# Patient Record
Sex: FEMALE | Race: WHITE | NOT HISPANIC OR LATINO | Employment: OTHER | URBAN - METROPOLITAN AREA
[De-identification: names, ages, dates, MRNs, and addresses within clinical notes are randomized per-mention and may not be internally consistent; named-entity substitution may affect disease eponyms.]

---

## 2017-02-16 ENCOUNTER — HISTORICAL (OUTPATIENT)
Dept: INTERNAL MEDICINE | Facility: CLINIC | Age: 62
End: 2017-02-16

## 2017-03-20 ENCOUNTER — HISTORICAL (OUTPATIENT)
Dept: ORTHOPEDICS | Facility: CLINIC | Age: 62
End: 2017-03-20

## 2017-03-28 ENCOUNTER — HISTORICAL (OUTPATIENT)
Dept: INTERNAL MEDICINE | Facility: CLINIC | Age: 62
End: 2017-03-28

## 2017-04-25 ENCOUNTER — HISTORICAL (OUTPATIENT)
Dept: INTERNAL MEDICINE | Facility: CLINIC | Age: 62
End: 2017-04-25

## 2017-05-04 ENCOUNTER — HISTORICAL (OUTPATIENT)
Dept: ENDOSCOPY | Facility: HOSPITAL | Age: 62
End: 2017-05-04

## 2017-05-04 LAB — CRC RECOMMENDATION EXT: NORMAL

## 2017-08-08 ENCOUNTER — HISTORICAL (OUTPATIENT)
Dept: INTERNAL MEDICINE | Facility: CLINIC | Age: 62
End: 2017-08-08

## 2017-08-08 LAB
T4 FREE SERPL-MCNC: 1.45 NG/DL (ref 0.76–1.46)
TSH SERPL-ACNC: 0.21 MIU/L (ref 0.36–3.74)

## 2017-09-05 ENCOUNTER — HISTORICAL (OUTPATIENT)
Dept: INTERNAL MEDICINE | Facility: CLINIC | Age: 62
End: 2017-09-05

## 2017-09-05 LAB
EST. AVERAGE GLUCOSE BLD GHB EST-MCNC: 148 MG/DL
HBA1C MFR BLD: 6.8 % (ref 4.2–6.3)

## 2017-10-19 ENCOUNTER — HISTORICAL (OUTPATIENT)
Dept: RADIOLOGY | Facility: HOSPITAL | Age: 62
End: 2017-10-19

## 2018-03-02 ENCOUNTER — HISTORICAL (OUTPATIENT)
Dept: INTERNAL MEDICINE | Facility: CLINIC | Age: 63
End: 2018-03-02

## 2018-03-02 LAB
EST. AVERAGE GLUCOSE BLD GHB EST-MCNC: 146 MG/DL
HBA1C MFR BLD: 6.7 % (ref 4.2–6.3)

## 2018-04-03 ENCOUNTER — HISTORICAL (OUTPATIENT)
Dept: INTERNAL MEDICINE | Facility: CLINIC | Age: 63
End: 2018-04-03

## 2018-04-03 LAB
ABS NEUT (OLG): 3.36 X10(3)/MCL (ref 2.1–9.2)
ALBUMIN SERPL-MCNC: 3.6 GM/DL (ref 3.4–5)
ALBUMIN/GLOB SERPL: 1 RATIO (ref 1–2)
ALP SERPL-CCNC: 79 UNIT/L (ref 45–117)
ALT SERPL-CCNC: 28 UNIT/L (ref 12–78)
APPEARANCE, UA: CLEAR
AST SERPL-CCNC: 18 UNIT/L (ref 15–37)
BACTERIA #/AREA URNS AUTO: ABNORMAL /[HPF]
BASOPHILS # BLD AUTO: 0.02 X10(3)/MCL
BASOPHILS NFR BLD AUTO: 0 %
BILIRUB SERPL-MCNC: 0.5 MG/DL (ref 0.2–1)
BILIRUB UR QL STRIP: NEGATIVE
BILIRUBIN DIRECT+TOT PNL SERPL-MCNC: 0.1 MG/DL
BILIRUBIN DIRECT+TOT PNL SERPL-MCNC: 0.4 MG/DL
BUN SERPL-MCNC: 19 MG/DL (ref 7–18)
CALCIUM SERPL-MCNC: 8.7 MG/DL (ref 8.5–10.1)
CHLORIDE SERPL-SCNC: 109 MMOL/L (ref 98–107)
CHOLEST SERPL-MCNC: 165 MG/DL
CHOLEST/HDLC SERPL: 4.1 {RATIO} (ref 0–4.4)
CO2 SERPL-SCNC: 26 MMOL/L (ref 21–32)
COLOR UR: ABNORMAL
CREAT SERPL-MCNC: 0.6 MG/DL (ref 0.6–1.3)
CREAT UR-MCNC: 169 MG/DL
EOSINOPHIL # BLD AUTO: 0.19 10*3/UL
EOSINOPHIL NFR BLD AUTO: 3 %
ERYTHROCYTE [DISTWIDTH] IN BLOOD BY AUTOMATED COUNT: 12.8 % (ref 11.5–14.5)
EST. AVERAGE GLUCOSE BLD GHB EST-MCNC: 131 MG/DL
GLOBULIN SER-MCNC: 3.5 GM/ML (ref 2.3–3.5)
GLUCOSE (UA): NORMAL
GLUCOSE SERPL-MCNC: 123 MG/DL (ref 74–106)
HAV IGM SERPL QL IA: NONREACTIVE
HBA1C MFR BLD: 6.2 % (ref 4.2–6.3)
HBV CORE IGM SERPL QL IA: NONREACTIVE
HBV SURFACE AG SERPL QL IA: NEGATIVE
HCT VFR BLD AUTO: 41.7 % (ref 35–46)
HCV AB SERPL QL IA: NONREACTIVE
HDLC SERPL-MCNC: 40 MG/DL
HGB BLD-MCNC: 13.4 GM/DL (ref 12–16)
HGB UR QL STRIP: NEGATIVE
HIV 1+2 AB+HIV1 P24 AG SERPL QL IA: NONREACTIVE
HYALINE CASTS #/AREA URNS LPF: ABNORMAL /[LPF]
IMM GRANULOCYTES # BLD AUTO: 0.02 10*3/UL
IMM GRANULOCYTES NFR BLD AUTO: 0 %
KETONES UR QL STRIP: ABNORMAL
LDLC SERPL CALC-MCNC: 88 MG/DL (ref 0–130)
LEUKOCYTE ESTERASE UR QL STRIP: 500 LEU/UL
LYMPHOCYTES # BLD AUTO: 1.85 X10(3)/MCL
LYMPHOCYTES NFR BLD AUTO: 30 % (ref 13–40)
MCH RBC QN AUTO: 28.5 PG (ref 26–34)
MCHC RBC AUTO-ENTMCNC: 32.1 GM/DL (ref 31–37)
MCV RBC AUTO: 88.7 FL (ref 80–100)
MICROALBUMIN UR-MCNC: 26.7 MG/L (ref 0–19)
MICROALBUMIN/CREAT RATIO PNL UR: 15.8 MCG/MG CR (ref 0–29)
MONOCYTES # BLD AUTO: 0.63 X10(3)/MCL
MONOCYTES NFR BLD AUTO: 10 % (ref 4–12)
MUCOUS THREADS URNS QL MICRO: ABNORMAL
NEUTROPHILS # BLD AUTO: 3.36 X10(3)/MCL
NEUTROPHILS NFR BLD AUTO: 55 X10(3)/MCL
NITRITE UR QL STRIP: NEGATIVE
PH UR STRIP: 5.5 [PH] (ref 4.5–8)
PLATELET # BLD AUTO: 201 X10(3)/MCL (ref 130–400)
PMV BLD AUTO: 10.1 FL (ref 7.4–10.4)
POTASSIUM SERPL-SCNC: 4.4 MMOL/L (ref 3.5–5.1)
PROT SERPL-MCNC: 7.1 GM/DL (ref 6.4–8.2)
PROT UR QL STRIP: 20 MG/DL
RBC # BLD AUTO: 4.7 X10(6)/MCL (ref 4–5.2)
RBC #/AREA URNS AUTO: ABNORMAL /[HPF]
SODIUM SERPL-SCNC: 141 MMOL/L (ref 136–145)
SP GR UR STRIP: 1.02 (ref 1–1.03)
SQUAMOUS #/AREA URNS LPF: ABNORMAL /[LPF]
T4 FREE SERPL-MCNC: 1.23 NG/DL (ref 0.76–1.46)
TRIGL SERPL-MCNC: 186 MG/DL
TSH SERPL-ACNC: 0.14 MIU/L (ref 0.36–3.74)
UROBILINOGEN UR STRIP-ACNC: 2 MG/DL
VLDLC SERPL CALC-MCNC: 37 MG/DL
WBC # SPEC AUTO: 6.1 X10(3)/MCL (ref 4.5–11)
WBC #/AREA URNS AUTO: ABNORMAL /HPF

## 2018-05-07 ENCOUNTER — HISTORICAL (OUTPATIENT)
Dept: INTERNAL MEDICINE | Facility: CLINIC | Age: 63
End: 2018-05-07

## 2018-05-07 LAB
APPEARANCE, UA: CLEAR
BACTERIA #/AREA URNS AUTO: ABNORMAL /[HPF]
BILIRUB UR QL STRIP: NEGATIVE
COLOR UR: YELLOW
GLUCOSE (UA): NORMAL
HGB UR QL STRIP: NEGATIVE
HYALINE CASTS #/AREA URNS LPF: ABNORMAL /[LPF]
KETONES UR QL STRIP: NEGATIVE
LEUKOCYTE ESTERASE UR QL STRIP: 500 LEU/UL
MUCOUS THREADS URNS QL MICRO: SLIGHT
NITRITE UR QL STRIP: NEGATIVE
PH UR STRIP: 6 [PH] (ref 4.5–8)
PROT UR QL STRIP: NEGATIVE
RBC #/AREA URNS AUTO: ABNORMAL /[HPF]
SP GR UR STRIP: 1.02 (ref 1–1.03)
SQUAMOUS #/AREA URNS LPF: ABNORMAL /[LPF]
UROBILINOGEN UR STRIP-ACNC: NORMAL
WBC #/AREA URNS AUTO: ABNORMAL /HPF

## 2018-05-09 LAB — FINAL CULTURE: NORMAL

## 2018-05-25 ENCOUNTER — HISTORICAL (OUTPATIENT)
Dept: INTERNAL MEDICINE | Facility: CLINIC | Age: 63
End: 2018-05-25

## 2018-07-30 ENCOUNTER — HISTORICAL (OUTPATIENT)
Dept: INTERNAL MEDICINE | Facility: CLINIC | Age: 63
End: 2018-07-30

## 2018-07-30 LAB
APPEARANCE, UA: CLEAR
BACTERIA #/AREA URNS AUTO: ABNORMAL /[HPF]
BILIRUB UR QL STRIP: NEGATIVE
CHOLEST SERPL-MCNC: 191 MG/DL
CHOLEST/HDLC SERPL: 4.1 {RATIO} (ref 0–4.4)
COLOR UR: ABNORMAL
GLUCOSE (UA): NORMAL
HDLC SERPL-MCNC: 47 MG/DL
HGB UR QL STRIP: NEGATIVE
HYALINE CASTS #/AREA URNS LPF: ABNORMAL /[LPF]
KETONES UR QL STRIP: NEGATIVE
LDLC SERPL CALC-MCNC: 121 MG/DL (ref 0–130)
LEUKOCYTE ESTERASE UR QL STRIP: 500 LEU/UL
NITRITE UR QL STRIP: NEGATIVE
PH UR STRIP: 6 [PH] (ref 4.5–8)
PROT UR QL STRIP: NEGATIVE
RBC #/AREA URNS AUTO: ABNORMAL /[HPF]
SP GR UR STRIP: 1.02 (ref 1–1.03)
SQUAMOUS #/AREA URNS LPF: ABNORMAL /[LPF]
T4 FREE SERPL-MCNC: 1.35 NG/DL (ref 0.76–1.46)
TRIGL SERPL-MCNC: 113 MG/DL
TSH SERPL-ACNC: 0.33 MIU/L (ref 0.36–3.74)
UROBILINOGEN UR STRIP-ACNC: NORMAL
VLDLC SERPL CALC-MCNC: 23 MG/DL
WBC #/AREA URNS AUTO: ABNORMAL /HPF

## 2018-08-01 LAB — FINAL CULTURE: NORMAL

## 2018-10-22 ENCOUNTER — HISTORICAL (OUTPATIENT)
Dept: ADMINISTRATIVE | Facility: HOSPITAL | Age: 63
End: 2018-10-22

## 2018-10-22 ENCOUNTER — HISTORICAL (OUTPATIENT)
Dept: RADIOLOGY | Facility: HOSPITAL | Age: 63
End: 2018-10-22

## 2018-10-22 LAB
APPEARANCE, UA: CLEAR
BACTERIA #/AREA URNS AUTO: ABNORMAL /[HPF]
BILIRUB UR QL STRIP: NEGATIVE
COLOR UR: YELLOW
EST. AVERAGE GLUCOSE BLD GHB EST-MCNC: 146 MG/DL
GLUCOSE (UA): NORMAL
HBA1C MFR BLD: 6.7 % (ref 4.2–6.3)
HGB UR QL STRIP: NEGATIVE
HYALINE CASTS #/AREA URNS LPF: ABNORMAL /[LPF]
KETONES UR QL STRIP: NEGATIVE
LEUKOCYTE ESTERASE UR QL STRIP: 250 LEU/UL
NITRITE UR QL STRIP: NEGATIVE
PH UR STRIP: 6 [PH] (ref 4.5–8)
PROT UR QL STRIP: NEGATIVE
RBC #/AREA URNS AUTO: ABNORMAL /[HPF]
SP GR UR STRIP: 1.02 (ref 1–1.03)
SQUAMOUS #/AREA URNS LPF: ABNORMAL /[LPF]
UROBILINOGEN UR STRIP-ACNC: NORMAL
WBC #/AREA URNS AUTO: ABNORMAL /HPF

## 2018-10-24 LAB — FINAL CULTURE: NORMAL

## 2019-04-16 ENCOUNTER — HISTORICAL (OUTPATIENT)
Dept: INTERNAL MEDICINE | Facility: CLINIC | Age: 64
End: 2019-04-16

## 2019-04-16 LAB
ABS NEUT (OLG): 4.58 X10(3)/MCL (ref 2.1–9.2)
ALBUMIN SERPL-MCNC: 3.8 GM/DL (ref 3.4–5)
ALBUMIN/GLOB SERPL: 1.1 RATIO (ref 1.1–2)
ALP SERPL-CCNC: 80 UNIT/L (ref 45–117)
ALT SERPL-CCNC: 34 UNIT/L (ref 12–78)
AST SERPL-CCNC: 23 UNIT/L (ref 15–37)
BASOPHILS # BLD AUTO: 0.02 X10(3)/MCL
BASOPHILS NFR BLD AUTO: 0 %
BILIRUB SERPL-MCNC: 0.7 MG/DL (ref 0.2–1)
BILIRUBIN DIRECT+TOT PNL SERPL-MCNC: 0.2 MG/DL
BILIRUBIN DIRECT+TOT PNL SERPL-MCNC: 0.5 MG/DL
BUN SERPL-MCNC: 16 MG/DL (ref 7–18)
CALCIUM SERPL-MCNC: 9 MG/DL (ref 8.5–10.1)
CHLORIDE SERPL-SCNC: 107 MMOL/L (ref 98–107)
CO2 SERPL-SCNC: 30 MMOL/L (ref 21–32)
CREAT SERPL-MCNC: 0.6 MG/DL (ref 0.6–1.3)
CREAT UR-MCNC: 64 MG/DL
EOSINOPHIL # BLD AUTO: 0.31 X10(3)/MCL
EOSINOPHIL NFR BLD AUTO: 4 %
ERYTHROCYTE [DISTWIDTH] IN BLOOD BY AUTOMATED COUNT: 12.9 % (ref 11.5–14.5)
EST. AVERAGE GLUCOSE BLD GHB EST-MCNC: 160 MG/DL
GLOBULIN SER-MCNC: 3.4 GM/ML (ref 2.3–3.5)
GLUCOSE SERPL-MCNC: 145 MG/DL (ref 74–106)
HBA1C MFR BLD: 7.2 % (ref 4.2–6.3)
HCT VFR BLD AUTO: 44.3 % (ref 35–46)
HGB BLD-MCNC: 13.9 GM/DL (ref 12–16)
IMM GRANULOCYTES # BLD AUTO: 0.03 10*3/UL
IMM GRANULOCYTES NFR BLD AUTO: 0 %
LYMPHOCYTES # BLD AUTO: 1.77 X10(3)/MCL
LYMPHOCYTES NFR BLD AUTO: 24 % (ref 13–40)
MCH RBC QN AUTO: 28 PG (ref 26–34)
MCHC RBC AUTO-ENTMCNC: 31.4 GM/DL (ref 31–37)
MCV RBC AUTO: 89.1 FL (ref 80–100)
MICROALBUMIN UR-MCNC: <5 MG/L (ref 0–19)
MICROALBUMIN/CREAT RATIO PNL UR: NORMAL MCG/MG CR (ref 0–29)
MONOCYTES # BLD AUTO: 0.78 X10(3)/MCL
MONOCYTES NFR BLD AUTO: 10 % (ref 4–12)
NEUTROPHILS # BLD AUTO: 4.58 X10(3)/MCL
NEUTROPHILS NFR BLD AUTO: 61 X10(3)/MCL
PLATELET # BLD AUTO: 217 X10(3)/MCL (ref 130–400)
PMV BLD AUTO: 10 FL (ref 7.4–10.4)
POTASSIUM SERPL-SCNC: 4.7 MMOL/L (ref 3.5–5.1)
PROT SERPL-MCNC: 7.2 GM/DL (ref 6.4–8.2)
RBC # BLD AUTO: 4.97 X10(6)/MCL (ref 4–5.2)
SODIUM SERPL-SCNC: 140 MMOL/L (ref 136–145)
T4 FREE SERPL-MCNC: 1.26 NG/DL (ref 0.76–1.46)
TSH SERPL-ACNC: 0.15 MIU/L (ref 0.36–3.74)
WBC # SPEC AUTO: 7.5 X10(3)/MCL (ref 4.5–11)

## 2019-07-01 ENCOUNTER — HISTORICAL (OUTPATIENT)
Dept: RADIOLOGY | Facility: HOSPITAL | Age: 64
End: 2019-07-01

## 2019-07-01 LAB
ALBUMIN SERPL-MCNC: 3.9 GM/DL (ref 3.4–5)
ALBUMIN/GLOB SERPL: 1.1 RATIO (ref 1.1–2)
ALP SERPL-CCNC: 92 UNIT/L (ref 45–117)
ALT SERPL-CCNC: 43 UNIT/L (ref 12–78)
AMYLASE SERPL-CCNC: 45 UNIT/L (ref 25–115)
AST SERPL-CCNC: 18 UNIT/L (ref 15–37)
BILIRUB SERPL-MCNC: 0.8 MG/DL (ref 0.2–1)
BILIRUBIN DIRECT+TOT PNL SERPL-MCNC: 0.2 MG/DL
BILIRUBIN DIRECT+TOT PNL SERPL-MCNC: 0.6 MG/DL
BUN SERPL-MCNC: 15 MG/DL (ref 7–18)
CALCIUM SERPL-MCNC: 9.2 MG/DL (ref 8.5–10.1)
CHLORIDE SERPL-SCNC: 109 MMOL/L (ref 98–107)
CO2 SERPL-SCNC: 31 MMOL/L (ref 21–32)
CREAT SERPL-MCNC: 0.6 MG/DL (ref 0.6–1.3)
GLOBULIN SER-MCNC: 3.4 GM/ML (ref 2.3–3.5)
GLUCOSE SERPL-MCNC: 160 MG/DL (ref 74–106)
LIPASE SERPL-CCNC: 114 UNIT/L (ref 73–393)
POTASSIUM SERPL-SCNC: 4.8 MMOL/L (ref 3.5–5.1)
PROT SERPL-MCNC: 7.3 GM/DL (ref 6.4–8.2)
SODIUM SERPL-SCNC: 141 MMOL/L (ref 136–145)

## 2019-07-16 LAB
LEFT EYE DM RETINOPATHY: NEGATIVE
RIGHT EYE DM RETINOPATHY: NEGATIVE

## 2019-09-05 ENCOUNTER — HISTORICAL (OUTPATIENT)
Dept: LAB | Facility: HOSPITAL | Age: 64
End: 2019-09-05

## 2019-09-25 LAB — C DIFF INTERP: ABNORMAL

## 2019-09-26 ENCOUNTER — HISTORICAL (OUTPATIENT)
Dept: INTERNAL MEDICINE | Facility: CLINIC | Age: 64
End: 2019-09-26

## 2019-10-24 ENCOUNTER — HISTORICAL (OUTPATIENT)
Dept: RADIOLOGY | Facility: HOSPITAL | Age: 64
End: 2019-10-24

## 2019-11-25 ENCOUNTER — HISTORICAL (OUTPATIENT)
Dept: LAB | Facility: HOSPITAL | Age: 64
End: 2019-11-25

## 2019-11-25 LAB — C DIFF INTERP: ABNORMAL

## 2019-11-27 LAB — FINAL CULTURE: NORMAL

## 2019-12-27 ENCOUNTER — HISTORICAL (OUTPATIENT)
Dept: LAB | Facility: HOSPITAL | Age: 64
End: 2019-12-27

## 2019-12-27 LAB — C DIFF INTERP: NEGATIVE

## 2020-01-04 ENCOUNTER — HISTORICAL (OUTPATIENT)
Dept: LAB | Facility: HOSPITAL | Age: 65
End: 2020-01-04

## 2020-01-04 LAB
T3FREE SERPL-MCNC: 2 PG/ML (ref 2.2–4)
T4 FREE SERPL-MCNC: 1.3 NG/DL (ref 0.76–1.46)
TSH SERPL-ACNC: 0.4 MIU/ML (ref 0.36–3.74)

## 2020-01-15 ENCOUNTER — HISTORICAL (OUTPATIENT)
Dept: INFUSION THERAPY | Facility: HOSPITAL | Age: 65
End: 2020-01-15

## 2020-01-20 ENCOUNTER — HISTORICAL (OUTPATIENT)
Dept: RADIOLOGY | Facility: HOSPITAL | Age: 65
End: 2020-01-20

## 2020-04-27 ENCOUNTER — HISTORICAL (OUTPATIENT)
Dept: LAB | Facility: HOSPITAL | Age: 65
End: 2020-04-27

## 2020-04-27 LAB
ALBUMIN SERPL-MCNC: 4 GM/DL (ref 3.4–5)
ALBUMIN/GLOB SERPL: 1.4 RATIO (ref 1.1–2)
ALP SERPL-CCNC: 89 UNIT/L (ref 46–116)
ALT SERPL-CCNC: 39 UNIT/L (ref 12–78)
AST SERPL-CCNC: 23 UNIT/L (ref 15–37)
BILIRUB SERPL-MCNC: 0.7 MG/DL (ref 0.2–1)
BILIRUBIN DIRECT+TOT PNL SERPL-MCNC: 0.18 MG/DL (ref 0–0.2)
BILIRUBIN DIRECT+TOT PNL SERPL-MCNC: 0.52 MG/DL (ref 0–0.8)
BUN SERPL-MCNC: 17 MG/DL (ref 7–18)
CALCIUM SERPL-MCNC: 9.3 MG/DL (ref 8.5–10.1)
CHLORIDE SERPL-SCNC: 105 MMOL/L (ref 98–107)
CHOLEST SERPL-MCNC: 208 MG/DL (ref 0–200)
CHOLEST/HDLC SERPL: 4.1 {RATIO} (ref 0–4)
CO2 SERPL-SCNC: 31.3 MMOL/L (ref 21–32)
CREAT SERPL-MCNC: 0.73 MG/DL (ref 0.6–1.3)
DEPRECATED CALCIDIOL+CALCIFEROL SERPL-MC: 37.2 NG/ML (ref 6.6–49.9)
EST. AVERAGE GLUCOSE BLD GHB EST-MCNC: 143 MG/DL
GLOBULIN SER-MCNC: 2.8 GM/DL (ref 2.4–3.5)
GLUCOSE SERPL-MCNC: 139 MG/DL (ref 74–106)
HBA1C MFR BLD: 6.6 % (ref 4.5–6.2)
HDLC SERPL-MCNC: 51 MG/DL (ref 40–60)
LDLC SERPL CALC-MCNC: 134 MG/DL (ref 0–129)
POTASSIUM SERPL-SCNC: 4.4 MMOL/L (ref 3.5–5.1)
PROT SERPL-MCNC: 6.8 GM/DL (ref 6.4–8.2)
SODIUM SERPL-SCNC: 144 MMOL/L (ref 136–145)
TRIGL SERPL-MCNC: 117 MG/DL
TSH SERPL-ACNC: 3.23 MIU/ML (ref 0.36–3.74)
VLDLC SERPL CALC-MCNC: 23 MG/DL

## 2020-07-08 ENCOUNTER — HISTORICAL (OUTPATIENT)
Dept: LAB | Facility: HOSPITAL | Age: 65
End: 2020-07-08

## 2020-07-08 LAB
ABS NEUT (OLG): 3.49 X10(3)/MCL (ref 2.1–9.2)
ALBUMIN SERPL-MCNC: 4.3 GM/DL (ref 3.4–5)
ALBUMIN/GLOB SERPL: 1.4 RATIO (ref 1.1–2)
ALP SERPL-CCNC: 86 UNIT/L (ref 46–116)
ALT SERPL-CCNC: 39 UNIT/L (ref 12–78)
AST SERPL-CCNC: 25 UNIT/L (ref 15–37)
BASOPHILS # BLD AUTO: 0 X10(3)/MCL (ref 0–0.2)
BASOPHILS NFR BLD AUTO: 0 %
BILIRUB SERPL-MCNC: 1 MG/DL (ref 0.2–1)
BILIRUBIN DIRECT+TOT PNL SERPL-MCNC: 0.22 MG/DL (ref 0–0.2)
BILIRUBIN DIRECT+TOT PNL SERPL-MCNC: 0.78 MG/DL (ref 0–0.8)
BUN SERPL-MCNC: 17.9 MG/DL (ref 7–18)
CALCIUM SERPL-MCNC: 9.8 MG/DL (ref 8.5–10.1)
CHLORIDE SERPL-SCNC: 103 MMOL/L (ref 98–107)
CHOLEST SERPL-MCNC: 197 MG/DL (ref 0–200)
CHOLEST/HDLC SERPL: 4.6 {RATIO} (ref 0–4)
CO2 SERPL-SCNC: 30.7 MMOL/L (ref 21–32)
CREAT SERPL-MCNC: 0.54 MG/DL (ref 0.6–1.3)
DEPRECATED CALCIDIOL+CALCIFEROL SERPL-MC: 38 NG/ML (ref 6.6–49.9)
EOSINOPHIL # BLD AUTO: 0.1 X10(3)/MCL (ref 0–0.9)
EOSINOPHIL NFR BLD AUTO: 2 %
ERYTHROCYTE [DISTWIDTH] IN BLOOD BY AUTOMATED COUNT: 12.5 % (ref 11.5–17)
EST. AVERAGE GLUCOSE BLD GHB EST-MCNC: 140 MG/DL
GLOBULIN SER-MCNC: 3 GM/DL (ref 2.4–3.5)
GLUCOSE SERPL-MCNC: 126 MG/DL (ref 74–106)
HBA1C MFR BLD: 6.5 % (ref 4.5–6.2)
HCT VFR BLD AUTO: 43.1 % (ref 37–47)
HDLC SERPL-MCNC: 43 MG/DL (ref 40–60)
HGB BLD-MCNC: 13.8 GM/DL (ref 12–16)
IMM GRANULOCYTES # BLD AUTO: 0.01 % (ref 0–0.02)
IMM GRANULOCYTES NFR BLD AUTO: 0.2 % (ref 0–0.43)
LDLC SERPL CALC-MCNC: 126 MG/DL (ref 0–129)
LYMPHOCYTES # BLD AUTO: 1.4 X10(3)/MCL (ref 0.6–4.6)
LYMPHOCYTES NFR BLD AUTO: 25 %
MCH RBC QN AUTO: 28.6 PG (ref 27–31)
MCHC RBC AUTO-ENTMCNC: 32 GM/DL (ref 33–36)
MCV RBC AUTO: 89.2 FL (ref 80–94)
MONOCYTES # BLD AUTO: 0.6 X10(3)/MCL (ref 0.1–1.3)
MONOCYTES NFR BLD AUTO: 11 %
NEUTROPHILS # BLD AUTO: 3.49 X10(3)/MCL (ref 1.4–7.9)
NEUTROPHILS NFR BLD AUTO: 61 %
PLATELET # BLD AUTO: 213 X10(3)/MCL (ref 130–400)
PMV BLD AUTO: 9.9 FL (ref 9.4–12.4)
POTASSIUM SERPL-SCNC: 5.4 MMOL/L (ref 3.5–5.1)
PROT SERPL-MCNC: 7.3 GM/DL (ref 6.4–8.2)
RBC # BLD AUTO: 4.83 X10(6)/MCL (ref 4.2–5.4)
SODIUM SERPL-SCNC: 142 MMOL/L (ref 136–145)
TRIGL SERPL-MCNC: 139 MG/DL
TSH SERPL-ACNC: 0.37 MIU/ML (ref 0.36–3.74)
VIT B12 SERPL-MCNC: 513 PG/ML (ref 193–986)
VLDLC SERPL CALC-MCNC: 28 MG/DL
WBC # SPEC AUTO: 5.7 X10(3)/MCL (ref 4.5–11.5)

## 2020-10-26 ENCOUNTER — HISTORICAL (OUTPATIENT)
Dept: RADIOLOGY | Facility: HOSPITAL | Age: 65
End: 2020-10-26

## 2020-10-26 LAB
EST. AVERAGE GLUCOSE BLD GHB EST-MCNC: 134 MG/DL
HBA1C MFR BLD: 6.3 % (ref 4.5–6.2)

## 2020-11-06 ENCOUNTER — HISTORICAL (OUTPATIENT)
Dept: ADMINISTRATIVE | Facility: HOSPITAL | Age: 65
End: 2020-11-06

## 2020-11-06 LAB
BILIRUB SERPL-MCNC: NEGATIVE MG/DL
BLOOD URINE, POC: NEGATIVE
CLARITY, POC UA: CLEAR
COLOR, POC UA: YELLOW
GLUCOSE UR QL STRIP: NORMAL
KETONES UR QL STRIP: NEGATIVE
LEUKOCYTE EST, POC UA: NEGATIVE
NITRITE, POC UA: NEGATIVE
PH, POC UA: 5.5
PROTEIN, POC: NEGATIVE
SPECIFIC GRAVITY, POC UA: 1.02
UROBILINOGEN, POC UA: NORMAL

## 2021-01-11 ENCOUNTER — HISTORICAL (OUTPATIENT)
Dept: LAB | Facility: HOSPITAL | Age: 66
End: 2021-01-11

## 2021-01-11 LAB
CK MB SERPL-MCNC: 2.7 NG/ML
CK SERPL-CCNC: 91 U/L (ref 29–168)
EST. AVERAGE GLUCOSE BLD GHB EST-MCNC: 145.6 MG/DL
HBA1C MFR BLD: 6.7 %
TROPONIN I SERPL-MCNC: <0.02 NG/ML (ref 0.02–0.06)
TSH SERPL-ACNC: 0.72 UIU/ML (ref 0.35–4.94)

## 2021-01-13 ENCOUNTER — HISTORICAL (OUTPATIENT)
Dept: RADIOLOGY | Facility: HOSPITAL | Age: 66
End: 2021-01-13

## 2021-06-14 ENCOUNTER — HISTORICAL (OUTPATIENT)
Dept: LAB | Facility: HOSPITAL | Age: 66
End: 2021-06-14

## 2021-06-14 LAB
ABS NEUT (OLG): 5.89 X10(3)/MCL (ref 2.1–9.2)
ALBUMIN SERPL-MCNC: 4.2 GM/DL (ref 3.4–4.8)
ALBUMIN/GLOB SERPL: 1.6 RATIO (ref 1.1–2)
ALP SERPL-CCNC: 110 UNIT/L (ref 40–150)
ALT SERPL-CCNC: 47 UNIT/L (ref 0–55)
AST SERPL-CCNC: 35 UNIT/L (ref 5–34)
BASOPHILS # BLD AUTO: 0 X10(3)/MCL (ref 0–0.2)
BASOPHILS NFR BLD AUTO: 0 %
BILIRUB SERPL-MCNC: 1 MG/DL
BILIRUBIN DIRECT+TOT PNL SERPL-MCNC: 0.4 MG/DL (ref 0–0.5)
BILIRUBIN DIRECT+TOT PNL SERPL-MCNC: 0.6 MG/DL (ref 0–0.8)
BUN SERPL-MCNC: 15.5 MG/DL (ref 9.8–20.1)
CALCIUM SERPL-MCNC: 8.9 MG/DL (ref 8.4–10.2)
CHLORIDE SERPL-SCNC: 103 MMOL/L (ref 98–107)
CHOLEST SERPL-MCNC: 187 MG/DL
CHOLEST/HDLC SERPL: 5 {RATIO} (ref 0–5)
CO2 SERPL-SCNC: 29 MMOL/L (ref 23–31)
CREAT SERPL-MCNC: 0.69 MG/DL (ref 0.55–1.02)
DEPRECATED CALCIDIOL+CALCIFEROL SERPL-MC: 45.2 NG/ML (ref 30–80)
EOSINOPHIL # BLD AUTO: 0.1 X10(3)/MCL (ref 0–0.9)
EOSINOPHIL NFR BLD AUTO: 1 %
ERYTHROCYTE [DISTWIDTH] IN BLOOD BY AUTOMATED COUNT: 12.5 % (ref 11.5–17)
EST. AVERAGE GLUCOSE BLD GHB EST-MCNC: 134.1 MG/DL
GLOBULIN SER-MCNC: 2.7 GM/DL (ref 2.4–3.5)
GLUCOSE SERPL-MCNC: 130 MG/DL (ref 82–115)
HBA1C MFR BLD: 6.3 %
HCT VFR BLD AUTO: 49.2 % (ref 37–47)
HDLC SERPL-MCNC: 39 MG/DL (ref 35–60)
HGB BLD-MCNC: 15.1 GM/DL (ref 12–16)
IMM GRANULOCYTES # BLD AUTO: 0.01 % (ref 0–0.02)
IMM GRANULOCYTES NFR BLD AUTO: 0.1 % (ref 0–0.43)
LDLC SERPL CALC-MCNC: 119 MG/DL (ref 50–140)
LYMPHOCYTES # BLD AUTO: 1.4 X10(3)/MCL (ref 0.6–4.6)
LYMPHOCYTES NFR BLD AUTO: 17 %
MCH RBC QN AUTO: 28.4 PG (ref 27–31)
MCHC RBC AUTO-ENTMCNC: 30.7 GM/DL (ref 33–36)
MCV RBC AUTO: 92.5 FL (ref 80–94)
MONOCYTES # BLD AUTO: 0.8 X10(3)/MCL (ref 0.1–1.3)
MONOCYTES NFR BLD AUTO: 9 %
NEUTROPHILS # BLD AUTO: 5.89 X10(3)/MCL (ref 1.4–7.9)
NEUTROPHILS NFR BLD AUTO: 73 %
PLATELET # BLD AUTO: 212 X10(3)/MCL (ref 130–400)
PMV BLD AUTO: 9.5 FL (ref 9.4–12.4)
POTASSIUM SERPL-SCNC: 4.6 MMOL/L (ref 3.5–5.1)
PROT SERPL-MCNC: 6.9 GM/DL (ref 5.8–7.6)
RBC # BLD AUTO: 5.32 X10(6)/MCL (ref 4.2–5.4)
SODIUM SERPL-SCNC: 141 MMOL/L (ref 136–145)
TRIGL SERPL-MCNC: 144 MG/DL (ref 37–140)
TSH SERPL-ACNC: 2.24 UIU/ML (ref 0.35–4.94)
VLDLC SERPL CALC-MCNC: 29 MG/DL
WBC # SPEC AUTO: 8.1 X10(3)/MCL (ref 4.5–11.5)

## 2021-10-07 ENCOUNTER — HISTORICAL (OUTPATIENT)
Dept: ADMINISTRATIVE | Facility: HOSPITAL | Age: 66
End: 2021-10-07

## 2021-10-07 LAB — SARS-COV-2 RNA RESP QL NAA+PROBE: DETECTED

## 2021-10-28 ENCOUNTER — HISTORICAL (OUTPATIENT)
Dept: RADIOLOGY | Facility: HOSPITAL | Age: 66
End: 2021-10-28

## 2022-03-08 ENCOUNTER — HISTORICAL (OUTPATIENT)
Dept: LAB | Facility: HOSPITAL | Age: 67
End: 2022-03-08

## 2022-03-08 LAB
EST. AVERAGE GLUCOSE BLD GHB EST-MCNC: 142.7 MG/DL
HBA1C MFR BLD: 6.6 %

## 2022-04-10 ENCOUNTER — HISTORICAL (OUTPATIENT)
Dept: ADMINISTRATIVE | Facility: HOSPITAL | Age: 67
End: 2022-04-10
Payer: MEDICARE

## 2022-04-29 VITALS
HEIGHT: 66 IN | DIASTOLIC BLOOD PRESSURE: 64 MMHG | OXYGEN SATURATION: 100 % | DIASTOLIC BLOOD PRESSURE: 70 MMHG | SYSTOLIC BLOOD PRESSURE: 111 MMHG | SYSTOLIC BLOOD PRESSURE: 115 MMHG | WEIGHT: 181.88 LBS | WEIGHT: 194 LBS | BODY MASS INDEX: 31.18 KG/M2 | HEIGHT: 66 IN | BODY MASS INDEX: 29.23 KG/M2

## 2022-04-30 NOTE — OP NOTE
DATE OF SURGERY:    05/04/2017    SURGEON:  Sandip Kevin M.D.    attending physician:  Mason Bettencourt MD    RESIDENT ENDOSCOPIST:  Sandip Kevin M.D.    PROCEDURE:  Colonoscopy, diagnostic.    PREOPERATIVE DIAGNOSES:  Positive fecal occult blood test.    POSTOPERATIVE DIAGNOSIS:  Positive fecal occult blood test.    FINDINGS:  Normal colonoscopy.    COMPLICATIONS:  None.    SPECIMENS:  None.    ESTIMATED BLOOD LOSS:  None.    PROCEDURE IN DETAIL:  Risks, benefits, and alternatives to diagnostic colonoscopy were discussed with the patient prior to the procedure and informed consent was obtained.  The patient then proceeded to the endoscopy suite, where deep sedation was achieved with propofol.  A digital rectal exam was performed and there were no notable abnormalities.  External examination of the anus revealed a normal-appearing anus without abnormalities.  The colonoscope was then introduced, the colon was insufflated, and the scope was advanced towards the cecum.  The terminal ileum was cannulated, confirming position.  Examination of the cecum revealed no significant pathology.  Examination of the ascending colon revealed no polyps and no significant pathology.  Examination of the hepatic flexure and transverse colon revealed no significant pathology.  Examination of the splenic flexure and descending colon revealed no significant pathology.  Examination of the sigmoid colon revealed no significant pathology.  There was no evidence of diverticular disease.  Examination of the rectum revealed no significant pathology.  The camera was retroflexed within the rectum.  Examination of the anus and anal canal revealed no notable pathology.  There was no evidence of polyps at any point during this study.  The rectum was then desufflated and the scope was withdrawn.  There was no evidence of abnormality within the anal canal.  This concluded the procedure.  Dr. Bettencourt was present and available for the duration of  the case.    PLAN:    1. Recommend high-fiber diet, increase water intake to 64 ounces per day.  2. Recommend followup with PCP at scheduled appointment time.  3. Recommend repeat colonoscopy in 10 years, or as needed for symptoms.        ______________________________  CHEMA Linares/COOPER  DD:  05/04/2017  Time:  11:04AM  DT:  05/04/2017  Time:  11:32AM  Job #:  [id number]    cc: Mason Bettencourt MD

## 2022-05-04 NOTE — HISTORICAL [facility] CERNER
This is a historical note converted from [employer]. Formatting and pictures may have been removed.  Please reference [employer] for original formatting and attached multimedia. Chief Complaint  wants flu shot  History of Present Illness  64 y/o female here for f/u. Pt has hx Anemia, DM2, HTN, Hypothyroidism, Jaw pain. neck pain, calcific tendonitis of shoulder. Pt followed in Ortho Cl for knee pain and shoulder pain. [1] Pt c/o pain anterior to external auditory canal.  Review of Systems  Constitutional: negative except as stated in HPI  Eye: negative except as stated in HPI  ENT: negative except as stated in HPI  Respiratory: negative except as stated in HPI  Cardiovascular: negative except as stated in HPI  Gastrointestinal: negative except as stated in HPI  Genitourinary: negative except as stated in HPI  Heme/Lymph: negative except as stated in HPI  Endocrine: negative except as stated in HPI  Immunologic: negative except as stated in HPI  Musculoskeletal: negative except as stated in HPI  Integumentary: negative except as stated in HPI  Neurologic: negative except as stated in HPI  ?   All Other ROS_ negative except as stated in HPI  ?  Physical Exam  Vitals & Measurements  T:?36.8? ?C (Oral)? HR:?61(Peripheral)? RR:?18? BP:?115/64?  HT:?167?cm? HT:?167?cm? WT:?88?kg? WT:?88?kg? BMI:?31.55?  General: Alert and oriented, No acute distress.  Eye: Pupils are equal, round and reactive to light, Extraocular movements are intact.  HENT: Normocephalic. Bilat TMs clear.  Neck: Supple, Non-tender, No carotid bruit, No lymphadenopathy.  Respiratory: Lungs are clear to auscultation, Respirations are non-labored, Breath sounds are equal, Symmetrical chest wall expansion.  Cardiovascular: Normal rate, Regular rhythm, No murmur.  Gastrointestinal: Soft, Non-tender, Non-distended, Normal bowel sounds.  Musculoskeletal: Normal range of motion.  Integumentary: Warm, Dry, Intact.  Neurologic: No focal deficits, Cranial Nerves II-XII  are grossly intact.  Assessment/Plan  Anemia  ?Resolved. Will monitor.  Ordered:  CBC w/ Auto Diff, Routine collect, *Est. 04/22/19 3:00:00 CDT, Blood, Order for future visit, *Est. Stop date 04/22/19 3:00:00 CDT, Lab Collect, Anemia, 10/22/18 7:45:00 CDT  Clinic Follow up, *Est. 04/22/19 3:00:00 CDT, in 6 months with OBI Glaser, Order for future visit, Encounter for immunization  Anemia  Blurred vision, [facility]  Office/Outpatient Visit Level 4 Established [id number] PC, Encounter for immunization  Anemia  Blurred vision  Diabetes mellitus type 2, controlled  Hematuria  HLD (hyperlipidemia)  HTN (hypertension)  Hypothyroidism  Overweight  PND (post-nasal drip)  Positive fecal occult blood test  Skin lesion...  ?  Back pain  ?XR L-spine today. Refilled pain med as prescribed.  Ordered:  Office/Outpatient Visit Level 4 Established [id number] PC, Encounter for immunization  Anemia  Blurred vision  Diabetes mellitus type 2, controlled  Hematuria  HLD (hyperlipidemia)  HTN (hypertension)  Hypothyroidism  Overweight  PND (post-nasal drip)  Positive fecal occult blood test  Skin lesion...  XR Spine Lumbar 2 or 3 Views, Routine, *Est. 10/22/18 3:00:00 CDT, Back Pain, None, Ambulatory, Rad Type, Order for future visit, Back pain, Not Scheduled, *Est. 10/22/18 3:00:00 CDT  ?  Blurred vision  ?Pt followed in Eye cl. Keep appts.  Ordered:  Clinic Follow up, *Est. 04/22/19 3:00:00 CDT, in 6 months with OBI Glaser, Order for future visit, Encounter for immunization  Anemia  Blurred vision, [facility]  Office/Outpatient Visit Level 4 Established [id number] PC, Encounter for immunization  Anemia  Blurred vision  Diabetes mellitus type 2, controlled  Hematuria  HLD (hyperlipidemia)  HTN (hypertension)  Hypothyroidism  Overweight  PND (post-nasal drip)  Positive fecal occult blood test  Skin lesion...  ?  Diabetes mellitus type 2, controlled  ???ADA diet and exercise. A1c 6.7 today.?Cont DM meds as  prescribed. Pt UTD with pneumovax. DM eye exam done 5-28-18. DM foot exam done 4-11-18. [2] Give flu and prevnar vaccine today.  Ordered:  Misc Prescription, Blood Glucose test strips Wave sense presto, See Instructions, Check CBG once daily and keep log., # 100 EA, 11 Refill(s), Pharmacy: [facility]  Hemoglobin A1C [facility], Routine collect, *Est. 04/22/19 3:00:00 CDT, Blood, Order for future visit, *Est. Stop date 04/22/19 3:00:00 CDT, Lab Collect, Diabetes mellitus type 2, controlled, 10/22/18 7:45:00 CDT  Microalbum/Creatinine Ratio Urine (Microalb/Creat), Routine collect, Urine, Order for future visit, *Est. 04/22/19 3:00:00 CDT, *Est. Stop date 04/22/19 3:00:00 CDT, Nurse collect, Diabetes mellitus type 2, controlled  Office/Outpatient Visit Level 4 Established [id number] PC, Encounter for immunization  Anemia  Blurred vision  Diabetes mellitus type 2, controlled  Hematuria  HLD (hyperlipidemia)  HTN (hypertension)  Hypothyroidism  Overweight  PND (post-nasal drip)  Positive fecal occult blood test  Skin lesion...  ?  Encounter for immunization  ?Give flu vaccine. Give prevnar vaccine today.  Ordered:  Clinic Follow up, *Est. 04/22/19 3:00:00 CDT, in 6 months with OBI Glaser, Order for future visit, Encounter for immunization  Anemia  Blurred vision, [facility]  Office/Outpatient Visit Level 4 Established [id number] PC, Encounter for immunization  Anemia  Blurred vision  Diabetes mellitus type 2, controlled  Hematuria  HLD (hyperlipidemia)  HTN (hypertension)  Hypothyroidism  Overweight  PND (post-nasal drip)  Positive fecal occult blood test  Skin lesion...  ?  Hematuria  ?Resolved. Will monitor.  Ordered:  Office/Outpatient Visit Level 4 Established [id number] PC, Encounter for immunization  Anemia  Blurred vision  Diabetes mellitus type 2, controlled  Hematuria  HLD (hyperlipidemia)  HTN (hypertension)  Hypothyroidism  Overweight  PND (post-nasal drip)  Positive fecal occult  blood test  Skin lesion...  ?  HLD (hyperlipidemia)  ??Low fat diet and exercise [3]  Ordered:  Office/Outpatient Visit Level 4 Established [id number] , Encounter for immunization  Anemia  Blurred vision  Diabetes mellitus type 2, controlled  Hematuria  HLD (hyperlipidemia)  HTN (hypertension)  Hypothyroidism  Overweight  PND (post-nasal drip)  Positive fecal occult blood test  Skin lesion...  ?  HTN (hypertension)  ??BP controlled. Low fat low salt diet and exercise. Cont med as prescribed. [4]  Ordered:  Comprehensive Metabolic Panel, Routine collect, *Est. 04/22/19 3:00:00 CDT, Blood, Order for future visit, *Est. Stop date 04/22/19 3:00:00 CDT, Lab Collect, HTN (hypertension), 10/22/18 7:45:00 CDT  Office/Outpatient Visit Level 4 Established [id number] , Encounter for immunization  Anemia  Blurred vision  Diabetes mellitus type 2, controlled  Hematuria  HLD (hyperlipidemia)  HTN (hypertension)  Hypothyroidism  Overweight  PND (post-nasal drip)  Positive fecal occult blood test  Skin lesion...  ?  Hypothyroidism  ??Cont med as prescribed. [5] Cont med as prescribed. [5]  Ordered:  Free T4, Routine collect, *Est. 04/22/19 3:00:00 CDT, Blood, Order for future visit, *Est. Stop date 04/22/19 3:00:00 CDT, Lab Collect, Hypothyroidism, 10/22/18 7:45:00 CDT  Office/Outpatient Visit Level 4 Established [id number] , Encounter for immunization  Anemia  Blurred vision  Diabetes mellitus type 2, controlled  Hematuria  HLD (hyperlipidemia)  HTN (hypertension)  Hypothyroidism  Overweight  PND (post-nasal drip)  Positive fecal occult blood test  Skin lesion...  Thyroid Stimulating Hormone, Routine collect, *Est. 04/22/19 3:00:00 CDT, Blood, Order for future visit, *Est. Stop date 04/22/19 3:00:00 CDT, Lab Collect, Hypothyroidism, 10/22/18 7:45:00 CDT  ?  Left ear pain  ?Bilat TMs clar. Pt states she has been getting Left ear pain off and on for the past month. Refer to ENT for eval and  mgmt.  Ordered:  Internal Referral to ENT, Left anterior ear pain, *Est. 11/05/18 3:00:00 CST, Future Visit?, Left ear pain  ?  Overweight  ??Encouraged low fat diet and exercise. Education provided. [6]  Ordered:  Office/Outpatient Visit Level 4 Established [id number] , Encounter for immunization  Anemia  Blurred vision  Diabetes mellitus type 2, controlled  Hematuria  HLD (hyperlipidemia)  HTN (hypertension)  Hypothyroidism  Overweight  PND (post-nasal drip)  Positive fecal occult blood test  Skin lesion...  ?  PND (post-nasal drip)  ??Encouraged Mucinex OTC as prescribed per package prn PND. Drink plenty of water. [7]  Ordered:  Office/Outpatient Visit Level 4 Established [id number] , Encounter for immunization  Anemia  Blurred vision  Diabetes mellitus type 2, controlled  Hematuria  HLD (hyperlipidemia)  HTN (hypertension)  Hypothyroidism  Overweight  PND (post-nasal drip)  Positive fecal occult blood test  Skin lesion...  ?  Positive fecal occult blood test  ?Pt had colonoscopy done 5-14-17 WNL- documented repeat colonoscopy in 10 years. [8]  Ordered:  Office/Outpatient Visit Level 4 Established [id number] , Encounter for immunization  Anemia  Blurred vision  Diabetes mellitus type 2, controlled  Hematuria  HLD (hyperlipidemia)  HTN (hypertension)  Hypothyroidism  Overweight  PND (post-nasal drip)  Positive fecal occult blood test  Skin lesion...  ?  Skin lesion  ?Pt was referred to minor surgery cl for eval and mgmt. [9] Appt Cancelled in computer.  Ordered:  Office/Outpatient Visit Level 4 Established [id number] , Encounter for immunization  Anemia  Blurred vision  Diabetes mellitus type 2, controlled  Hematuria  HLD (hyperlipidemia)  HTN (hypertension)  Hypothyroidism  Overweight  PND (post-nasal drip)  Positive fecal occult blood test  Skin lesion...  ?  Well adult exam  ?Labs in 6 months. [facility] appt today. Pt UTD with GYN exam and colonoscopy.  Ordered:  Office/Outpatient  Visit Level 4 Established [id number] , Encounter for immunization  Anemia  Blurred vision  Diabetes mellitus type 2, controlled  Hematuria  HLD (hyperlipidemia)  HTN (hypertension)  Hypothyroidism  Overweight  PND (post-nasal drip)  Positive fecal occult blood test  Skin lesion...  ?  Orders:  diclofenac, 75 mg = 1 tab(s), Oral, BID, PRN PRN as needed for pain, # 60 tab(s), 6 Refill(s), Pharmacy: [facility]  gabapentin, 300 mg = 1 cap(s), Oral, TID, # 270 cap(s), 3 Refill(s), Pharmacy: [facility]  levothyroxine, 175 mcg = 1 tab(s), Oral, Daily, # 90 tab(s), 3 Refill(s), Pharmacy: [facility]  lisinopril, 10 mg = 1 tab(s), Oral, Daily, # 90 tab(s), 3 Refill(s), Pharmacy: [facility]  metFORMIN, 500 mg = 1 tab(s), Oral, Daily, # 90 tab(s), 3 Refill(s), Pharmacy: [facility]  pravastatin, 40 mg = 1 tab(s), Oral, Daily, # 90 tab(s), 3 Refill(s), Pharmacy: [facility]  Pathology Non-Gyn Request [facility], 10/22/18 6:20:00 CDT, AP Specimen, Urine, Collected, [id number], 10/22/18 6:47:20 CDT, RT - Routine, [id number], 10/22/18 6:20:00 CDT  Surgical Path Final Report, [id number], [id number], RT - Routine  RTC in 6 months with labs 1 week prior to appt.   Problem List/Past Medical History  Ongoing  Arthritis  Diabetes  HTN - Hypertension  Hyperlipemia  Hypothyroidism  Historical  DM (diabetes mellitus)  HTN (hypertension)  Hyperlipidemia  Procedure/Surgical History  Colonoscopy (05/04/2017)  Colonoscopy, flexible; diagnostic, including collection of specimen(s) by brushing or washing, when performed (separate procedure) (05/04/2017)  Inspection of Lower Intestinal Tract, Via Natural or Artificial Opening Endoscopic (05/04/2017)  gall bladder  Shaving of bone  Tubal ligation   Medications  Blood Glucose test strips Wave sense presto, See Instructions, 11 refills  [facility]rum W[facility]  diclofenac  sodium 75 mg oral delayed release tablet, 75 mg= 1 tab(s), Oral, BID, PRN, 6 refills  gabapentin 300 mg oral capsule, 300 mg= 1 cap(s), Oral, TID, 3 refills  levothyroxine 175 mcg (0.175 mg) oral tablet, 175 mcg= 1 tab(s), Oral, Daily, 3 refills  lisinopril 10 mg oral tablet, 10 mg= 1 tab(s), Oral, Daily, 3 refills  metformin 500 mg oral tablet, 500 mg= 1 tab(s), Oral, Daily, 3 refills  omega-3 polyunsaturated fatty acids oral cap  pravastatin 40 mg oral tablet, 40 mg= 1 tab(s), Oral, Daily, 3 refills  Allergies  codeine  Social History  Alcohol - Denies Alcohol Use, 11/22/2014  Never, 10/02/2015  Employment/School  Part time, 04/11/2017  Unemployed, Highest education level: High school., 10/02/2015  Unemployed, Highest education level: None., 04/02/2015  Exercise - Occasional exercise, 04/02/2015  Self assessment: Good condition., 10/02/2015  Home/Environment  Lives with Spouse. Living situation: Home/Independent. Home equipment: Glucose monitoring. Alcohol abuse in household: No. Substance abuse in household: No. Smoker in household: No. Injuries/Abuse/Neglect in household: No. Feels unsafe at home: No. Safe place to go: Yes. Family/Friends available for support: Yes., 10/02/2015  Lives with Spouse. Living situation: Home/Independent. Alcohol abuse in household: No. Substance abuse in household: No. Smoker in household: No. Injuries/Abuse/Neglect in household: No. Feels unsafe at home: No. Safe place to go: Yes. Agency(s)/Others notified: No. Family/Friends available for support: Yes. Concern for family members at home: No. Major illness in household: No. Financial concerns: No. TV/Computer concerns: No. Risks in environment: Pets/Animal exposure., 04/02/2015  Nutrition/Health  Wants to lose weight: Yes. Sleeping concerns: Yes. Feels highly stressed: No., 04/11/2017  Type of diet: NO ADDED SUGAR., 10/02/2015  Other  Sexual  Substance Abuse - Denies Substance Abuse, 11/22/2014  Never, 10/02/2015  Tobacco - Denies  Tobacco Use, 11/22/2014  Never smoker, N/A, 10/22/2018  Never smoker Use:., 07/17/2018  Family History  Acute myocardial infarction.: Father.  Cancer: Sister.  Heart disease: Sister.  Heart murmur.: Brother.  Immunizations  Vaccine Date Status Comments   influenza virus vaccine, inactivated 10/11/2017 Given    tetanus-diphtheria toxoids 10/11/2016 Given    influenza virus vaccine, inactivated 10/11/2016 Given    influenza virus vaccine, inactivated 10/19/2015 Given    pneumococcal 23-polyvalent vaccine 10/19/2015 Given    influenza virus vaccine, inactivated - Not Given Expectation Not Necessary     influenza virus vaccine, inactivated 10/03/2014 Recorded    Health Maintenance  Health Maintenance  ???Pending?(in the next year)  ??? ??Due?  ??? ? ? ?ADL Screening due??10/22/18??and every 1??year(s)  ??? ? ? ?Influenza Vaccine due??10/22/18??and every?  ??? ??Due In Future?  ??? ? ? ?Hypertension Management-BMP not due until??04/03/19??and every 1??year(s)  ??? ? ? ?Diabetes Maintenance-Microalbumin not due until??04/03/19??and every 1??year(s)  ??? ? ? ?Diabetes Maintenance-Serum Creatinine not due until??04/03/19??and every 1??year(s)  ??? ? ? ?Diabetes Maintenance-Foot Exam not due until??04/16/19??and every 1??year(s)  ??? ? ? ?Hypertension Management-Education not due until??04/16/19??and every 1??year(s)  ??? ? ? ?Alcohol Misuse Screening not due until??04/16/19??and every 1??year(s)  ??? ? ? ?Diabetes Maintenance-Medication Prescribed not due until??04/16/19??and every 1??year(s)  ??? ? ? ?Diabetes Maintenance-Eye Exam not due until??07/17/19??and every 1??year(s)  ??? ? ? ?Diabetes Maintenance-Fasting Lipid Profile not due until??07/30/19??and every 1??year(s)  ??? ? ? ?Breast Cancer Screening not due until??10/19/19??and every 2??year(s)  ???Satisfied?(in the past 1 year)  ??? ??Satisfied?  ??? ? ? ?Alcohol Misuse Screening on??04/16/18.??Satisfied by Alfredito PALMA, Marita GERARDO.  ??? ? ? ?Blood Pressure Screening  on??10/22/18.??Satisfied by Mary Lou Lopez LPN  ??? ? ? ?Body Mass Index Check on??10/22/18.??Satisfied by Mary Lou Lopez LPN  ??? ? ? ?Cervical Cancer Screening on??04/30/18.??Satisfied by Haritha Pinzon  ??? ? ? ?Colorectal Screening on??05/25/18.??Satisfied by Shannon Sagastume  ??? ? ? ?Depression Screening on??10/22/18.??Satisfied by Mary Lou Lopez LPN [place]  ??? ? ? ?Diabetes Maintenance-Eye Exam on??07/17/18.??Satisfied by Caterina Rubio  ??? ? ? ?Diabetes Maintenance-Fasting Lipid Profile on??07/30/18.??Satisfied by Elena Rojo  ??? ? ? ?Diabetes Maintenance-Foot Exam on??04/16/18.??Satisfied by Marita Glaser NP  ??? ? ? ?Diabetes Maintenance-HgbA1c on??10/22/18.??Satisfied by Oj Patricio Jr.  ??? ? ? ?Diabetes Maintenance-Medication Prescribed on??04/16/18.??Satisfied by Marita Glaser NP  ??? ? ? ?Diabetes Maintenance-Microalbumin on??04/03/18.??Satisfied by Shannon Sagastume  ??? ? ? ?Diabetes Maintenance-Serum Creatinine on??04/03/18.??Satisfied by Shannon Sagastume  ??? ? ? ?Diabetes Maintenance-Urine Dipstick on??10/22/18.??Satisfied by Tisha Strong  ??? ? ? ?Diabetes Screening on??10/22/18.??Satisfied by Oj Patricio Jr.  ??? ? ? ?Hypertension Management-Blood Pressure on??10/22/18.??Satisfied by Mary Lou Lopez LPN  ??? ? ? ?Influenza Vaccine on??10/22/18.??Satisfied by Mary Lou Lopez LPN  ??? ? ? ?Lipid Screening on??07/30/18.??Satisfied by Elena Rojo  ??? ? ? ?Obesity Screening on??10/22/18.??Satisfied by Mary Lou Lopez LPN  ??? ??Canceled?  ??? ? ? ?Smoking Cessation on??04/16/18.?Recorded by Marita Glaser NP?Reason: Patient Refuses  ?  ?     [1]?Office Visit Note; Marita Glaser NP 04/16/2018 07:30 CDT  [2]?Office Visit Note; Marita Glaser NP 04/16/2018 07:30 CDT  [3]?Office Visit Note; Marita Glaser NP 04/16/2018 07:30 CDT  [4]?Office Visit Note; Marita Glaser NP  04/16/2018 07:30 CDT  [5]?Office Visit Note; Marita Glaser NP 04/16/2018 07:30 CDT  [6]?Office Visit Note; Marita Glaser NP 04/16/2018 07:30 CDT  [7]?Office Visit Note; Marita Glaser NP 04/16/2018 07:30 CDT  [8]?Office Visit Note; Marita Glaser NP 04/16/2018 07:30 CDT  [9]?Office Visit Note; Marita Glaser NP 04/16/2018 07:30 CDT

## 2022-05-04 NOTE — HISTORICAL [facility] CERNER
This is a historical note converted from [employer]. Formatting and pictures may have been removed.  Please reference [employer] for original formatting and attached multimedia. History of Present Illness  This is a 65-year-old female who presents to the clinic today with epigastric pain that started this morning. ?Patient reports?she has taken?Maalox?without relief.? Patient denies any?jaw pain, left arm pain, chest pressure.  Review of Systems  Constitutional: No fever, No chills, No sweats, No fatigue, No weight loss.  Eyes: No blurring.  Ear/Nose/Mouth/Throat: No nasal congestion, No vertigo.  Respiratory: No shortness of breath, No cough, No sputum production, No wheezing, No sleep apnea, No exertional dyspnea.  Cardiovascular:? chest pain. No palpitations, No claudication, No orthopnea, No peripheral edema.  Gastrointestinal:?Epigastric pain, nausea, No vomiting, No diarrhea, No constipation, No abdominal pain.?  Genitourinary: No dysuria, No hematuria.  Hematology/Lymphatics: No swollen lymph glands.  Endocrine: No excessive thirst, No polyuria, No cold intolerance, No heat intolerance.  Musculoskeletal: No joint pain, No muscle pain.  Integumentary: No rash.  Neurologic: No altered mental status, No headache.  Psychiatric: No anxiety, No depression, Not suicidal  Physical Exam  General: ?Alert and oriented, No acute distress. ?  Eye: ?Pupils are equal, round and reactive to light, Extraocular movements are intact, Normal conjunctiva. ?  HENT: ?Normocephalic, No damage to dentition, Tympanic membranes are clear, Good light reflex, Normal hearing, Oral mucosa is moist, No pharyngeal erythema, No sinus tenderness. ?Neck: ?Supple, Non-tender. ?  Respiratory: ?Lungs are clear to auscultation, Respirations are non-labored, Breath sounds are equal, Symmetrical chest wall expansion. ?  Cardiovascular: ?Normal rate, Regular rhythm, No murmur, No gallop, Good pulses equal in all extremities, Normal peripheral perfusion, No  edema. ?  Gastrointestinal: ?Soft, Non-tender, Non-distended, Normal bowel sounds. ?  Genitourinary: ?No costovertebral angle tenderness. ?  Musculoskeletal: ?Normal range of motion, Normal strength, No tenderness, No swelling, No deformity, Normal gait. ?  Integumentary: ?Warm, Dry, Pink. ??  Neurologic: ?Alert, Oriented, Normal sensory, Normal motor function, No focal deficits. ?  Cognition and Speech: ?Oriented, Speech clear and coherent, Functional cognition intact. ?  Psychiatric: ?Cooperative, Appropriate mood & affect, Normal judgment, Non-suicidal. ?  ?  Assessment/Plan  Chest pain?R07.9  EKG done in office today showed sinus bradycardia, otherwise normal ECG.? Cardiac enzymes negative.? Abdominal ultrasound ordered.??Zofran 4 mg as needed?and Protonix 40 mg daily?sent to pharmacy.  Ordered:  Creatine Kinase, Timed collect, 01/11/21 10:53:00 CST, Blood, q6hr for 3 dose(s), Stop date 01/12/21 4:59:00 CST, Lab Collect, Chest pain  Epigastric pain, 01/11/21 11:00:00 CST  Creatine Kinase MB, Timed collect, 01/11/21 10:53:00 CST, Blood, q6hr for 3 dose(s), Stop date 01/12/21 4:59:00 CST, Lab Collect, Epigastric pain  Chest pain, 01/11/21 11:00:00 CST  Troponin-I, Timed collect, 01/11/21 10:53:00 CST, Blood, q6hr for 3 dose(s), Stop date 01/12/21 4:59:00 CST, Lab Collect, Epigastric pain  Chest pain, 01/11/21 11:00:00 CST  US Abdomen Complete, Routine, 01/13/21 8:00:00 CST, Epigastric Pain, Abdominal Pain, None, Ambulatory, Rad Type, Order for future visit, Epigastric pain  Chest pain, Schedule this test, [facility], 01/13/21 8:00:00 CST  ?  HTN - Hypertension?I10  ?132/76 in office. ?Continue current treatment.  ?  Hypothyroidism?E03.9  ?TSH normal. ?Continue current treatment.  ?  Orders:  ondansetron, 4 mg = 1 tab(s), Oral, q8hr, PRN PRN as needed for nausea/vomiting, # 15 tab(s), 0 Refill(s), Pharmacy: [id number], 167.7, cm, Height/Length Dosing, 01/11/21 9:54:00 CST, 84.1, kg, Weight  Dosing, 01/11/21 9:54:00 CST  pantoprazole, 40 mg = 1 tab(s), Oral, Daily, # 30 tab(s), 11 Refill(s), Pharmacy: [place] [address] [id number], 167.7, cm, Height/Length Dosing, 01/11/21 9:54:00 CST, 84.1, kg, Weight Dosing, 01/11/21 9:54:00 CST  Report to ER with any chest pain, palpitations, chest pressure.? Will call with results?of?cardiac enzymes and abdominal ultrasound.   Problem List/Past Medical History  Ongoing  Arthritis  Bilateral hand pain  Bilateral hip pain  Bilateral knee pain  HTN - Hypertension  Hyperlipemia  Hypothyroidism  Medicare annual wellness visit, subsequent  Historical  DM (diabetes mellitus)  HTN (hypertension)  Hyperlipidemia  Pregnant  Pregnant  Procedure/Surgical History  Drainage of Right Upper Arm Skin, External Approach (02/14/2019)  Puncture aspiration of abscess, hematoma, bulla, or cyst (02/14/2019)  Colonoscopy (05/04/2017)  Colonoscopy, flexible; diagnostic, including collection of specimen(s) by brushing or washing, when performed (separate procedure) (05/04/2017)  Inspection of Lower Intestinal Tract, Via Natural or Artificial Opening Endoscopic (05/04/2017)  gall bladder  Shaving of bone  Tubal ligation   Medications  acetaminophen 650 mg E.R. oral tablet, (note: 8 hr ER tab), See Instructions  Blood Glucose Test Strips, See Instructions, 3 refills  Centrum Womens  cetirizine 10 mg oral tablet, See Instructions, 6 refills  diclofenac 1% topical gel, 1 citlalli, TOP, QID, PRN, 6 refills  diclofenac sodium 75 mg oral delayed release tablet, 75 mg= 1 tab(s), Oral, BID, 1 refills  EpiPen 2-Yves 0.3 mg injectable kit, 0.3 mg= 1 EA, IM, As Directed  Fluzone High-Dose Quadrivalent PF [id number] intramuscular suspension, 0.7 mL, IM, Once  gabapentin 300 mg oral capsule, 300 mg= 1 cap(s), Oral, TID, 3 refills  Jardiance 10 mg oral tablet, 10 mg= 1 tab(s), Oral, qAM, 5 refills  lisinopril 10 mg oral tablet, 10 mg= 1 tab(s), Oral, Daily, 3 refills  pravastatin 40 mg oral tablet, 40 mg= 1 tab(s), Oral,  Daily, 3 refills  Protonix 40 mg ORAL enteric coated tablet, 40 mg= 1 tab(s), Oral, Daily, 11 refills  Synthroid 150 mcg (0.15 mg) oral tablet, 150 mcg= 1 tab(s), Oral, Daily, 6 refills  vitamin E 400 intl units oral capsule, 400 IntUnit= 1 cap(s), Oral, Daily  Zofran 4 mg oral tablet, 4 mg= 1 tab(s), Oral, q8hr, PRN  Allergies  Bactrim?(severe)  codeine  sulfa drugs?(anaphylactic)  Social History  Abuse/Neglect  No, No, Yes, 11/23/2020  Alcohol - Denies Alcohol Use, 11/22/2014  Never, 10/02/2015  Employment/School  Part time, 04/11/2017  Unemployed, Highest education level: High school., 10/02/2015  Unemployed, Highest education level: None., 04/02/2015  Exercise - Occasional exercise, 04/02/2015  Exercise type: NONE., 11/06/2020  Home/Environment  Lives with Spouse. Living situation: Home/Independent. Home equipment: Glucose monitoring. Alcohol abuse in household: No. Substance abuse in household: No. Smoker in household: No. Injuries/Abuse/Neglect in household: No. Feels unsafe at home: No. Safe place to go: Yes. Family/Friends available for support: Yes., 10/02/2015  Lives with Spouse. Living situation: Home/Independent. Alcohol abuse in household: No. Substance abuse in household: No. Smoker in household: No. Injuries/Abuse/Neglect in household: No. Feels unsafe at home: No. Safe place to go: Yes. Agency(s)/Others notified: No. Family/Friends available for support: Yes. Concern for family members at home: No. Major illness in household: No. Financial concerns: No. TV/Computer concerns: No. Risks in environment: Pets/Animal exposure., 04/02/2015  Nutrition/Health  Diabetic, 11/06/2020  Other  Sexual  Sexually active: No., 06/15/2020  Sexually active: No. Sexual orientation: Dont know. Gender Identity Identifies as female., 01/24/2019  Spiritual/Cultural  Sikh, 09/16/2019  Substance Use - Denies Substance Abuse, 11/22/2014  Never, 10/02/2015  Tobacco - Denies Tobacco Use, 11/22/2014  Never (less than 100 in  lifetime), N/A, 11/23/2020  Family History  Acute myocardial infarction.: Father.  Arrhythmia: Brother.  CVA - Cerebrovascular accident: Mother, Father and Brother.  Cancer: Sister.  Cervical cancer: Sister.  Congestive heart disease.: Brother.  DVT - Deep vein thrombosis: Sister.  Defibrillator: Brother.  Diabetes mellitus type 2: Mother, Sister and Sister.  Esophageal bleeding: Brother.  Gall bladder: Sister and Brother.  Heart disease: Sister.  Heart failure.: Mother.  Heart murmur.: Mother and Brother.  Hyperlipidemia.: Brother.  Hyperthyroidism: Sister.  Hyperthyroidism.: Sister and Sister.  Lung cancer: Mother.  Mitral valve disorder.: Mother and Brother.  Peptic ulcer disease: Father.  Peripheral vascular disease.: Father.  Pilonidal cyst: Sister.  Ulcerative colitis.: Sister.  Immunizations  Vaccine Date Status Comments   pneumococcal 23-polyvalent vaccine 01/15/2020 Given    influenza virus vaccine, inactivated 10/15/2019 Given    pneumococcal 13-valent conjugate vaccine 10/22/2018 Given    influenza virus vaccine, inactivated 10/22/2018 Given    influenza virus vaccine, inactivated 10/11/2017 Given    tetanus-diphtheria toxoids 10/11/2016 Given    influenza virus vaccine, inactivated 10/11/2016 Given    influenza virus vaccine, inactivated 10/19/2015 Given    pneumococcal 23-polyvalent vaccine 10/19/2015 Given    influenza virus vaccine, inactivated - Not Given Expectation Not Necessary     influenza virus vaccine, inactivated 10/03/2014 Recorded    Health Maintenance  Health Maintenance  ???Pending?(in the next year)  ??? ??OverDue  ??? ? ? ?Influenza Vaccine due??10/01/20??and every 1??day(s)  ??? ? ? ?Advance Directive due??01/02/21??and every 1??year(s)  ??? ? ? ?Cognitive Screening due??01/02/21??and every 1??year(s)  ??? ? ? ?Fall Risk Assessment due??01/02/21??and every 1??year(s)  ??? ? ? ?Functional Assessment due??01/02/21??and every 1??year(s)  ??? ??Due?  ??? ? ? ?Alcohol Misuse Screening  due??01/02/21??and every 1??year(s)  ??? ? ? ?Depression Screening due??01/08/21??and every 1??year(s)  ??? ? ? ?Aspirin Therapy for CVD Prevention due??01/09/21??and every 1??year(s)  ??? ? ? ?Zoster Vaccine due??01/11/21??Unknown Frequency  ??? ??Due In Future?  ??? ? ? ?Diabetes Maintenance-Foot Exam not due until??07/06/21??and every 1??year(s)  ??? ? ? ?Medicare Annual Wellness Exam not due until??07/06/21??and every 1??year(s)  ??? ? ? ?Hypertension Management-Education not due until??07/06/21??and every 1??year(s)  ??? ? ? ?Hypertension Management-BMP not due until??07/08/21??and every 1??year(s)  ??? ? ? ?Diabetes Maintenance-Fasting Lipid Profile not due until??07/08/21??and every 1??year(s)  ??? ? ? ?Diabetes Maintenance-Serum Creatinine not due until??07/08/21??and every 1??year(s)  ??? ? ? ?Diabetes Maintenance-Eye Exam not due until??07/16/21??and every 2??year(s)  ??? ? ? ?ADL Screening not due until??11/06/21??and every 1??year(s)  ??? ? ? ?Diabetes Maintenance-Medication Prescribed not due until??11/23/21??and every 1??year(s)  ??? ? ? ?Obesity Screening not due until??01/01/22??and every 1??year(s)  ???Satisfied?(in the past 1 year)  ??? ??Satisfied?  ??? ? ? ?ADL Screening on??11/06/20.??Satisfied by Elizabeth Hu LPN  ??? ? ? ?Blood Pressure Screening on??01/11/21.??Satisfied by Tisha Owen LPN  ??? ? ? ?Body Mass Index Check on??01/11/21.??Satisfied by Tisha Owen LPN  ??? ? ? ?Bone Density Screening on??01/20/20.??Satisfied by Shae Flores  ??? ? ? ?Breast Cancer Screening on??10/26/20.??Satisfied by Chery Rojas  ??? ? ? ?Cervical Cancer Screening on??06/15/20.??Satisfied by Gustavo Simmons  ??? ? ? ?Diabetes Maintenance-HgbA1c on??01/11/21.??Satisfied by Leanne Pederson  ??? ? ? ?Diabetes Maintenance-Medication Prescribed on??11/23/20.??Satisfied by Radha Ponce NP  ??? ? ? ?Diabetes Maintenance-Fasting Lipid Profile on??07/08/20.??Satisfied by Moody  Isaura  ??? ? ? ?Diabetes Maintenance-Serum Creatinine on??07/08/20.??Satisfied by Isaura Uriostegui  ??? ? ? ?Diabetes Maintenance-Foot Exam on??07/06/20.??Satisfied by Yoanna Purdy  ??? ? ? ?Diabetes Screening on??01/11/21.??Satisfied by Leanne Pederson  ??? ? ? ?Fall Risk Assessment on??03/13/20.??Satisfied by Lu Dobson LPN  ??? ? ? ?Functional Assessment on??01/15/20.??Satisfied by Patricia Turner RN  ??? ? ? ?Hypertension Management-Blood Pressure on??01/11/21.??Satisfied by Tisha Owen LPN  ??? ? ? ?Hypertension Management-BMP on??07/08/20.??Satisfied by Isaura Uriotsegui  ??? ? ? ?Hypertension Management-Education on??07/06/20.??Satisfied by Yoanna Purdy  ??? ? ? ?Influenza Vaccine on??11/23/20.  ??? ? ? ?Lipid Screening on??07/08/20.??Satisfied by Isaura Uriostegui  ??? ? ? ?Medicare Annual Wellness Exam on??07/06/20.??Satisfied by Yoanna Purdy  ??? ? ? ?Obesity Screening on??01/11/21.??Satisfied by Tisha Owen LPN  ??? ? ? ?Pneumococcal Vaccine on??01/15/20.??Satisfied by Patricia Turner RN  ?

## 2022-05-04 NOTE — HISTORICAL [facility] CERNER
This is a historical note converted from [place]. Formatting and pictures may have been removed.  Please reference Edmund for original formatting and attached multimedia. Chief Complaint  LT FLANK PAIN [id number]  History of Present Illness  flank pain on the left started 3 weeks ago, then she tripped on a rug and fell.?  now left lower back and lower back pain;?left outer hip pain over the greater trochanter.  cleans houses 5 days per week.  fell onto left elbow. pain there has improved but was severe.  no head injury, no LOC.  no hematuria.  is Rxed diclofenac for bilateral knee arthritis by her Ortho provider and has been taking that for years.  Review of Systems  Constitutional:?no fever, fatigue, weakness  Eye:?no vision loss, eye redness, drainage, or pain  ENMT:?no sore throat, ear pain, sinus pain/congestion, nasal congestion/drainage  Respiratory:?no cough, no wheezing, no shortness of breath  Cardiovascular:?no chest pain, no palpitations, no edema  Gastrointestinal:?no nausea, vomiting, or diarrhea. No abdominal pain  Genitourinary:?no dysuria, no urinary frequency or urgency, no hematuria  Hema/Lymph:?no abnormal bruising or bleeding  Endocrine:?no heat or cold intolerance, no excessive thirst or excessive urination  Musculoskeletal:?lower back pain, left mid back pain, left elbow pain, left outer hip pain. No limitation in elbow ROM. Able to stand and walk with some discomfort.  Integumentary:?no skin rash or abnormal lesion; no bruising over the back or flank.  Neurologic: no headache, no dizziness, no weakness or numbness  ?  Physical Exam  Vitals & Measurements  T:?36.7? ?C (Oral)? HR:?56(Peripheral)? RR:?20? BP:?115/81? SpO2:?97%?  HT:?167.00?cm? WT:?82.500?kg? BMI:?29.58?  Assessment/Plan  1.?Fall from standing?W19.XXXA  ?XRs all negative for fractures.  Arthritis and degenerative changes have progressed in L Spine.  Please make appt with Pricila in Ortho for update for plan of care.  NSAID  overuse.  Ordered:  diclofenac topical, 1 citlalli, TOP, QID, PRN PRN as needed for pain, # 100 gm, 1 Refill(s), Pharmacy: [facility], 167, cm, Height/Length Dosing, 11/06/20 11:20:00 CST, 82.5, kg, Weight Dosing, 11/06/20 11:20:00 CST  naproxen, 500 mg = 1 tab(s), Oral, BID, PRN PRN for pain, Stop Diclofenac. Take this with food, X 10 day(s), # 20 tab(s), 0 Refill(s), Pharmacy: [facility], 167, cm, Height/Length Dosing, 11/06/20 11:20:00 CST, 82.5, kg, Weight Dosing, 11/...  ?  2.?Unspecified abdominal pain?R10.9  11/6/2020 11:24 CST  ?  Yellow  Clear  5.5 PH  1.025 Spec Grav  Negative Blood  1/2 (500mg/dl) Glucose  Negative Ketones  Negative Protein  Negative Bilirubin  0.2 mg/dl Urobilinogen  Negative Nitrites  Negative Leukocytes  ?  ?  3.?Low back pain?M54.5  ?Please stop the Diclofenac until you see your PCP again.  This medication is damaging to kidneys and you have a history of elevated Creatinine, plus Diabetes.  Ordered:  naproxen, 500 mg = 1 tab(s), Oral, BID, PRN PRN for pain, Stop Diclofenac. Take this with food, X 10 day(s), # 20 tab(s), 0 Refill(s), Pharmacy: [facility], 167, cm, Height/Length Dosing, 11/06/20 11:20:00 CST, 82.5, kg, Weight Dosing, 11/...  ?  4.?Hip pain?M25.559  ?Please stop the Diclofenac until you see your PCP again.  This medication is damaging to kidneys and you have a history of elevated Creatinine, plus Diabetes.  Ordered:  naproxen, 500 mg = 1 tab(s), Oral, BID, PRN PRN for pain, Stop Diclofenac. Take this with food, X 10 day(s), # 20 tab(s), 0 Refill(s), Pharmacy: [facility], 167, cm, Height/Length Dosing, 11/06/20 11:20:00 CST, 82.5, kg, Weight Dosing, 11/...  ?  5.?Elbow pain?M25.529  ?Please stop the Diclofenac until you see your PCP again.  This medication is damaging to kidneys and you have a history of elevated Creatinine, plus Diabetes.  Ordered:  naproxen, 500 mg = 1 tab(s), Oral, BID, PRN  PRN for pain, Stop Diclofenac. Take this with food, X 10 day(s), # 20 tab(s), 0 Refill(s), Pharmacy: [facility], 167, cm, Height/Length Dosing, 11/06/20 11:20:00 CST, 82.5, kg, Weight Dosing, 11/...  ?  Referrals  [facility] Internal Referral to Family Medicine Clinic, Specialty: Primary Care Physicians, Reason: Change PCP Med Mgmt DM II, HTN, advanced OA, Refer To: Melvi Lopez, [facility], [address] [place] [address]., Start: 11/06/20 14:25:00 CST   Problem List/Past Medical History  Ongoing  Arthritis  Bilateral hand pain  Bilateral hip pain  Bilateral knee pain  HTN - Hypertension  Hyperlipemia  Hypothyroidism  Medicare annual wellness visit, subsequent  Historical  DM (diabetes mellitus)  HTN (hypertension)  Hyperlipidemia  Pregnant  Pregnant  Procedure/Surgical History  Drainage of Right Upper Arm Skin, External Approach (02/14/2019)  Puncture aspiration of abscess, hematoma, bulla, or cyst (02/14/2019)  Colonoscopy (05/04/2017)  Colonoscopy, flexible; diagnostic, including collection of specimen(s) by brushing or washing, when performed (separate procedure) (05/04/2017)  Inspection of Lower Intestinal Tract, Via Natural or Artificial Opening Endoscopic (05/04/2017)  gall bladder  Shaving of bone  Tubal ligation   Medications  acetaminophen 650 mg E.R. oral tablet, (note: 8 hr ER tab), See Instructions  Blood Glucose test strips Wave sense presto, See Instructions, 11 refills  Centrum Womens  cetirizine 10 mg oral tablet, See Instructions,? ?Not taking  diclofenac 1% topical gel, 1 citlalli, TOP, QID, PRN, 1 refills  EpiPen 2-Yves 0.3 mg injectable kit, 0.3 mg= 1 EA, IM, As Directed  gabapentin 300 mg oral capsule, 300 mg= 1 cap(s), Oral, TID, 3 refills  Jardiance 10 mg oral tablet, 10 mg= 1 tab(s), Oral, qAM, 5 refills  lisinopril 10 mg oral tablet, 10 mg= 1 tab(s), Oral, Daily, 3 refills  naproxen 500 mg oral tablet, 500 mg= 1 tab(s), Oral,  BID, PRN  pravastatin 40 mg oral tablet, 40 mg= 1 tab(s), Oral, Daily, 3 refills  Synthroid 150 mcg (0.15 mg) oral tablet, 150 mcg= 1 tab(s), Oral, Daily, 11 refills  vitamin E 400 intl units oral capsule, 400 IntUnit= 1 cap(s), Oral, Daily  Allergies  Bactrim?(severe)  codeine  sulfa drugs?(anaphylactic)  Social History  Abuse/Neglect  No, No, Yes, 11/06/2020  Alcohol - Denies Alcohol Use, 11/22/2014  Never, 10/02/2015  Employment/School  Part time, 04/11/2017  Unemployed, Highest education level: High school., 10/02/2015  Unemployed, Highest education level: None., 04/02/2015  Exercise - Occasional exercise, 04/02/2015  Exercise type: NONE., 11/06/2020  Home/Environment  Lives with Spouse. Living situation: Home/Independent. Home equipment: Glucose monitoring. Alcohol abuse in household: No. Substance abuse in household: No. Smoker in household: No. Injuries/Abuse/Neglect in household: No. Feels unsafe at home: No. Safe place to go: Yes. Family/Friends available for support: Yes., 10/02/2015  Lives with Spouse. Living situation: Home/Independent. Alcohol abuse in household: No. Substance abuse in household: No. Smoker in household: No. Injuries/Abuse/Neglect in household: No. Feels unsafe at home: No. Safe place to go: Yes. Agency(s)/Others notified: No. Family/Friends available for support: Yes. Concern for family members at home: No. Major illness in household: No. Financial concerns: No. TV/Computer concerns: No. Risks in environment: Pets/Animal exposure., 04/02/2015  Nutrition/Health  Diabetic, 11/06/2020  Other  Sexual  Sexually active: No., 06/15/2020  Sexually active: No. Sexual orientation: Dont know. Gender Identity Identifies as female., 01/24/2019  Spiritual/Cultural  Oriental orthodox, 09/16/2019  Substance Use - Denies Substance Abuse, 11/22/2014  Never, 10/02/2015  Tobacco - Denies Tobacco Use, 11/22/2014  Never (less than 100 in lifetime), N/A, 11/06/2020  Family History  Acute myocardial infarction.:  Father.  Arrhythmia: Brother.  CVA - Cerebrovascular accident: Mother, Father and Brother.  Cancer: Sister.  Cervical cancer: Sister.  Congestive heart disease.: Brother.  DVT - Deep vein thrombosis: Sister.  Defibrillator: Brother.  Diabetes mellitus type 2: Mother, Sister and Sister.  Esophageal bleeding: Brother.  Gall bladder: Sister and Brother.  Heart disease: Sister.  Heart failure.: Mother.  Heart murmur.: Mother and Brother.  Hyperlipidemia.: Brother.  Hyperthyroidism: Sister.  Hyperthyroidism.: Sister and Sister.  Lung cancer: Mother.  Mitral valve disorder.: Mother and Brother.  Peptic ulcer disease: Father.  Peripheral vascular disease.: Father.  Pilonidal cyst: Sister.  Ulcerative colitis.: Sister.  Immunizations  Vaccine Date Status Comments   pneumococcal 23-polyvalent vaccine 01/15/2020 Given    influenza virus vaccine, inactivated 10/15/2019 Given    pneumococcal 13-valent conjugate vaccine 10/22/2018 Given    influenza virus vaccine, inactivated 10/22/2018 Given    influenza virus vaccine, inactivated 10/11/2017 Given    tetanus-diphtheria toxoids 10/11/2016 Given    influenza virus vaccine, inactivated 10/11/2016 Given    influenza virus vaccine, inactivated 10/19/2015 Given    pneumococcal 23-polyvalent vaccine 10/19/2015 Given    influenza virus vaccine, inactivated - Not Given Expectation Not Necessary     influenza virus vaccine, inactivated 10/03/2014 Recorded    Health Maintenance  Health Maintenance  ???Pending?(in the next year)  ??? ??OverDue  ??? ? ? ?Advance Directive due??01/02/20??and every 1??year(s)  ??? ??Due?  ??? ? ? ?Influenza Vaccine due??10/01/20??and every 1??day(s)  ??? ? ? ?Zoster Vaccine due??11/06/20??Unknown Frequency  ??? ??Due In Future?  ??? ? ? ?Obesity Screening not due until??01/01/21??and every 1??year(s)  ??? ? ? ?Alcohol Misuse Screening not due until??01/02/21??and every 1??year(s)  ??? ? ? ?Cognitive Screening not due until??01/02/21??and every 1??year(s)  ???  ? ? ?Fall Risk Assessment not due until??01/02/21??and every 1??year(s)  ??? ? ? ?Functional Assessment not due until??01/02/21??and every 1??year(s)  ??? ? ? ?Depression Screening not due until??01/08/21??and every 1??year(s)  ??? ? ? ?Aspirin Therapy for CVD Prevention not due until??01/09/21??and every 1??year(s)  ??? ? ? ?Diabetes Maintenance-Foot Exam not due until??07/06/21??and every 1??year(s)  ??? ? ? ?Diabetes Maintenance-Medication Prescribed not due until??07/06/21??and every 1??year(s)  ??? ? ? ?Medicare Annual Wellness Exam not due until??07/06/21??and every 1??year(s)  ??? ? ? ?Hypertension Management-Education not due until??07/06/21??and every 1??year(s)  ??? ? ? ?Hypertension Management-BMP not due until??07/08/21??and every 1??year(s)  ??? ? ? ?Diabetes Maintenance-Fasting Lipid Profile not due until??07/08/21??and every 1??year(s)  ??? ? ? ?Diabetes Maintenance-Serum Creatinine not due until??07/08/21??and every 1??year(s)  ??? ? ? ?Diabetes Maintenance-Eye Exam not due until??07/16/21??and every 2??year(s)  ??? ? ? ?Diabetes Maintenance-HgbA1c not due until??10/26/21??and every 1??year(s)  ???Satisfied?(in the past 1 year)  ??? ??Satisfied?  ??? ? ? ?ADL Screening on??11/06/20.??Satisfied by Elizabeth Hu LPN.  ??? ? ? ?Alcohol Misuse Screening on??01/09/20.??Satisfied by Yoanna Purdy  ??? ? ? ?Aspirin Therapy for CVD Prevention on??01/09/20.??Satisfied by Yoanna Purdy  ??? ? ? ?Blood Pressure Screening on??11/06/20.??Satisfied by Elizabeth Hu LPN.  ??? ? ? ?Body Mass Index Check on??11/06/20.??Satisfied by Elizabeth Hu LPN.  ??? ? ? ?Bone Density Screening on??01/20/20.??Satisfied by Shae Flores  ??? ? ? ?Breast Cancer Screening on??10/26/20.??Satisfied by Chery Rojas  ??? ? ? ?Cervical Cancer Screening on??06/15/20.??Satisfied by Gustavo Simmons  ??? ? ? ?Cognitive Screening on??01/09/20.??Satisfied by Yoanna Purdy  ??? ? ? ?Depression Screening  on??01/09/20.??Satisfied by Baljit NP-Yoanna ROSARIO  ??? ? ? ?Diabetes Maintenance-HgbA1c on??10/26/20.??Satisfied by Leanne Pederson  ??? ? ? ?Diabetes Maintenance-Fasting Lipid Profile on??07/08/20.??Satisfied by Isaura Uriostegui  ??? ? ? ?Diabetes Maintenance-Serum Creatinine on??07/08/20.??Satisfied by Isaura Uriostegui  ??? ? ? ?Diabetes Maintenance-Medication Prescribed on??07/06/20.??Satisfied by [facility] MINERVA-Yoanna ROSARIO  ??? ? ? ?Diabetes Maintenance-Foot Exam on??07/06/20.??Satisfied by [facility] Yoanna BENNETT  ??? ? ? ?Diabetes Screening on??10/26/20.??Satisfied by Leanne Pederson  ??? ? ? ?Fall Risk Assessment on??03/13/20.??Satisfied by Lu Dobson LPN  ??? ? ? ?Functional Assessment on??01/15/20.??Satisfied by Patricia Turner RN  ??? ? ? ?Hypertension Management-Blood Pressure on??11/06/20.??Satisfied by Elizabeth Hu LPN  ??? ? ? ?Hypertension Management-BMP on??07/08/20.??Satisfied by Isaura Uriostegui  ??? ? ? ?Hypertension Management-Education on??07/06/20.??Satisfied by BaljitYoanna Mo  ??? ? ? ?Lipid Screening on??07/08/20.??Satisfied by Isaura Uriostegui  ??? ? ? ?Medicare Annual Wellness Exam on??07/06/20.??Satisfied by [facility] Yoanna BENNETT  ??? ? ? ?Obesity Screening on??11/06/20.??Satisfied by Elizabeth Hu LPN  ??? ? ? ?Pneumococcal Vaccine on??01/15/20.??Satisfied by Patricia Turner RN  ?  Diagnostic Results  (11/06/2020 12:11 CST XR Spine Lumbar 2 or 3 Views)  XR Spine Lumbar 2 or 3 Views  ?  INDICATION  Low back pain  ?  Comparison: Radiographs dated 10/22/2018  ?  FINDINGS  There are 5 nonrib-bearing lumbar-type vertebral bodies. There is a  rotational dextrocurvature scoliotic curvature of the lumbar spine  centered at L2. Alignment is otherwise preserved. The vertebral body  heights are maintained. There is multilevel degenerative disc disease  with marginal osteophyte formation and mild disc height loss. Facet  arthropathy is worse in the lower lumbar spine. The soft tissues  are  unremarkable.  ?  IMPRESSION  1. ?No acute abnormality identified.  2. ?Dextroscoliotic curvature of the lumbar spine with multilevel  degenerative changes.  ?  Signature Line  Electronically Signed By: Madai Flaherty MD  Date/Time Signed: 11/06/2020 13:41 [1] (11/06/2020 12:10 CST XR Hip Left 2 Views)  XR Hip Left 2 Views  ?  REASON FOR EXAM: Pain  ?  COMPARISON: Abdomen flat and erect 08/30/2019  ?  FINDINGS: The osseous structures are intact without evidence of  fracture or dislocation. The osseous structures are otherwise  unremarkable. The joint spaces and soft tissues are unremarkable.  ?  ?  IMPRESSION: As above.  ?  Signature Line  Electronically Signed By: Devon Mccloud MD  Date/Time Signed: 11/06/2020 14:02 [2] (11/06/2020 12:09 CST XR Elbow Left Minimum 3 Views)  XR Elbow Left Minimum 3 Views  ?  REASON FOR EXAM: Trauma  ?  COMPARISON: No relevant comparison studies available at the time of  dictation.  ?  FINDINGS: The osseous structures are intact without evidence of  fracture or dislocation. The osseous structures are otherwise  unremarkable. The joint spaces and soft tissues are unremarkable.  ?  ?  IMPRESSION: As above.  ?  Signature Line  Electronically Signed By: Devon Mccloud MD  Date/Time Signed: 11/06/2020 13:57 [3]     [1]?XR Spine Lumbar 2 or 3 Views; Madai Flaherty MD 11/06/2020 12:11 CST  [2]?XR Hip Left 2 Views; Devon Mccloud MD 11/06/2020 12:10 CST  [3]?XR Elbow Left Minimum 3 Views; Devon Mccloud MD 11/06/2020 12:09 CST

## 2022-05-25 ENCOUNTER — OFFICE VISIT (OUTPATIENT)
Dept: GYNECOLOGY | Facility: CLINIC | Age: 67
End: 2022-05-25
Payer: MEDICARE

## 2022-05-25 VITALS
HEART RATE: 60 BPM | BODY MASS INDEX: 28.56 KG/M2 | RESPIRATION RATE: 20 BRPM | DIASTOLIC BLOOD PRESSURE: 70 MMHG | TEMPERATURE: 98 F | SYSTOLIC BLOOD PRESSURE: 118 MMHG | WEIGHT: 182 LBS | HEIGHT: 67 IN | OXYGEN SATURATION: 99 %

## 2022-05-25 DIAGNOSIS — L90.0 LICHEN SCLEROSUS: Primary | ICD-10-CM

## 2022-05-25 PROCEDURE — 99214 OFFICE O/P EST MOD 30 MIN: CPT | Mod: PBBFAC | Performed by: OBSTETRICS & GYNECOLOGY

## 2022-05-25 RX ORDER — ZINC GLUCONATE 50 MG
50 TABLET ORAL DAILY
COMMUNITY

## 2022-05-25 RX ORDER — LANOLIN ALCOHOL/MO/W.PET/CERES
1 CREAM (GRAM) TOPICAL
COMMUNITY

## 2022-05-25 RX ORDER — EMPAGLIFLOZIN 10 MG/1
10 TABLET, FILM COATED ORAL NIGHTLY
COMMUNITY
Start: 2022-01-24

## 2022-05-25 RX ORDER — DEXTROMETHORPHAN HYDROBROMIDE, GUAIFENESIN 5; 100 MG/5ML; MG/5ML
650 LIQUID ORAL 2 TIMES DAILY
COMMUNITY

## 2022-05-25 RX ORDER — PANTOPRAZOLE SODIUM 40 MG/1
40 TABLET, DELAYED RELEASE ORAL DAILY
COMMUNITY
Start: 2022-03-30 | End: 2022-09-20 | Stop reason: SDUPTHER

## 2022-05-25 RX ORDER — LISINOPRIL 10 MG/1
10 TABLET ORAL DAILY
COMMUNITY
Start: 2022-05-01 | End: 2022-10-27

## 2022-05-25 RX ORDER — GABAPENTIN 300 MG/1
300 CAPSULE ORAL 3 TIMES DAILY
COMMUNITY
Start: 2022-04-10 | End: 2022-06-20 | Stop reason: SDUPTHER

## 2022-05-25 RX ORDER — CETIRIZINE HYDROCHLORIDE 10 MG/1
10 TABLET ORAL DAILY
COMMUNITY
Start: 2021-06-25 | End: 2022-06-20

## 2022-05-25 RX ORDER — DICLOFENAC SODIUM 75 MG/1
75 TABLET, DELAYED RELEASE ORAL 2 TIMES DAILY
COMMUNITY
Start: 2022-04-26 | End: 2022-06-27

## 2022-05-25 RX ORDER — MULTIVITAMIN
1 TABLET ORAL DAILY
COMMUNITY

## 2022-05-25 RX ORDER — ASCORBIC ACID 500 MG
500 TABLET ORAL DAILY
COMMUNITY

## 2022-05-25 RX ORDER — CONJUGATED ESTROGENS 0.62 MG/G
1 CREAM VAGINAL
COMMUNITY
Start: 2021-09-24 | End: 2022-05-30 | Stop reason: SDUPTHER

## 2022-05-25 RX ORDER — LEVOTHYROXINE SODIUM 150 MCG
150 TABLET ORAL DAILY
COMMUNITY
Start: 2022-05-22 | End: 2022-06-20

## 2022-05-25 NOTE — MEDICAL/APP STUDENT
"  [facility] GYNECOLOGY CLINIC NOTE     Rhoda Cesar is a 66 y.o.  PMH of T2DM presenting to GYN clinic for f/u vaginal exam to compare healing of lichen sclerosus with prior visit on 10/25/21. Treated with Clobetasol. Pt states she is doing much better. Denies itching, pain, or discoloration. Still using Premarin intermittently.  No other complaints.  Denies CP, SOB, LE ankle edema.      Gynecology  OB History            PMH   DM  Arthritis  Hand pain  Hip Pain  HTN  HLD  Hypothyroidism    PSH  Tubal ligation  Gall bladder  Colonoscopy   Puncture aspiration of abscess upper arm (skin)    Current Outpatient Medications   Medication Instructions    acetaminophen (TYLENOL) 650 mg, Oral, Every 8 hours    ALBUTEROL INHL 2 puffs, Inhalation, Daily PRN    ascorbic acid (vitamin C) (VITAMIN C) 500 mg, Oral, Daily    cetirizine (ZYRTEC) 10 mg, Oral, Daily    conjugated estrogens (PREMARIN) vaginal cream 1 applicator, Vaginal, Every Tues, Th    diclofenac (VOLTAREN) 75 mg, Oral, 2 times daily    ferrous sulfate (FEOSOL) Tab tablet 1 tablet, Oral, With breakfast    gabapentin (NEURONTIN) 300 mg, Oral, 3 times daily    JARDIANCE 10 mg, Oral, Nightly    lisinopriL 10 mg, Oral, Daily    multivitamin (ONE DAILY MULTIVITAMIN) per tablet 1 tablet, Oral, Daily    pantoprazole (PROTONIX) 40 mg, Oral, Daily    SYNTHROID 150 mcg, Oral, Daily    vitamin E 200 Units, Oral, Daily    zinc gluconate 50 mg, Oral, Daily       Social History     Tobacco Use    Smoking status: Never Smoker    Smokeless tobacco: Never Used   Substance Use Topics    Alcohol use: Not Currently    Drug use: Never       Review of Systems  Pertinent items are noted in HPI.     Objective:     /70   Pulse 60   Temp 98.3 °F (36.8 °C)   Resp 20   Ht 5' 7" (1.702 m)   Wt 82.6 kg (182 lb)   SpO2 99%   BMI 28.51 kg/m²   Physical Exam:  Gen: Alert, cooperative, in no acute distress.  HEENT: No thyromegaly, goiter, or " masses  Chest: Clear and equal, no wheezes/rales/rhonchi  Abdomen: Soft, non-tender, no masses.  Punch biopsy location: healed  Extrem: Extremities normal, atraumatic, non-tender calves.  External genitalia: Normal female genetalia, no discharge or tenderness.  Note: RN chaperone present for entirety of exam.  External genitalia: Normal female genetalia without lesion, discharge or tenderness.  Speculum Exam: agllutenation of the introitus, vaginal vault without discharge, nonodorous, no lesions/masses seen.  Cervical os visualized as closed, no lesions/masses.   Bimanual Exam: No cervical motion tenderness.  Uterus freely mobile.  Normal size uterus. No adnexal masses. Nontender exam.       Surgical Path report Final Diagnosis:  Vulva, punch biopsy  Lichen sclerosus  No evidence of Malignancy.      Assessment:      66 y.o. F . Here for f/u of Lichen sclerosus treated with Clobetasol.  Feeling much better. No complaints today.    Plan:     Lichen Sclerosus resolved.    Premarin Cream 2-3 times weekly.  Clobetasol every night if itching returns.      Problem List Items Addressed This Visit    None        Return to clinic 6 months.    Discussed patient and plan with Dr. Sebastian.

## 2022-05-26 PROBLEM — L90.0 LICHEN SCLEROSUS: Status: ACTIVE | Noted: 2022-05-26

## 2022-05-26 NOTE — ASSESSMENT & PLAN NOTE
Significant improvement lichen sclerosis noted.  The patient can continue to use premarin 3 times weekly.  She can stop using clobetasol. If itching or other symptoms return, call for sooner appointment. Otherwise follow up in 6months.

## 2022-05-26 NOTE — PROGRESS NOTES
"  [facility] GYNECOLOGY CLINIC NOTE     Rhoda Cesar is a 66 y.o.  presents to clinic for follow up of lichen sclerosus.  Treated with Clobetasol. Pt states she is doing much better. Denies itching, pain, or discoloration. Still using Premarin intermittently, but has stopped the clobetasol.  No other complaints.    Gynecology  OB History            PMH   DM  Arthritis  Hand pain  Hip Pain  HTN  HLD  Hypothyroidism    PSH  Tubal ligation  Gall bladder  Colonoscopy   Puncture aspiration of abscess upper arm (skin)    Current Outpatient Medications   Medication Instructions    acetaminophen (TYLENOL) 650 mg, Oral, Every 8 hours    ALBUTEROL INHL 2 puffs, Inhalation, Daily PRN    ascorbic acid (vitamin C) (VITAMIN C) 500 mg, Oral, Daily    cetirizine (ZYRTEC) 10 mg, Oral, Daily    conjugated estrogens (PREMARIN) vaginal cream 1 applicator, Vaginal, Every Tues, Thurs    diclofenac (VOLTAREN) 75 mg, Oral, 2 times daily    ferrous sulfate (FEOSOL) Tab tablet 1 tablet, Oral, With breakfast    gabapentin (NEURONTIN) 300 mg, Oral, 3 times daily    JARDIANCE 10 mg, Oral, Nightly    lisinopriL 10 mg, Oral, Daily    multivitamin (ONE DAILY MULTIVITAMIN) per tablet 1 tablet, Oral, Daily    pantoprazole (PROTONIX) 40 mg, Oral, Daily    SYNTHROID 150 mcg, Oral, Daily    vitamin E 200 Units, Oral, Daily    zinc gluconate 50 mg, Oral, Daily       Social History     Tobacco Use    Smoking status: Never Smoker    Smokeless tobacco: Never Used   Substance Use Topics    Alcohol use: Not Currently    Drug use: Never       Review of Systems  Pertinent items are noted in HPI.     Objective:     /70   Pulse 60   Temp 98.3 °F (36.8 °C)   Resp 20   Ht 5' 7" (1.702 m)   Wt 82.6 kg (182 lb)   SpO2 99%   BMI 28.51 kg/m²   Physical Exam:  Gen: Alert, cooperative, in no acute distress.  HEENT: No thyromegaly, goiter, or masses  Chest: Clear and equal, no wheezes/rales/rhonchi  Abdomen: Soft, " non-tender, no masses.  Punch biopsy location: healed  Extrem: Extremities normal, atraumatic, non-tender calves.  External genitalia: No areas of thinning noted as previously described. Agglutination of labia minora noted but improved.   Speculum Exam: vaginal vault without discharge, nonodorous, no lesions/masses seen.  Cervical os visualized as closed, no lesions/masses.   Bimanual Exam: No cervical motion tenderness.  Uterus freely mobile.  No adnexal masses. Nontender exam.       Assessment:      66 y.o. F . Here for f/u of Lichen sclerosus treated with Clobetasol.    Plan:       Problem List Items Addressed This Visit        Derm    Lichen sclerosus - Primary     Significant improvement lichen sclerosis noted.  The patient can continue to use premarin 3 times weekly.  She can stop using clobetasol. If itching or other symptoms return, call for sooner appointment. Otherwise follow up in 6months.                 Return to clinic 6 months.    Discussed patient and plan with Dr. Sebastian.    Lili Houser MD  [employer] [facility] PGY-4

## 2022-05-30 ENCOUNTER — TELEPHONE (OUTPATIENT)
Dept: GYNECOLOGY | Facility: CLINIC | Age: 67
End: 2022-05-30
Payer: MEDICARE

## 2022-05-30 DIAGNOSIS — N95.2 ATROPHIC VAGINITIS: Primary | ICD-10-CM

## 2022-05-30 NOTE — TELEPHONE ENCOUNTER
Pt called and said she needs a refill on her premarin and triamcinolone topical. Please advise.

## 2022-05-30 NOTE — TELEPHONE ENCOUNTER
Order placed for Premarin. Pt has been off of Triamcinolone. GYN recently stopped Clobetasol on 5/25/22. Pt denies any need for the Clobetasol.

## 2022-06-03 ENCOUNTER — TELEPHONE (OUTPATIENT)
Dept: GYNECOLOGY | Facility: CLINIC | Age: 67
End: 2022-06-03
Payer: MEDICARE

## 2022-06-03 NOTE — TELEPHONE ENCOUNTER
----- Message from Zoya Rubio sent at 5/31/2022  3:08 PM CDT -----  Regarding: GYN Nurse  Patient calling because the two creams that were prescribed for her are to expensive and wants to know if we could send in something else. Name of one cream  is Premarin and pt couldn't remember the other.Please call and advise.

## 2022-06-10 ENCOUNTER — TELEPHONE (OUTPATIENT)
Dept: ADMINISTRATIVE | Facility: HOSPITAL | Age: 67
End: 2022-06-10
Payer: MEDICARE

## 2022-06-10 NOTE — TELEPHONE ENCOUNTER
Only have Jardiance 25 mg supply. Requested 10 mg from rep today she stated supply should be in next week.

## 2022-06-10 NOTE — TELEPHONE ENCOUNTER
Type:  Needs Medical Advice    Who Called: selfSymptoms (please be specific): na   How long has patient had these symptoms:  na  Pharmacy name and phone #:  na  Would the patient rather a call back or a response via [facility]? Call back  Best Call Back Number: [phone]  Additional Information: pt stated that she is out of her jardiance please advise

## 2022-06-18 ENCOUNTER — LAB VISIT (OUTPATIENT)
Dept: LAB | Facility: HOSPITAL | Age: 67
End: 2022-06-18
Attending: NURSE PRACTITIONER
Payer: MEDICARE

## 2022-06-18 DIAGNOSIS — I10 ESSENTIAL HYPERTENSION, MALIGNANT: ICD-10-CM

## 2022-06-18 DIAGNOSIS — E55.9 MILD VITAMIN D DEFICIENCY: ICD-10-CM

## 2022-06-18 DIAGNOSIS — Z00.00 ROUTINE GENERAL MEDICAL EXAMINATION AT A HEALTH CARE FACILITY: Primary | ICD-10-CM

## 2022-06-18 DIAGNOSIS — R79.9 ABNORMAL FINDING OF BLOOD CHEMISTRY, UNSPECIFIED: ICD-10-CM

## 2022-06-18 LAB
ALBUMIN SERPL-MCNC: 3.8 GM/DL (ref 3.4–4.8)
ALBUMIN/GLOB SERPL: 1.2 RATIO (ref 1.1–2)
ALP SERPL-CCNC: 95 UNIT/L (ref 40–150)
ALT SERPL-CCNC: 26 UNIT/L (ref 0–55)
AST SERPL-CCNC: 19 UNIT/L (ref 5–34)
BASOPHILS # BLD AUTO: 0.02 X10(3)/MCL (ref 0–0.2)
BASOPHILS NFR BLD AUTO: 0.3 %
BILIRUBIN DIRECT+TOT PNL SERPL-MCNC: 0.7 MG/DL
BUN SERPL-MCNC: 18.4 MG/DL (ref 9.8–20.1)
CALCIUM SERPL-MCNC: 9.3 MG/DL (ref 8.4–10.2)
CHLORIDE SERPL-SCNC: 105 MMOL/L (ref 98–107)
CHOLEST SERPL-MCNC: 259 MG/DL
CHOLEST/HDLC SERPL: 6 {RATIO} (ref 0–5)
CO2 SERPL-SCNC: 27 MMOL/L (ref 23–31)
CREAT SERPL-MCNC: 0.68 MG/DL (ref 0.55–1.02)
DEPRECATED CALCIDIOL+CALCIFEROL SERPL-MC: 47 NG/ML (ref 30–80)
EOSINOPHIL # BLD AUTO: 0.13 X10(3)/MCL (ref 0–0.9)
EOSINOPHIL NFR BLD AUTO: 1.9 %
ERYTHROCYTE [DISTWIDTH] IN BLOOD BY AUTOMATED COUNT: 12.9 % (ref 11.5–17)
EST. AVERAGE GLUCOSE BLD GHB EST-MCNC: 148.5 MG/DL
GLOBULIN SER-MCNC: 3.3 GM/DL (ref 2.4–3.5)
GLUCOSE SERPL-MCNC: 138 MG/DL (ref 82–115)
HBA1C MFR BLD: 6.8 %
HCT VFR BLD AUTO: 49.2 % (ref 37–47)
HDLC SERPL-MCNC: 45 MG/DL (ref 35–60)
HGB BLD-MCNC: 14.8 GM/DL (ref 12–16)
IMM GRANULOCYTES # BLD AUTO: 0 X10(3)/MCL (ref 0–0.02)
IMM GRANULOCYTES NFR BLD AUTO: 0 % (ref 0–0.43)
LDLC SERPL CALC-MCNC: 183 MG/DL (ref 50–140)
LYMPHOCYTES # BLD AUTO: 1.47 X10(3)/MCL (ref 0.6–4.6)
LYMPHOCYTES NFR BLD AUTO: 21.7 %
MCH RBC QN AUTO: 27.7 PG (ref 27–31)
MCHC RBC AUTO-ENTMCNC: 30.1 MG/DL (ref 33–36)
MCV RBC AUTO: 92 FL (ref 80–94)
MONOCYTES # BLD AUTO: 0.62 X10(3)/MCL (ref 0.1–1.3)
MONOCYTES NFR BLD AUTO: 9.2 %
NEUTROPHILS # BLD AUTO: 4.5 X10(3)/MCL (ref 2.1–9.2)
NEUTROPHILS NFR BLD AUTO: 66.9 %
PLATELET # BLD AUTO: 218 X10(3)/MCL (ref 130–400)
PMV BLD AUTO: 9.4 FL (ref 9.4–12.4)
POTASSIUM SERPL-SCNC: 5.2 MMOL/L (ref 3.5–5.1)
PROT SERPL-MCNC: 7.1 GM/DL (ref 5.8–7.6)
RBC # BLD AUTO: 5.35 X10(6)/MCL (ref 4.2–5.4)
SODIUM SERPL-SCNC: 142 MMOL/L (ref 136–145)
TRIGL SERPL-MCNC: 153 MG/DL (ref 37–140)
TSH SERPL-ACNC: 2.58 UIU/ML (ref 0.35–4.94)
VLDLC SERPL CALC-MCNC: 31 MG/DL
WBC # SPEC AUTO: 6.8 X10(3)/MCL (ref 4.5–11.5)

## 2022-06-18 PROCEDURE — 80053 COMPREHEN METABOLIC PANEL: CPT

## 2022-06-18 PROCEDURE — 36415 COLL VENOUS BLD VENIPUNCTURE: CPT

## 2022-06-18 PROCEDURE — 85025 COMPLETE CBC W/AUTO DIFF WBC: CPT

## 2022-06-18 PROCEDURE — 80061 LIPID PANEL: CPT

## 2022-06-18 PROCEDURE — 83036 HEMOGLOBIN GLYCOSYLATED A1C: CPT

## 2022-06-18 PROCEDURE — 82306 VITAMIN D 25 HYDROXY: CPT

## 2022-06-18 PROCEDURE — 84443 ASSAY THYROID STIM HORMONE: CPT

## 2022-06-20 ENCOUNTER — OFFICE VISIT (OUTPATIENT)
Dept: FAMILY MEDICINE | Facility: CLINIC | Age: 67
End: 2022-06-20
Payer: MEDICARE

## 2022-06-20 VITALS
RESPIRATION RATE: 18 BRPM | OXYGEN SATURATION: 97 % | HEIGHT: 67 IN | HEART RATE: 67 BPM | SYSTOLIC BLOOD PRESSURE: 112 MMHG | BODY MASS INDEX: 28.94 KG/M2 | TEMPERATURE: 98 F | DIASTOLIC BLOOD PRESSURE: 69 MMHG | WEIGHT: 184.38 LBS

## 2022-06-20 DIAGNOSIS — Z00.00 WELLNESS EXAMINATION: ICD-10-CM

## 2022-06-20 DIAGNOSIS — E78.5 HYPERLIPIDEMIA, UNSPECIFIED HYPERLIPIDEMIA TYPE: ICD-10-CM

## 2022-06-20 DIAGNOSIS — I73.9 CLAUDICATION OF BOTH LOWER EXTREMITIES: ICD-10-CM

## 2022-06-20 DIAGNOSIS — M19.90 ARTHRITIS: ICD-10-CM

## 2022-06-20 DIAGNOSIS — N95.2 ATROPHIC VAGINITIS: Primary | ICD-10-CM

## 2022-06-20 DIAGNOSIS — I10 HYPERTENSION, UNSPECIFIED TYPE: ICD-10-CM

## 2022-06-20 DIAGNOSIS — E11.9 TYPE 2 DIABETES MELLITUS WITHOUT COMPLICATION, UNSPECIFIED WHETHER LONG TERM INSULIN USE: ICD-10-CM

## 2022-06-20 DIAGNOSIS — E03.9 HYPOTHYROIDISM, UNSPECIFIED TYPE: ICD-10-CM

## 2022-06-20 PROCEDURE — 3074F SYST BP LT 130 MM HG: CPT | Mod: ,,, | Performed by: NURSE PRACTITIONER

## 2022-06-20 PROCEDURE — 99397 PR PREVENTIVE VISIT,EST,65 & OVER: ICD-10-PCS | Mod: ,,, | Performed by: NURSE PRACTITIONER

## 2022-06-20 PROCEDURE — 1101F PR PT FALLS ASSESS DOC 0-1 FALLS W/OUT INJ PAST YR: ICD-10-PCS | Mod: ,,, | Performed by: NURSE PRACTITIONER

## 2022-06-20 PROCEDURE — 99397 PER PM REEVAL EST PAT 65+ YR: CPT | Mod: ,,, | Performed by: NURSE PRACTITIONER

## 2022-06-20 PROCEDURE — 3078F PR MOST RECENT DIASTOLIC BLOOD PRESSURE < 80 MM HG: ICD-10-PCS | Mod: ,,, | Performed by: NURSE PRACTITIONER

## 2022-06-20 PROCEDURE — 1160F RVW MEDS BY RX/DR IN RCRD: CPT | Mod: ,,, | Performed by: NURSE PRACTITIONER

## 2022-06-20 PROCEDURE — 1160F PR REVIEW ALL MEDS BY PRESCRIBER/CLIN PHARMACIST DOCUMENTED: ICD-10-PCS | Mod: ,,, | Performed by: NURSE PRACTITIONER

## 2022-06-20 PROCEDURE — 1159F PR MEDICATION LIST DOCUMENTED IN MEDICAL RECORD: ICD-10-PCS | Mod: ,,, | Performed by: NURSE PRACTITIONER

## 2022-06-20 PROCEDURE — 1159F MED LIST DOCD IN RCRD: CPT | Mod: ,,, | Performed by: NURSE PRACTITIONER

## 2022-06-20 PROCEDURE — 3074F PR MOST RECENT SYSTOLIC BLOOD PRESSURE < 130 MM HG: ICD-10-PCS | Mod: ,,, | Performed by: NURSE PRACTITIONER

## 2022-06-20 PROCEDURE — 3008F PR BODY MASS INDEX (BMI) DOCUMENTED: ICD-10-PCS | Mod: ,,, | Performed by: NURSE PRACTITIONER

## 2022-06-20 PROCEDURE — 4010F PR ACE/ARB THEARPY RXD/TAKEN: ICD-10-PCS | Mod: ,,, | Performed by: NURSE PRACTITIONER

## 2022-06-20 PROCEDURE — 3288F PR FALLS RISK ASSESSMENT DOCUMENTED: ICD-10-PCS | Mod: ,,, | Performed by: NURSE PRACTITIONER

## 2022-06-20 PROCEDURE — 4010F ACE/ARB THERAPY RXD/TAKEN: CPT | Mod: ,,, | Performed by: NURSE PRACTITIONER

## 2022-06-20 PROCEDURE — 3288F FALL RISK ASSESSMENT DOCD: CPT | Mod: ,,, | Performed by: NURSE PRACTITIONER

## 2022-06-20 PROCEDURE — 1101F PT FALLS ASSESS-DOCD LE1/YR: CPT | Mod: ,,, | Performed by: NURSE PRACTITIONER

## 2022-06-20 PROCEDURE — 3078F DIAST BP <80 MM HG: CPT | Mod: ,,, | Performed by: NURSE PRACTITIONER

## 2022-06-20 PROCEDURE — 3008F BODY MASS INDEX DOCD: CPT | Mod: ,,, | Performed by: NURSE PRACTITIONER

## 2022-06-20 RX ORDER — GABAPENTIN 300 MG/1
600 CAPSULE ORAL 2 TIMES DAILY
Qty: 120 CAPSULE | Refills: 11 | Status: SHIPPED | OUTPATIENT
Start: 2022-06-20 | End: 2023-06-12

## 2022-06-21 ENCOUNTER — PATIENT MESSAGE (OUTPATIENT)
Dept: ADMINISTRATIVE | Facility: HOSPITAL | Age: 67
End: 2022-06-21
Payer: MEDICARE

## 2022-06-23 PROBLEM — E03.9 HYPOTHYROIDISM: Status: ACTIVE | Noted: 2022-06-23

## 2022-06-23 PROBLEM — E78.5 HYPERLIPIDEMIA: Status: ACTIVE | Noted: 2022-06-23

## 2022-06-23 PROBLEM — I73.9 CLAUDICATION OF BOTH LOWER EXTREMITIES: Status: ACTIVE | Noted: 2022-06-23

## 2022-06-23 PROBLEM — M19.90 ARTHRITIS: Status: ACTIVE | Noted: 2022-06-23

## 2022-06-23 PROBLEM — Z00.00 WELLNESS EXAMINATION: Status: ACTIVE | Noted: 2022-06-23

## 2022-06-23 NOTE — PROGRESS NOTES
Subjective:       Patient ID: Rhoda Cesar is a 66 y.o. female.    Chief Complaint: Annual Exam    This is a 66-year-old female who presents to the clinic for wellness exam lab review.    Review of Systems   Constitutional: Positive for fatigue.   HENT: Negative.    Eyes: Negative.    Respiratory: Negative.    Cardiovascular: Negative.    Gastrointestinal: Negative.    Endocrine: Negative.    Genitourinary: Negative.    Musculoskeletal: Positive for arthralgias, leg pain and myalgias.   Integumentary:  Negative.   Allergic/Immunologic: Negative.    Neurological: Negative.    Hematological: Negative.    Psychiatric/Behavioral: Negative.          Objective:      Physical Exam  Constitutional:       Appearance: Normal appearance. She is obese.   HENT:      Head: Normocephalic.      Right Ear: Tympanic membrane, ear canal and external ear normal.      Left Ear: Tympanic membrane, ear canal and external ear normal.      Nose: Nose normal.      Mouth/Throat:      Mouth: Mucous membranes are moist.      Pharynx: Oropharynx is clear.   Eyes:      Extraocular Movements: Extraocular movements intact.      Conjunctiva/sclera: Conjunctivae normal.      Pupils: Pupils are equal, round, and reactive to light.   Cardiovascular:      Rate and Rhythm: Normal rate and regular rhythm.      Pulses: Normal pulses.      Heart sounds: Normal heart sounds.   Pulmonary:      Effort: Pulmonary effort is normal.      Breath sounds: Normal breath sounds.   Abdominal:      General: Bowel sounds are normal.      Palpations: Abdomen is soft.   Musculoskeletal:         General: Normal range of motion.      Cervical back: Normal range of motion and neck supple.   Skin:     General: Skin is warm and dry.   Neurological:      General: No focal deficit present.      Mental Status: She is alert and oriented to person, place, and time. Mental status is at baseline.   Psychiatric:         Mood and Affect: Mood normal.         Behavior: Behavior normal.          Thought Content: Thought content normal.         Judgment: Judgment normal.         Results for orders placed or performed in visit on 06/18/22   Comprehensive Metabolic Panel   Result Value Ref Range    Sodium Level 142 136 - 145 mmol/L    Potassium Level 5.2 (H) 3.5 - 5.1 mmol/L    Chloride 105 98 - 107 mmol/L    Carbon Dioxide 27 23 - 31 mmol/L    Glucose Level 138 (H) 82 - 115 mg/dL    Blood Urea Nitrogen 18.4 9.8 - 20.1 mg/dL    Creatinine 0.68 0.55 - 1.02 mg/dL    Calcium Level Total 9.3 8.4 - 10.2 mg/dL    Protein Total 7.1 5.8 - 7.6 gm/dL    Albumin Level 3.8 3.4 - 4.8 gm/dL    Globulin 3.3 2.4 - 3.5 gm/dL    Albumin/Globulin Ratio 1.2 1.1 - 2.0 ratio    Bilirubin Total 0.7 <=1.5 mg/dL    Alkaline Phosphatase 95 40 - 150 unit/L    Alanine Aminotransferase 26 0 - 55 unit/L    Aspartate Aminotransferase 19 5 - 34 unit/L    Estimated GFR-Non  >60 mls/min/1.73/m2   Hemoglobin A1C   Result Value Ref Range    Hemoglobin A1c 6.8 <=7.0 %    Estimated Average Glucose 148.5 mg/dL   Lipid Panel   Result Value Ref Range    Cholesterol Total 259 (H) <=200 mg/dL    HDL Cholesterol 45 35 - 60 mg/dL    Triglyceride 153 (H) 37 - 140 mg/dL    Cholesterol/HDL Ratio 6 (H) 0 - 5    Very Low Density Lipoprotein 31     LDL Cholesterol 183.00 (H) 50.00 - 140.00 mg/dL   TSH   Result Value Ref Range    Thyroid Stimulating Hormone 2.5800 0.3500 - 4.9400 uIU/mL   Vitamin D   Result Value Ref Range    Vit D 25 OH 47.0 30.0 - 80.0 ng/mL   CBC with Differential   Result Value Ref Range    WBC 6.8 4.5 - 11.5 x10(3)/mcL    RBC 5.35 4.20 - 5.40 x10(6)/mcL    Hgb 14.8 12.0 - 16.0 gm/dL    Hct 49.2 (H) 37.0 - 47.0 %    MCV 92.0 80.0 - 94.0 fL    MCH 27.7 27.0 - 31.0 pg    MCHC 30.1 (L) 33.0 - 36.0 mg/dL    RDW 12.9 11.5 - 17.0 %    Platelet 218 130 - 400 x10(3)/mcL    MPV 9.4 9.4 - 12.4 fL    Neut % 66.9 %    Lymph % 21.7 %    Mono % 9.2 %    Eos % 1.9 %    Basophil % 0.3 %    Lymph # 1.47 0.6 - 4.6 x10(3)/mcL    Neut #  4.5 2.1 - 9.2 x10(3)/mcL    Mono # 0.62 0.1 - 1.3 x10(3)/mcL    Eos # 0.13 0 - 0.9 x10(3)/mcL    Baso # 0.02 0 - 0.2 x10(3)/mcL    IG# 0.00 0 - 0.0155 x10(3)/mcL    IG% 0.0 0 - 0.43 %       Assessment:       Problem List Items Addressed This Visit        Cardiac/Vascular    Claudication of both lower extremities     Referral sent to cardio.           Hypertension     Blood pressure 112/69.  Dash diet advised.  Continue current management.           Hyperlipidemia     Low-fat, low-cholesterol diet advised.              Endocrine    Hypothyroidism    Diabetes     Hemoglobin A1c 6.8.  ADA diet advised.  Continue current management.              Orthopedic    Arthritis     Stable.  Continue current management.              Other    Wellness examination     Healthy lifestyle discussed. Mammogram up-to-date.  Cervical cancer screening up-to-date.  Colon cancer screening up-to-date.             Other Visit Diagnoses     Atrophic vaginitis    -  Primary    Relevant Medications    conjugated estrogens (PREMARIN) vaginal cream          Plan:   Return to clinic in 6 months or as needed.

## 2022-06-23 NOTE — ASSESSMENT & PLAN NOTE
Healthy lifestyle discussed. Mammogram up-to-date.  Cervical cancer screening up-to-date.  Colon cancer screening up-to-date.

## 2022-08-01 ENCOUNTER — OFFICE VISIT (OUTPATIENT)
Dept: ORTHOPEDICS | Facility: CLINIC | Age: 67
End: 2022-08-01
Payer: MEDICARE

## 2022-08-01 DIAGNOSIS — M17.0 PRIMARY OSTEOARTHRITIS OF BOTH KNEES: Primary | ICD-10-CM

## 2022-08-01 PROCEDURE — 1160F PR REVIEW ALL MEDS BY PRESCRIBER/CLIN PHARMACIST DOCUMENTED: ICD-10-PCS | Mod: CPTII,95,, | Performed by: NURSE PRACTITIONER

## 2022-08-01 PROCEDURE — 1160F RVW MEDS BY RX/DR IN RCRD: CPT | Mod: CPTII,95,, | Performed by: NURSE PRACTITIONER

## 2022-08-01 PROCEDURE — 1159F PR MEDICATION LIST DOCUMENTED IN MEDICAL RECORD: ICD-10-PCS | Mod: CPTII,95,, | Performed by: NURSE PRACTITIONER

## 2022-08-01 PROCEDURE — 99441 PR PHYSICIAN TELEPHONE EVALUATION 5-10 MIN: ICD-10-PCS | Mod: 95,,, | Performed by: NURSE PRACTITIONER

## 2022-08-01 PROCEDURE — 1159F MED LIST DOCD IN RCRD: CPT | Mod: CPTII,95,, | Performed by: NURSE PRACTITIONER

## 2022-08-01 PROCEDURE — 99441 PR PHYSICIAN TELEPHONE EVALUATION 5-10 MIN: CPT | Mod: 95,,, | Performed by: NURSE PRACTITIONER

## 2022-08-01 NOTE — PROGRESS NOTES
"Telemedicine Consent  The patient location is: Home  The chief complaint leading to consultation is: bilateral knee pain  Visit type: Audio only per patient request due to technical issues.   30 minutes of total time spent on the encounter, which includes face-to-face time and non-face-to-face time preparing to see the patient (eg. review of tests), obtaining and/or reviewing separately obtained history, documenting clinical information in the electronic or other health record, independently interpreting results (not separately reported) and communicating results to the patient/family/caregiver or care coordination (not separately reported).   I have reviewed patient's name,  and picture ID if applicable.  I discussed with patient regarding medical services by telemedicine (1) informed of the relationship between the physician and patient and the respective role of any other health care provider with respect to management of the patient (2) session are not recorded and personal health information is protected; and (3) notified that he or she may decline to receive medical services by telemedicine and may withdraw from such care at any time.  Patient consented to consult by telemedicine.   Subjective:       Follow up .  Patient ID: Rhoda Cesar is a 66 y.o. female. Non-smoker. Employment HX: house keeper, currently self employed.    Seen [facility] for same DX since 5/5/15.   Chief Complaint: No chief complaint on file.    HPI:    Bilateral L < R aching medial knee pain.   Injury: no known injury  Onset: several years ago worsening over time  Modifying Factors: worse with activity, improves with rest, stiffness after immobilization and improves with less than 30 minutes activity  Associated Symptoms: crepitus, decreased ROM, swelling, "giving out" and locking/ catching with ROM   Activity: sedentary with light activity, pain mildly interferes with ADLs  and use of assistive device walker PRN  Previous " "Treatments:  BMI reduction ongoing education, soft knee splint, HEP withTheraBand, RX NSAIDs, RX PT completed 12 wks/ 3 xs per week d/c'd 2/8/22, CSI since 2015 last injection 11/9/21, VS injections since 2020 last series 3/29/22 and HX orthoscope date @ 14 yo right knee for "bone locking muscle" by Dr. paredes  without symptom relief  PMH: + DM   Family History: + OA    Note: Patient has been getting conservative treatments since 2015 with adequate relief until recently.  Right knee with inadequate relief at this time and requesting surgical treatment options. Lives in [place], [place] and requesting referral to Dr. Dev Weaver.    Current Outpatient Medications:     acetaminophen (TYLENOL) 650 MG TbSR, Take 650 mg by mouth every 8 (eight) hours., Disp: , Rfl:     ALBUTEROL INHL, Inhale 2 puffs into the lungs daily as needed., Disp: , Rfl:     ascorbic acid, vitamin C, (VITAMIN C) 500 MG tablet, Take 500 mg by mouth once daily., Disp: , Rfl:     cetirizine (ZYRTEC) 10 MG tablet, TAKE ONE TABLET BY MOUTH EVERY DAY, Disp: 30 tablet, Rfl: 11    conjugated estrogens (PREMARIN) vaginal cream, Place 0.5 g vaginally 3 (three) times a week. Apply dime size to gloved finger and apply to vaginal walls 3x/week., Disp: 30 g, Rfl: 6    diclofenac (VOLTAREN) 75 MG EC tablet, TAKE ONE TABLET BY MOUTH TWICE A DAY, Disp: 60 tablet, Rfl: 6    empagliflozin (JARDIANCE) 10 mg tablet, Take 10 mg by mouth nightly., Disp: , Rfl:     ferrous sulfate (FEOSOL) Tab tablet, Take 1 tablet by mouth daily with breakfast., Disp: , Rfl:     gabapentin (NEURONTIN) 300 MG capsule, Take 2 capsules (600 mg total) by mouth 2 (two) times daily., Disp: 120 capsule, Rfl: 11    lisinopriL 10 MG tablet, Take 10 mg by mouth once daily., Disp: , Rfl:     multivitamin (THERAGRAN) per tablet, Take 1 tablet by mouth once daily., Disp: , Rfl:     pantoprazole (PROTONIX) 40 MG tablet, Take 40 mg by mouth once daily., Disp: , Rfl:     SYNTHROID 150 " mcg tablet, TAKE ONE TABLET BY MOUTH EVERY DAY, Disp: 30 tablet, Rfl: 11    vitamin E 200 UNIT capsule, Take 200 Units by mouth once daily., Disp: , Rfl:     zinc gluconate 50 mg tablet, Take 50 mg by mouth once daily., Disp: , Rfl:   Review of patient's allergies indicates:   Allergen Reactions    Sulfa (sulfonamide antibiotics) Anaphylaxis    Codeine      Hemoglobin A1c   Date Value Ref Range Status   06/18/2022 6.8 <=7.0 % Final   03/08/2022 6.6 <=7.0    06/14/2021 6.3 <<=7.0 % Final      There is no height or weight on file to calculate BMI.   There were no vitals filed for this visit.   Review of Systems:  A ten-point review of systems was performed and is negative, except as mentioned above   Objective:   N/a telemedicine visit   Assessment:       Imaging: X-ray 4 views of right and left knee dated 11/9/21 reviewed and independently interpreted by me.  Discussed with patient. Noted no acute, DJD noted.     Radiology Report 11/9/21  XR Knee Left 4 or More Views, XR Knee Right 4 or More Views  HISTORY: Bilateral knee pain  COMPARISON:   Radiographs 10/28/2019  FINDINGS:  No acute displaced fractures or dislocations.  Right patellofemoral and left medial compartment predominant  degenerative changes as manifested by joint space narrowing and early  osteophytosis.  Left quadriceps enthesopathy.  No blastic or lytic lesions.  Soft tissues within normal limits.  IMPRESSION:  No acute osseous abnormality.   No significant interval change.    Radiology Report 11/9/21  XR Knee Left 4 or More Views, XR Knee Right 4 or More Views  HISTORY: Bilateral knee pain  COMPARISON:   Radiographs 10/28/2019  FINDINGS:  No acute displaced fractures or dislocations.  Right patellofemoral and left medial compartment predominant  degenerative changes as manifested by joint space narrowing and early  osteophytosis.  Left quadriceps enthesopathy.  No blastic or lytic lesions.  Soft tissues within normal limits.  IMPRESSION:  No acute  osseous abnormality.   No significant interval change.    EMR Review: completed with noted prior visit 3/18/22 reviewed.    1. Primary osteoarthritis of both knees    2. BMI 28.0-28.9,adult      Plan:       1. Ongoing education about DX and treatment recommendations including conservative treatments of daily HEP with TheraBand, BMI reduction goal 5-10% of body weight (160# overall goal), muscle strengthening with use of stationary bike (RPM set at 80 or > with slow progression to goal of 40 minutes 3-4 times per week as tolerated), adequate vit D/C, glucosamine 1500 mg/day and daily acetaminophen 1000 mg 3 times/ day if able to tolerate.    2. Treatment plan: End stage knee arthritis bilateral knees R>L Referral for TKA eval. to  Orthopedics [phone] [facility] [place] [address] for Bilateral L < R knee DJD with failed conservative treatments including: RX NSAID, formal RX PT, CSI and HEP > 3 months.   Patient lives in [place], [place] and requesting referral to Dr. Dev Weaver office there.   3. Procedure: n/a  4. RX Medications: continue medications as RX per PCP  5. RTC: PRN if symptoms worsen or return    Pricila CARR    Timed Billing Note  Total Time Spent with Patient: 30 minutes  Visit Start Time: 1245  10 minutes spent prior to exam reviewing EMR, prior labs and x-rays.  10 minutes spent in exam with patient completing exam, obtaining history, educating on DX and treatment plan.  10 minutes spent after exam completing EMR documentation.   Visit End Time: 1315

## 2022-08-18 ENCOUNTER — OFFICE VISIT (OUTPATIENT)
Dept: ORTHOPEDICS | Facility: CLINIC | Age: 67
End: 2022-08-18
Payer: MEDICARE

## 2022-08-18 ENCOUNTER — HOSPITAL ENCOUNTER (OUTPATIENT)
Dept: RADIOLOGY | Facility: CLINIC | Age: 67
Discharge: HOME | End: 2022-08-18
Attending: ORTHOPAEDIC SURGERY
Payer: MEDICARE

## 2022-08-18 VITALS
WEIGHT: 181.63 LBS | HEIGHT: 67 IN | BODY MASS INDEX: 28.51 KG/M2 | DIASTOLIC BLOOD PRESSURE: 69 MMHG | HEART RATE: 67 BPM | SYSTOLIC BLOOD PRESSURE: 112 MMHG

## 2022-08-18 DIAGNOSIS — M17.0 PRIMARY OSTEOARTHRITIS OF BOTH KNEES: Primary | ICD-10-CM

## 2022-08-18 DIAGNOSIS — M25.461 KNEE EFFUSION, RIGHT: ICD-10-CM

## 2022-08-18 DIAGNOSIS — M17.0 PRIMARY OSTEOARTHRITIS OF BOTH KNEES: ICD-10-CM

## 2022-08-18 PROCEDURE — 20610 DRAIN/INJ JOINT/BURSA W/O US: CPT | Mod: 50,,, | Performed by: ORTHOPAEDIC SURGERY

## 2022-08-18 PROCEDURE — 3074F PR MOST RECENT SYSTOLIC BLOOD PRESSURE < 130 MM HG: ICD-10-PCS | Mod: CPTII,,, | Performed by: ORTHOPAEDIC SURGERY

## 2022-08-18 PROCEDURE — 1125F AMNT PAIN NOTED PAIN PRSNT: CPT | Mod: CPTII,,, | Performed by: ORTHOPAEDIC SURGERY

## 2022-08-18 PROCEDURE — 99204 PR OFFICE/OUTPT VISIT, NEW, LEVL IV, 45-59 MIN: ICD-10-PCS | Mod: 25,,, | Performed by: ORTHOPAEDIC SURGERY

## 2022-08-18 PROCEDURE — 3078F PR MOST RECENT DIASTOLIC BLOOD PRESSURE < 80 MM HG: ICD-10-PCS | Mod: CPTII,,, | Performed by: ORTHOPAEDIC SURGERY

## 2022-08-18 PROCEDURE — 3074F SYST BP LT 130 MM HG: CPT | Mod: CPTII,,, | Performed by: ORTHOPAEDIC SURGERY

## 2022-08-18 PROCEDURE — 4010F PR ACE/ARB THEARPY RXD/TAKEN: ICD-10-PCS | Mod: CPTII,,, | Performed by: ORTHOPAEDIC SURGERY

## 2022-08-18 PROCEDURE — 1125F PR PAIN SEVERITY QUANTIFIED, PAIN PRESENT: ICD-10-PCS | Mod: CPTII,,, | Performed by: ORTHOPAEDIC SURGERY

## 2022-08-18 PROCEDURE — 99204 OFFICE O/P NEW MOD 45 MIN: CPT | Mod: 25,,, | Performed by: ORTHOPAEDIC SURGERY

## 2022-08-18 PROCEDURE — 1159F MED LIST DOCD IN RCRD: CPT | Mod: CPTII,,, | Performed by: ORTHOPAEDIC SURGERY

## 2022-08-18 PROCEDURE — 1101F PR PT FALLS ASSESS DOC 0-1 FALLS W/OUT INJ PAST YR: ICD-10-PCS | Mod: CPTII,,, | Performed by: ORTHOPAEDIC SURGERY

## 2022-08-18 PROCEDURE — 1159F PR MEDICATION LIST DOCUMENTED IN MEDICAL RECORD: ICD-10-PCS | Mod: CPTII,,, | Performed by: ORTHOPAEDIC SURGERY

## 2022-08-18 PROCEDURE — 3288F PR FALLS RISK ASSESSMENT DOCUMENTED: ICD-10-PCS | Mod: CPTII,,, | Performed by: ORTHOPAEDIC SURGERY

## 2022-08-18 PROCEDURE — 3008F PR BODY MASS INDEX (BMI) DOCUMENTED: ICD-10-PCS | Mod: CPTII,,, | Performed by: ORTHOPAEDIC SURGERY

## 2022-08-18 PROCEDURE — 1101F PT FALLS ASSESS-DOCD LE1/YR: CPT | Mod: CPTII,,, | Performed by: ORTHOPAEDIC SURGERY

## 2022-08-18 PROCEDURE — 20610 LARGE JOINT ASPIRATION/INJECTION: R KNEE: ICD-10-PCS | Mod: 50,,, | Performed by: ORTHOPAEDIC SURGERY

## 2022-08-18 PROCEDURE — 1160F RVW MEDS BY RX/DR IN RCRD: CPT | Mod: CPTII,,, | Performed by: ORTHOPAEDIC SURGERY

## 2022-08-18 PROCEDURE — 4010F ACE/ARB THERAPY RXD/TAKEN: CPT | Mod: CPTII,,, | Performed by: ORTHOPAEDIC SURGERY

## 2022-08-18 PROCEDURE — 73564 X-RAY EXAM KNEE 4 OR MORE: CPT | Mod: 50,,, | Performed by: ORTHOPAEDIC SURGERY

## 2022-08-18 PROCEDURE — 3008F BODY MASS INDEX DOCD: CPT | Mod: CPTII,,, | Performed by: ORTHOPAEDIC SURGERY

## 2022-08-18 PROCEDURE — 73564 XR KNEE COMP 4 OR MORE VIEWS BILAT: ICD-10-PCS | Mod: 50,,, | Performed by: ORTHOPAEDIC SURGERY

## 2022-08-18 PROCEDURE — 3078F DIAST BP <80 MM HG: CPT | Mod: CPTII,,, | Performed by: ORTHOPAEDIC SURGERY

## 2022-08-18 PROCEDURE — 3288F FALL RISK ASSESSMENT DOCD: CPT | Mod: CPTII,,, | Performed by: ORTHOPAEDIC SURGERY

## 2022-08-18 PROCEDURE — 1160F PR REVIEW ALL MEDS BY PRESCRIBER/CLIN PHARMACIST DOCUMENTED: ICD-10-PCS | Mod: CPTII,,, | Performed by: ORTHOPAEDIC SURGERY

## 2022-08-18 RX ORDER — BETAMETHASONE SODIUM PHOSPHATE AND BETAMETHASONE ACETATE 3; 3 MG/ML; MG/ML
12 INJECTION, SUSPENSION INTRA-ARTICULAR; INTRALESIONAL; INTRAMUSCULAR; SOFT TISSUE
Status: DISCONTINUED | OUTPATIENT
Start: 2022-08-18 | End: 2022-08-18 | Stop reason: HOSPADM

## 2022-08-18 RX ORDER — LIDOCAINE HYDROCHLORIDE 20 MG/ML
3 INJECTION, SOLUTION INFILTRATION; PERINEURAL
Status: DISCONTINUED | OUTPATIENT
Start: 2022-08-18 | End: 2022-08-18 | Stop reason: HOSPADM

## 2022-08-18 RX ADMIN — LIDOCAINE HYDROCHLORIDE 3 MG: 20 INJECTION, SOLUTION INFILTRATION; PERINEURAL at 08:08

## 2022-08-18 RX ADMIN — BETAMETHASONE SODIUM PHOSPHATE AND BETAMETHASONE ACETATE 12 MG: 3; 3 INJECTION, SUSPENSION INTRA-ARTICULAR; INTRALESIONAL; INTRAMUSCULAR; SOFT TISSUE at 08:08

## 2022-08-18 NOTE — PROGRESS NOTES
Subjective:    CC: Knee Pain (JAKOB Knee pain- Pt states she have been experiencing this pain for about 2 years and in the last couple months it worsened causing a swelling in both knees and a pain described as a pressure which unable her to bend the knee. Pt states she had a set of 3 shots in May w/ Dr. Pricila Bryant but it did not help to relief.  )       HPI:  Patient comes complaining of bilateral knee pain right greater than left.  She has also has some swelling in her right knee, she has had previous injections without relief.  She continues to have pain with long standing walking bending not really the rest medication.    ROS: Refer to HPI for pertinent ROS. All other 12 point systems negative.    Objective:    Physical Exam:  The patient is well-nourished, well-developed, in no apparent distress, pleasant and cooperative. Examination of the bilateral lower extremities,  compartments are soft and warm. Skin is intact. There are no signs or symptoms of DVT or infection. There is and large joint effusion of the right knee, no effusion left knee. There is no erythema. Tenderness to palpation along the mediolateral joint line bilaterally, right knee range of motion is 5-95; left knee range of motion is 0-110. The knee is stable to stressing with varus and valgus. Negative anterior and posterior drawer.   Negative Lachman´s.  Negative Jalen's test. Patella grind is positive, negative for apprehension.  Patient is neurovascularly intact distally.      Images:  X-rays four views of the left and right knee demonstrates moderate arthritic changes. Images Reviewed and discussed with patient.    Assessment:  1. Primary osteoarthritis of both knees  - X-Ray Knee Complete 4 Or More Views Bilat; Future  - Ambulatory referral/consult to Orthopedics    2. Knee effusion, right  - Large Joint Aspiration/Injection: R knee  - betamethasone acetate-betamethasone sodium phosphate injection 12 mg  - LIDOcaine HCL 20 mg/ml (2%)  injection 3 mg        Plan:  At this time we discussed her physical exam and x-ray findings.  She tolerated aspiration very well, injection of her right knee.  60 cc of serosanguineous fluid was removed, she also received a steroid lidocaine injection to the right knee.  She will continue low-impact activities, I would like see back in 2 weeks to see how she is progressing.    Follow UP: No follow-ups on file.    Large Joint Aspiration/Injection: R knee    Date/Time: 8/18/2022 8:00 AM  Performed by: Dev Weaver MD  Authorized by: Dev Weaver MD     Consent Done?:  Yes (Verbal)  Indications:  Pain  Site marked: the procedure site was marked    Prep: patient was prepped and draped in usual sterile fashion    Approach:  Anterolateral  Location:  Knee  Site:  R knee  Medications:  12 mg betamethasone acetate-betamethasone sodium phosphate 6 mg/mL; 3 mg LIDOcaine HCL 20 mg/ml (2%) 20 mg/mL (2 %)  Patient tolerance:  Patient tolerated the procedure well with no immediate complications

## 2022-08-18 NOTE — PROCEDURES
Large Joint Aspiration/Injection: R knee    Date/Time: 8/18/2022 8:00 AM  Performed by: Dev Weaver MD  Authorized by: Dev Weaver MD     Consent Done?:  Yes (Verbal)  Indications:  Pain  Site marked: the procedure site was marked    Prep: patient was prepped and draped in usual sterile fashion    Approach:  Anterolateral  Location:  Knee  Site:  R knee  Medications:  12 mg betamethasone acetate-betamethasone sodium phosphate 6 mg/mL; 3 mg LIDOcaine HCL 20 mg/ml (2%) 20 mg/mL (2 %)  Patient tolerance:  Patient tolerated the procedure well with no immediate complications       Stelara Counseling:  I discussed with the patient the risks of ustekinumab including but not limited to immunosuppression, malignancy, posterior leukoencephalopathy syndrome, and serious infections.  The patient understands that monitoring is required including a PPD at baseline and must alert us or the primary physician if symptoms of infection or other concerning signs are noted.

## 2022-08-31 ENCOUNTER — OFFICE VISIT (OUTPATIENT)
Dept: ORTHOPEDICS | Facility: CLINIC | Age: 67
End: 2022-08-31
Payer: MEDICARE

## 2022-08-31 VITALS
SYSTOLIC BLOOD PRESSURE: 115 MMHG | TEMPERATURE: 98 F | HEIGHT: 67 IN | WEIGHT: 181 LBS | BODY MASS INDEX: 28.41 KG/M2 | DIASTOLIC BLOOD PRESSURE: 70 MMHG | HEART RATE: 70 BPM

## 2022-08-31 DIAGNOSIS — M17.0 PRIMARY OSTEOARTHRITIS OF BOTH KNEES: ICD-10-CM

## 2022-08-31 DIAGNOSIS — M25.461 KNEE EFFUSION, RIGHT: Primary | ICD-10-CM

## 2022-08-31 PROCEDURE — 1160F RVW MEDS BY RX/DR IN RCRD: CPT | Mod: CPTII,,, | Performed by: ORTHOPAEDIC SURGERY

## 2022-08-31 PROCEDURE — 3008F PR BODY MASS INDEX (BMI) DOCUMENTED: ICD-10-PCS | Mod: CPTII,,, | Performed by: ORTHOPAEDIC SURGERY

## 2022-08-31 PROCEDURE — 3074F PR MOST RECENT SYSTOLIC BLOOD PRESSURE < 130 MM HG: ICD-10-PCS | Mod: CPTII,,, | Performed by: ORTHOPAEDIC SURGERY

## 2022-08-31 PROCEDURE — 3288F FALL RISK ASSESSMENT DOCD: CPT | Mod: CPTII,,, | Performed by: ORTHOPAEDIC SURGERY

## 2022-08-31 PROCEDURE — 20610 DRAIN/INJ JOINT/BURSA W/O US: CPT | Mod: RT,,, | Performed by: ORTHOPAEDIC SURGERY

## 2022-08-31 PROCEDURE — 99213 PR OFFICE/OUTPT VISIT, EST, LEVL III, 20-29 MIN: ICD-10-PCS | Mod: 25,,, | Performed by: ORTHOPAEDIC SURGERY

## 2022-08-31 PROCEDURE — 1101F PR PT FALLS ASSESS DOC 0-1 FALLS W/OUT INJ PAST YR: ICD-10-PCS | Mod: CPTII,,, | Performed by: ORTHOPAEDIC SURGERY

## 2022-08-31 PROCEDURE — 3008F BODY MASS INDEX DOCD: CPT | Mod: CPTII,,, | Performed by: ORTHOPAEDIC SURGERY

## 2022-08-31 PROCEDURE — 1125F AMNT PAIN NOTED PAIN PRSNT: CPT | Mod: CPTII,,, | Performed by: ORTHOPAEDIC SURGERY

## 2022-08-31 PROCEDURE — 20610 LARGE JOINT ASPIRATION/INJECTION: R KNEE: ICD-10-PCS | Mod: RT,,, | Performed by: ORTHOPAEDIC SURGERY

## 2022-08-31 PROCEDURE — 3074F SYST BP LT 130 MM HG: CPT | Mod: CPTII,,, | Performed by: ORTHOPAEDIC SURGERY

## 2022-08-31 PROCEDURE — 4010F ACE/ARB THERAPY RXD/TAKEN: CPT | Mod: CPTII,,, | Performed by: ORTHOPAEDIC SURGERY

## 2022-08-31 PROCEDURE — 1159F PR MEDICATION LIST DOCUMENTED IN MEDICAL RECORD: ICD-10-PCS | Mod: CPTII,,, | Performed by: ORTHOPAEDIC SURGERY

## 2022-08-31 PROCEDURE — 3078F PR MOST RECENT DIASTOLIC BLOOD PRESSURE < 80 MM HG: ICD-10-PCS | Mod: CPTII,,, | Performed by: ORTHOPAEDIC SURGERY

## 2022-08-31 PROCEDURE — 1160F PR REVIEW ALL MEDS BY PRESCRIBER/CLIN PHARMACIST DOCUMENTED: ICD-10-PCS | Mod: CPTII,,, | Performed by: ORTHOPAEDIC SURGERY

## 2022-08-31 PROCEDURE — 99213 OFFICE O/P EST LOW 20 MIN: CPT | Mod: 25,,, | Performed by: ORTHOPAEDIC SURGERY

## 2022-08-31 PROCEDURE — 3078F DIAST BP <80 MM HG: CPT | Mod: CPTII,,, | Performed by: ORTHOPAEDIC SURGERY

## 2022-08-31 PROCEDURE — 1125F PR PAIN SEVERITY QUANTIFIED, PAIN PRESENT: ICD-10-PCS | Mod: CPTII,,, | Performed by: ORTHOPAEDIC SURGERY

## 2022-08-31 PROCEDURE — 1159F MED LIST DOCD IN RCRD: CPT | Mod: CPTII,,, | Performed by: ORTHOPAEDIC SURGERY

## 2022-08-31 PROCEDURE — 4010F PR ACE/ARB THEARPY RXD/TAKEN: ICD-10-PCS | Mod: CPTII,,, | Performed by: ORTHOPAEDIC SURGERY

## 2022-08-31 PROCEDURE — 3288F PR FALLS RISK ASSESSMENT DOCUMENTED: ICD-10-PCS | Mod: CPTII,,, | Performed by: ORTHOPAEDIC SURGERY

## 2022-08-31 PROCEDURE — 1101F PT FALLS ASSESS-DOCD LE1/YR: CPT | Mod: CPTII,,, | Performed by: ORTHOPAEDIC SURGERY

## 2022-08-31 RX ORDER — LIDOCAINE HYDROCHLORIDE 20 MG/ML
3 INJECTION, SOLUTION INFILTRATION; PERINEURAL
Status: DISCONTINUED | OUTPATIENT
Start: 2022-08-31 | End: 2022-08-31 | Stop reason: HOSPADM

## 2022-08-31 RX ORDER — BETAMETHASONE SODIUM PHOSPHATE AND BETAMETHASONE ACETATE 3; 3 MG/ML; MG/ML
12 INJECTION, SUSPENSION INTRA-ARTICULAR; INTRALESIONAL; INTRAMUSCULAR; SOFT TISSUE
Status: DISCONTINUED | OUTPATIENT
Start: 2022-08-31 | End: 2022-08-31 | Stop reason: HOSPADM

## 2022-08-31 RX ADMIN — BETAMETHASONE SODIUM PHOSPHATE AND BETAMETHASONE ACETATE 12 MG: 3; 3 INJECTION, SUSPENSION INTRA-ARTICULAR; INTRALESIONAL; INTRAMUSCULAR; SOFT TISSUE at 01:08

## 2022-08-31 RX ADMIN — LIDOCAINE HYDROCHLORIDE 3 MG: 20 INJECTION, SOLUTION INFILTRATION; PERINEURAL at 01:08

## 2022-08-31 NOTE — PROCEDURES
Large Joint Aspiration/Injection: R knee    Date/Time: 8/31/2022 1:15 PM  Performed by: Dev Weaver MD  Authorized by: Dev Weaver MD     Consent Done?:  Yes (Verbal)  Indications:  Pain  Site marked: the procedure site was marked    Prep: patient was prepped and draped in usual sterile fashion    Approach:  Anterolateral  Location:  Knee  Site:  R knee  Medications:  12 mg betamethasone acetate-betamethasone sodium phosphate 6 mg/mL; 3 mg LIDOcaine HCL 20 mg/ml (2%) 20 mg/mL (2 %)  Patient tolerance:  Patient tolerated the procedure well with no immediate complications

## 2022-09-01 RX ORDER — MELOXICAM 15 MG/1
15 TABLET ORAL DAILY
Qty: 30 TABLET | Refills: 0 | Status: SHIPPED | OUTPATIENT
Start: 2022-09-01 | End: 2022-09-30 | Stop reason: SDUPTHER

## 2022-09-14 ENCOUNTER — OFFICE VISIT (OUTPATIENT)
Dept: ORTHOPEDICS | Facility: CLINIC | Age: 67
End: 2022-09-14
Payer: MEDICARE

## 2022-09-14 ENCOUNTER — HOSPITAL ENCOUNTER (OUTPATIENT)
Dept: RADIOLOGY | Facility: HOSPITAL | Age: 67
Discharge: HOME | End: 2022-09-14
Attending: ORTHOPAEDIC SURGERY
Payer: MEDICARE

## 2022-09-14 VITALS — HEIGHT: 67 IN | WEIGHT: 181 LBS | BODY MASS INDEX: 28.41 KG/M2

## 2022-09-14 DIAGNOSIS — M79.662 PAIN OF LEFT CALF: ICD-10-CM

## 2022-09-14 DIAGNOSIS — M17.0 PRIMARY OSTEOARTHRITIS OF BOTH KNEES: ICD-10-CM

## 2022-09-14 DIAGNOSIS — M25.562 LEFT KNEE PAIN, UNSPECIFIED CHRONICITY: Primary | ICD-10-CM

## 2022-09-14 PROCEDURE — 4010F PR ACE/ARB THEARPY RXD/TAKEN: ICD-10-PCS | Mod: CPTII,,, | Performed by: ORTHOPAEDIC SURGERY

## 2022-09-14 PROCEDURE — 4010F ACE/ARB THERAPY RXD/TAKEN: CPT | Mod: CPTII,,, | Performed by: ORTHOPAEDIC SURGERY

## 2022-09-14 PROCEDURE — 1159F MED LIST DOCD IN RCRD: CPT | Mod: CPTII,,, | Performed by: ORTHOPAEDIC SURGERY

## 2022-09-14 PROCEDURE — 1160F RVW MEDS BY RX/DR IN RCRD: CPT | Mod: CPTII,,, | Performed by: ORTHOPAEDIC SURGERY

## 2022-09-14 PROCEDURE — 1101F PR PT FALLS ASSESS DOC 0-1 FALLS W/OUT INJ PAST YR: ICD-10-PCS | Mod: CPTII,,, | Performed by: ORTHOPAEDIC SURGERY

## 2022-09-14 PROCEDURE — 99213 OFFICE O/P EST LOW 20 MIN: CPT | Mod: 25,,, | Performed by: ORTHOPAEDIC SURGERY

## 2022-09-14 PROCEDURE — 3288F PR FALLS RISK ASSESSMENT DOCUMENTED: ICD-10-PCS | Mod: CPTII,,, | Performed by: ORTHOPAEDIC SURGERY

## 2022-09-14 PROCEDURE — 3288F FALL RISK ASSESSMENT DOCD: CPT | Mod: CPTII,,, | Performed by: ORTHOPAEDIC SURGERY

## 2022-09-14 PROCEDURE — 20610 DRAIN/INJ JOINT/BURSA W/O US: CPT | Mod: LT,,, | Performed by: ORTHOPAEDIC SURGERY

## 2022-09-14 PROCEDURE — 3008F PR BODY MASS INDEX (BMI) DOCUMENTED: ICD-10-PCS | Mod: CPTII,,, | Performed by: ORTHOPAEDIC SURGERY

## 2022-09-14 PROCEDURE — 1159F PR MEDICATION LIST DOCUMENTED IN MEDICAL RECORD: ICD-10-PCS | Mod: CPTII,,, | Performed by: ORTHOPAEDIC SURGERY

## 2022-09-14 PROCEDURE — 20610 LARGE JOINT ASPIRATION/INJECTION: L KNEE: ICD-10-PCS | Mod: LT,,, | Performed by: ORTHOPAEDIC SURGERY

## 2022-09-14 PROCEDURE — 1101F PT FALLS ASSESS-DOCD LE1/YR: CPT | Mod: CPTII,,, | Performed by: ORTHOPAEDIC SURGERY

## 2022-09-14 PROCEDURE — 99213 PR OFFICE/OUTPT VISIT, EST, LEVL III, 20-29 MIN: ICD-10-PCS | Mod: 25,,, | Performed by: ORTHOPAEDIC SURGERY

## 2022-09-14 PROCEDURE — 3008F BODY MASS INDEX DOCD: CPT | Mod: CPTII,,, | Performed by: ORTHOPAEDIC SURGERY

## 2022-09-14 PROCEDURE — 1160F PR REVIEW ALL MEDS BY PRESCRIBER/CLIN PHARMACIST DOCUMENTED: ICD-10-PCS | Mod: CPTII,,, | Performed by: ORTHOPAEDIC SURGERY

## 2022-09-14 PROCEDURE — 93971 EXTREMITY STUDY: CPT | Mod: TC,LT

## 2022-09-14 RX ORDER — BETAMETHASONE SODIUM PHOSPHATE AND BETAMETHASONE ACETATE 3; 3 MG/ML; MG/ML
12 INJECTION, SUSPENSION INTRA-ARTICULAR; INTRALESIONAL; INTRAMUSCULAR; SOFT TISSUE
Status: DISCONTINUED | OUTPATIENT
Start: 2022-09-14 | End: 2022-09-14 | Stop reason: HOSPADM

## 2022-09-14 RX ORDER — LIDOCAINE HYDROCHLORIDE 20 MG/ML
3 INJECTION, SOLUTION INFILTRATION; PERINEURAL
Status: DISCONTINUED | OUTPATIENT
Start: 2022-09-14 | End: 2022-09-14 | Stop reason: HOSPADM

## 2022-09-14 RX ADMIN — BETAMETHASONE SODIUM PHOSPHATE AND BETAMETHASONE ACETATE 12 MG: 3; 3 INJECTION, SUSPENSION INTRA-ARTICULAR; INTRALESIONAL; INTRAMUSCULAR; SOFT TISSUE at 02:09

## 2022-09-14 RX ADMIN — LIDOCAINE HYDROCHLORIDE 3 MG: 20 INJECTION, SOLUTION INFILTRATION; PERINEURAL at 02:09

## 2022-09-14 NOTE — PROCEDURES
Large Joint Aspiration/Injection: L knee    Date/Time: 9/14/2022 2:00 PM  Performed by: Dev Weaver MD  Authorized by: Dev Weaver MD     Consent Done?:  Yes (Verbal)  Indications:  Pain  Site marked: the procedure site was marked    Prep: patient was prepped and draped in usual sterile fashion    Approach:  Anterolateral  Location:  Knee  Site:  L knee  Medications:  12 mg betamethasone acetate-betamethasone sodium phosphate 6 mg/mL; 3 mg LIDOcaine HCL 20 mg/ml (2%) 20 mg/mL (2 %)  Patient tolerance:  Patient tolerated the procedure well with no immediate complications

## 2022-09-14 NOTE — PROGRESS NOTES
Subjective:    CC: Pain of the Right Knee, Pain and Swelling of the Left Knee, and Follow-up (R knee asp and inj 8/31/22 - pt states that her knee is doing better., but her knee is swollen -  she states that her L knee is hurting and has swelling in the back of her knee - td)       HPI:  Patient returns today for repeat exam.  Patient continues to have pain about her left knee.  She did well with her right knee injection.  She would like to try a left knee injection as well.  She has also noticed some pain and swelling in her calf area, she states has gone on for last couple weeks not improved.    ROS: Refer to HPI for pertinent ROS. All other 12 point systems negative.    Objective:    Physical Exam:  The patient is well-nourished, well-developed and in no apparent distress, pleasant and cooperative. Examination of the left lower extremity compartments are soft and warm. Skin is intact. There are no signs or symptoms of infection, though she has calf pain, swelling. There is no obvious joint effusion. There is no erythema. Tender to palpation along the mediolateral joint line , left knee range of motion is 5-100. The knee is stable to exam with varus and valgus stressing. Negative anterior and posterior drawer. Negative Lachman´s.  Negative Jalen's test. Patella grind is positive, Negative for apprehension. Neurovascularly intact distally.    Images:  X-rays three views left knee demonstrates arthritic changes medial and patellofemoral compartments. Images Reviewed and discussed with patient.    Assessment:  She is progressing.  1. Left knee pain, unspecified chronicity  - X-Ray Knee 3 View Left; Future    2. Primary osteoarthritis of both knees    3. Pain of left calf        Plan:  At this time we discussed her physical exam and x-ray findings.  She tolerated injection very well the left knee, we will also proceed with an ultrasound rule out DVT to her left leg, like see her back otherwise in 6 weeks to see how  she is progressing.    Follow UP: No follow-ups on file.

## 2022-09-19 ENCOUNTER — LAB VISIT (OUTPATIENT)
Dept: LAB | Facility: HOSPITAL | Age: 67
End: 2022-09-19
Attending: NURSE PRACTITIONER
Payer: MEDICARE

## 2022-09-19 DIAGNOSIS — E78.5 HYPERLIPIDEMIA, UNSPECIFIED HYPERLIPIDEMIA TYPE: ICD-10-CM

## 2022-09-19 DIAGNOSIS — Z00.00 ROUTINE GENERAL MEDICAL EXAMINATION AT A HEALTH CARE FACILITY: Primary | ICD-10-CM

## 2022-09-19 DIAGNOSIS — E78.5 HYPERLIPIDEMIA, UNSPECIFIED HYPERLIPIDEMIA TYPE: Primary | ICD-10-CM

## 2022-09-19 LAB
CHOLEST SERPL-MCNC: 257 MG/DL
CHOLEST/HDLC SERPL: 5 {RATIO} (ref 0–5)
HDLC SERPL-MCNC: 53 MG/DL (ref 35–60)
LDLC SERPL CALC-MCNC: 161 MG/DL (ref 50–140)
TRIGL SERPL-MCNC: 217 MG/DL (ref 37–140)
VLDLC SERPL CALC-MCNC: 43 MG/DL

## 2022-09-19 PROCEDURE — 36415 COLL VENOUS BLD VENIPUNCTURE: CPT

## 2022-09-19 PROCEDURE — 80061 LIPID PANEL: CPT

## 2022-09-20 ENCOUNTER — OFFICE VISIT (OUTPATIENT)
Dept: FAMILY MEDICINE | Facility: CLINIC | Age: 67
End: 2022-09-20
Payer: MEDICARE

## 2022-09-20 VITALS
RESPIRATION RATE: 18 BRPM | DIASTOLIC BLOOD PRESSURE: 64 MMHG | BODY MASS INDEX: 28.99 KG/M2 | TEMPERATURE: 98 F | WEIGHT: 184.69 LBS | HEART RATE: 67 BPM | OXYGEN SATURATION: 95 % | SYSTOLIC BLOOD PRESSURE: 110 MMHG | HEIGHT: 67 IN

## 2022-09-20 DIAGNOSIS — E78.5 HYPERLIPIDEMIA, UNSPECIFIED HYPERLIPIDEMIA TYPE: ICD-10-CM

## 2022-09-20 DIAGNOSIS — E11.9 TYPE 2 DIABETES MELLITUS WITHOUT COMPLICATION, UNSPECIFIED WHETHER LONG TERM INSULIN USE: Primary | ICD-10-CM

## 2022-09-20 DIAGNOSIS — I10 HYPERTENSION, UNSPECIFIED TYPE: ICD-10-CM

## 2022-09-20 PROCEDURE — 99213 PR OFFICE/OUTPT VISIT, EST, LEVL III, 20-29 MIN: ICD-10-PCS | Mod: ,,, | Performed by: NURSE PRACTITIONER

## 2022-09-20 PROCEDURE — 1126F AMNT PAIN NOTED NONE PRSNT: CPT | Mod: CPTII,,, | Performed by: NURSE PRACTITIONER

## 2022-09-20 PROCEDURE — 4010F PR ACE/ARB THEARPY RXD/TAKEN: ICD-10-PCS | Mod: CPTII,,, | Performed by: NURSE PRACTITIONER

## 2022-09-20 PROCEDURE — 3074F PR MOST RECENT SYSTOLIC BLOOD PRESSURE < 130 MM HG: ICD-10-PCS | Mod: CPTII,,, | Performed by: NURSE PRACTITIONER

## 2022-09-20 PROCEDURE — 99213 OFFICE O/P EST LOW 20 MIN: CPT | Mod: ,,, | Performed by: NURSE PRACTITIONER

## 2022-09-20 PROCEDURE — 1159F MED LIST DOCD IN RCRD: CPT | Mod: CPTII,,, | Performed by: NURSE PRACTITIONER

## 2022-09-20 PROCEDURE — 1101F PR PT FALLS ASSESS DOC 0-1 FALLS W/OUT INJ PAST YR: ICD-10-PCS | Mod: CPTII,,, | Performed by: NURSE PRACTITIONER

## 2022-09-20 PROCEDURE — 1160F RVW MEDS BY RX/DR IN RCRD: CPT | Mod: CPTII,,, | Performed by: NURSE PRACTITIONER

## 2022-09-20 PROCEDURE — 3074F SYST BP LT 130 MM HG: CPT | Mod: CPTII,,, | Performed by: NURSE PRACTITIONER

## 2022-09-20 PROCEDURE — 3078F DIAST BP <80 MM HG: CPT | Mod: CPTII,,, | Performed by: NURSE PRACTITIONER

## 2022-09-20 PROCEDURE — 1126F PR PAIN SEVERITY QUANTIFIED, NO PAIN PRESENT: ICD-10-PCS | Mod: CPTII,,, | Performed by: NURSE PRACTITIONER

## 2022-09-20 PROCEDURE — 3078F PR MOST RECENT DIASTOLIC BLOOD PRESSURE < 80 MM HG: ICD-10-PCS | Mod: CPTII,,, | Performed by: NURSE PRACTITIONER

## 2022-09-20 PROCEDURE — 3288F PR FALLS RISK ASSESSMENT DOCUMENTED: ICD-10-PCS | Mod: CPTII,,, | Performed by: NURSE PRACTITIONER

## 2022-09-20 PROCEDURE — 4010F ACE/ARB THERAPY RXD/TAKEN: CPT | Mod: CPTII,,, | Performed by: NURSE PRACTITIONER

## 2022-09-20 PROCEDURE — 3008F PR BODY MASS INDEX (BMI) DOCUMENTED: ICD-10-PCS | Mod: CPTII,,, | Performed by: NURSE PRACTITIONER

## 2022-09-20 PROCEDURE — 1160F PR REVIEW ALL MEDS BY PRESCRIBER/CLIN PHARMACIST DOCUMENTED: ICD-10-PCS | Mod: CPTII,,, | Performed by: NURSE PRACTITIONER

## 2022-09-20 PROCEDURE — 3008F BODY MASS INDEX DOCD: CPT | Mod: CPTII,,, | Performed by: NURSE PRACTITIONER

## 2022-09-20 PROCEDURE — 1159F PR MEDICATION LIST DOCUMENTED IN MEDICAL RECORD: ICD-10-PCS | Mod: CPTII,,, | Performed by: NURSE PRACTITIONER

## 2022-09-20 PROCEDURE — 1101F PT FALLS ASSESS-DOCD LE1/YR: CPT | Mod: CPTII,,, | Performed by: NURSE PRACTITIONER

## 2022-09-20 PROCEDURE — 3288F FALL RISK ASSESSMENT DOCD: CPT | Mod: CPTII,,, | Performed by: NURSE PRACTITIONER

## 2022-09-20 RX ORDER — ROSUVASTATIN CALCIUM 10 MG/1
10 TABLET, COATED ORAL DAILY
Qty: 30 TABLET | Refills: 11 | Status: SHIPPED | OUTPATIENT
Start: 2022-09-20 | End: 2023-12-11 | Stop reason: SDUPTHER

## 2022-09-20 RX ORDER — PANTOPRAZOLE SODIUM 40 MG/1
40 TABLET, DELAYED RELEASE ORAL DAILY
Qty: 30 TABLET | Refills: 11 | Status: SHIPPED | OUTPATIENT
Start: 2022-09-20 | End: 2023-02-23

## 2022-09-20 NOTE — PROGRESS NOTES
Subjective:       Patient ID: Rhoda eCsar is a 66 y.o. female.    ----------------------------  Hypertension  Thyroid disease     Chief Complaint: No chief complaint on file.    This is a 66-year-old female presents to clinic for three-month follow-up appointment.    Review of Systems   Constitutional: Negative.    HENT: Negative.     Eyes: Negative.    Respiratory: Negative.     Cardiovascular: Negative.    Gastrointestinal: Negative.    Endocrine: Negative.    Genitourinary: Negative.    Musculoskeletal: Negative.    Integumentary:  Negative.   Allergic/Immunologic: Negative.    Neurological: Negative.    Hematological: Negative.    Psychiatric/Behavioral: Negative.           Objective:      Physical Exam  Constitutional:       Appearance: Normal appearance. She is obese.   HENT:      Head: Normocephalic.      Right Ear: Tympanic membrane, ear canal and external ear normal.      Left Ear: Tympanic membrane, ear canal and external ear normal.      Nose: Nose normal.      Mouth/Throat:      Mouth: Mucous membranes are moist.      Pharynx: Oropharynx is clear.   Eyes:      Extraocular Movements: Extraocular movements intact.      Conjunctiva/sclera: Conjunctivae normal.      Pupils: Pupils are equal, round, and reactive to light.   Cardiovascular:      Rate and Rhythm: Normal rate and regular rhythm.      Pulses: Normal pulses.      Heart sounds: Normal heart sounds.   Pulmonary:      Effort: Pulmonary effort is normal.      Breath sounds: Normal breath sounds.   Abdominal:      General: Bowel sounds are normal.      Palpations: Abdomen is soft.   Musculoskeletal:         General: Normal range of motion.      Cervical back: Normal range of motion and neck supple.   Skin:     General: Skin is warm and dry.   Neurological:      General: No focal deficit present.      Mental Status: She is alert and oriented to person, place, and time. Mental status is at baseline.   Psychiatric:         Mood and Affect: Mood normal.          Behavior: Behavior normal.         Thought Content: Thought content normal.         Judgment: Judgment normal.         Assessment & Plan:   1. Type 2 diabetes mellitus without complication, unspecified whether long term insulin use  -     Hemoglobin A1C    2. Hyperlipidemia, unspecified hyperlipidemia type  Assessment & Plan:  Rosuvastatin 10 mg p.o. at bedtime sent to pharmacy.  Low-fat, low-cholesterol diet advised.  Increase physical activity to at least 30 minutes today on most days of the week as tolerated.  Repeat lipid panel in 3 months.    Orders:  -     Lipid Panel    3. Hypertension, unspecified type    Other orders  -     pantoprazole (PROTONIX) 40 MG tablet  -     rosuvastatin (CRESTOR) 10 MG tablet    Return to clinic in 3 months for follow-up appointment.  Lipid panel and hemoglobin A1c to be completed prior to visit.

## 2022-09-20 NOTE — ASSESSMENT & PLAN NOTE
Rosuvastatin 10 mg p.o. at bedtime sent to pharmacy.  Low-fat, low-cholesterol diet advised.  Increase physical activity to at least 30 minutes today on most days of the week as tolerated.  Repeat lipid panel in 3 months.

## 2022-09-26 ENCOUNTER — OFFICE VISIT (OUTPATIENT)
Dept: GYNECOLOGY | Facility: CLINIC | Age: 67
End: 2022-09-26
Payer: MEDICARE

## 2022-09-26 VITALS
DIASTOLIC BLOOD PRESSURE: 81 MMHG | TEMPERATURE: 98 F | BODY MASS INDEX: 28.88 KG/M2 | OXYGEN SATURATION: 100 % | RESPIRATION RATE: 18 BRPM | WEIGHT: 184 LBS | SYSTOLIC BLOOD PRESSURE: 149 MMHG | HEIGHT: 67 IN | HEART RATE: 65 BPM

## 2022-09-26 DIAGNOSIS — N95.2 ATROPHIC VAGINITIS: ICD-10-CM

## 2022-09-26 DIAGNOSIS — Z12.31 VISIT FOR SCREENING MAMMOGRAM: ICD-10-CM

## 2022-09-26 DIAGNOSIS — Z01.419 ENCOUNTER FOR ANNUAL ROUTINE GYNECOLOGICAL EXAMINATION: Primary | ICD-10-CM

## 2022-09-26 PROBLEM — Z00.00 WELLNESS EXAMINATION: Status: RESOLVED | Noted: 2022-06-23 | Resolved: 2022-09-26

## 2022-09-26 PROCEDURE — 4010F ACE/ARB THERAPY RXD/TAKEN: CPT | Mod: CPTII,,, | Performed by: NURSE PRACTITIONER

## 2022-09-26 PROCEDURE — 4010F PR ACE/ARB THEARPY RXD/TAKEN: ICD-10-PCS | Mod: CPTII,,, | Performed by: NURSE PRACTITIONER

## 2022-09-26 PROCEDURE — 3008F PR BODY MASS INDEX (BMI) DOCUMENTED: ICD-10-PCS | Mod: CPTII,,, | Performed by: NURSE PRACTITIONER

## 2022-09-26 PROCEDURE — 3008F BODY MASS INDEX DOCD: CPT | Mod: CPTII,,, | Performed by: NURSE PRACTITIONER

## 2022-09-26 PROCEDURE — 99215 OFFICE O/P EST HI 40 MIN: CPT | Mod: PBBFAC | Performed by: NURSE PRACTITIONER

## 2022-09-26 PROCEDURE — 1159F MED LIST DOCD IN RCRD: CPT | Mod: CPTII,,, | Performed by: NURSE PRACTITIONER

## 2022-09-26 PROCEDURE — 1159F PR MEDICATION LIST DOCUMENTED IN MEDICAL RECORD: ICD-10-PCS | Mod: CPTII,,, | Performed by: NURSE PRACTITIONER

## 2022-09-26 PROCEDURE — 3288F PR FALLS RISK ASSESSMENT DOCUMENTED: ICD-10-PCS | Mod: CPTII,,, | Performed by: NURSE PRACTITIONER

## 2022-09-26 PROCEDURE — 3288F FALL RISK ASSESSMENT DOCD: CPT | Mod: CPTII,,, | Performed by: NURSE PRACTITIONER

## 2022-09-26 PROCEDURE — 1101F PT FALLS ASSESS-DOCD LE1/YR: CPT | Mod: CPTII,,, | Performed by: NURSE PRACTITIONER

## 2022-09-26 PROCEDURE — 3079F PR MOST RECENT DIASTOLIC BLOOD PRESSURE 80-89 MM HG: ICD-10-PCS | Mod: CPTII,,, | Performed by: NURSE PRACTITIONER

## 2022-09-26 PROCEDURE — 3079F DIAST BP 80-89 MM HG: CPT | Mod: CPTII,,, | Performed by: NURSE PRACTITIONER

## 2022-09-26 PROCEDURE — 99397 PER PM REEVAL EST PAT 65+ YR: CPT | Mod: S$PBB,,, | Performed by: NURSE PRACTITIONER

## 2022-09-26 PROCEDURE — 3077F PR MOST RECENT SYSTOLIC BLOOD PRESSURE >= 140 MM HG: ICD-10-PCS | Mod: CPTII,,, | Performed by: NURSE PRACTITIONER

## 2022-09-26 PROCEDURE — 3077F SYST BP >= 140 MM HG: CPT | Mod: CPTII,,, | Performed by: NURSE PRACTITIONER

## 2022-09-26 PROCEDURE — 99397 PR PREVENTIVE VISIT,EST,65 & OVER: ICD-10-PCS | Mod: S$PBB,,, | Performed by: NURSE PRACTITIONER

## 2022-09-26 PROCEDURE — 1101F PR PT FALLS ASSESS DOC 0-1 FALLS W/OUT INJ PAST YR: ICD-10-PCS | Mod: CPTII,,, | Performed by: NURSE PRACTITIONER

## 2022-09-26 RX ORDER — CHLORHEXIDINE GLUCONATE ORAL RINSE 1.2 MG/ML
SOLUTION DENTAL
Status: ON HOLD | COMMUNITY
Start: 2022-09-03 | End: 2023-01-05 | Stop reason: HOSPADM

## 2022-09-26 RX ORDER — BLOOD SUGAR DIAGNOSTIC
STRIP MISCELLANEOUS
COMMUNITY
Start: 2022-09-22 | End: 2022-12-27

## 2022-09-26 RX ORDER — LANCETS 33 GAUGE
EACH MISCELLANEOUS
COMMUNITY
Start: 2022-08-24 | End: 2023-02-14

## 2022-09-26 NOTE — PROGRESS NOTES
"  Subjective:       Patient ID: Rhoda Cesar is a 66 y.o. female.    Chief Complaint:  Well Woman    History of Present Illness  Pt is  here for annual gyn exam. Denies hx of abnormal pap. Pap 2020 and - NIL and HPV neg. Pt is postmenopausal. Denies vaginal bleeding or discharge. Pt with hx of atrophic vaginitis. Denies discharge. Diagnosed with Lichen sclerous through biopsy with GYN in . No longer using Clobetasol and denies any vaginal itching/irritation. Pt takes otc multivitamin with calcium and vit D. DEXA WNL on 20. MG-10/28/21-BIRADS 1. Colonoscopy in 2020 with f/u in 10 years. She is followed by clinic at [facility] for primary care.    GYN & OB History  No LMP recorded. Patient is postmenopausal.       Review of patient's allergies indicates:   Allergen Reactions    Sulfa (sulfonamide antibiotics) Anaphylaxis    Codeine      Past Medical History:   Diagnosis Date    Diabetes mellitus     Hypertension     Thyroid disease      OB History    Para Term  AB Living   2 2 2         SAB IAB Ectopic Multiple Live Births                  # Outcome Date GA Lbr Rohit/2nd Weight Sex Delivery Anes PTL Lv   2 Term     M Vag-Spont      1 Term     F Vag-Spont           Review of Systems  Review of Systems    Negative except for pertinent findings for positives per HPI     Objective:    Physical Exam    BP (!) 149/81 (BP Location: Left arm, Patient Position: Sitting, BP Method: Medium (Automatic))   Pulse 65   Temp 97.5 °F (36.4 °C)   Resp 18   Ht 5' 7" (1.702 m)   Wt 83.5 kg (184 lb)   SpO2 100%   BMI 28.82 kg/m²   GENERAL: Well-developed female in no acute distress.  SKIN: Normal to inspection, warm and intact.  BREASTS: No masses, lumps, discharge, tenderness.  VULVA: General appearance with hypopigmented skin down to anus, agglutination of bo labia minor, no erythema, no fissures/lesions.  VAGINA: Mucosa atrophic, no discharge or lesions.  CERVIX: Atrophic, no erythema or " discharge.  BIMANUAL EXAM: reveals a 10 week-sized uterus. The uterus is non tender. Jameson adnexa reveal no evidence of masses, tenderness.  PSYCHIATRIC: Patient is oriented to person, place, and time. Mood and affect are normal.    Assessment:       1. Encounter for annual routine gynecological examination    2. Visit for screening mammogram    3. Atrophic vaginitis         Plan:   Iris was seen today for well woman.    Diagnoses and all orders for this visit:    Encounter for annual routine gynecological examination    Visit for screening mammogram    Atrophic vaginitis    Other orders  -     conjugated estrogens (PREMARIN) vaginal cream; Place 0.5 g vaginally twice a week. Apply small amount to gloved finger and apply to vaginal walls.  Pelvic exam, pap utd  MMG ordered  Premain cream 2x/week.  F/u with GYN for lichen sclerosus as scheduled in 11/2022.

## 2022-09-30 DIAGNOSIS — M17.0 PRIMARY OSTEOARTHRITIS OF BOTH KNEES: ICD-10-CM

## 2022-09-30 RX ORDER — MELOXICAM 15 MG/1
15 TABLET ORAL DAILY
Qty: 30 TABLET | Refills: 11 | Status: ON HOLD | OUTPATIENT
Start: 2022-09-30 | End: 2023-01-05 | Stop reason: HOSPADM

## 2022-10-17 DIAGNOSIS — Z12.31 ENCOUNTER FOR SCREENING MAMMOGRAM FOR BREAST CANCER: Primary | ICD-10-CM

## 2022-10-17 RX ORDER — INSULIN PUMP SYRINGE, 3 ML
EACH MISCELLANEOUS
Qty: 1 EACH | Refills: 0 | Status: SHIPPED | OUTPATIENT
Start: 2022-10-17 | End: 2022-11-14

## 2022-10-26 ENCOUNTER — OFFICE VISIT (OUTPATIENT)
Dept: ORTHOPEDICS | Facility: CLINIC | Age: 67
End: 2022-10-26
Payer: MEDICARE

## 2022-10-26 VITALS
DIASTOLIC BLOOD PRESSURE: 80 MMHG | HEART RATE: 80 BPM | SYSTOLIC BLOOD PRESSURE: 117 MMHG | HEIGHT: 67 IN | WEIGHT: 180 LBS | TEMPERATURE: 98 F | BODY MASS INDEX: 28.25 KG/M2

## 2022-10-26 DIAGNOSIS — M17.0 PRIMARY OSTEOARTHRITIS OF BOTH KNEES: Primary | ICD-10-CM

## 2022-10-26 PROCEDURE — 99213 OFFICE O/P EST LOW 20 MIN: CPT | Mod: ,,, | Performed by: ORTHOPAEDIC SURGERY

## 2022-10-26 PROCEDURE — 1160F PR REVIEW ALL MEDS BY PRESCRIBER/CLIN PHARMACIST DOCUMENTED: ICD-10-PCS | Mod: CPTII,,, | Performed by: ORTHOPAEDIC SURGERY

## 2022-10-26 PROCEDURE — 3288F PR FALLS RISK ASSESSMENT DOCUMENTED: ICD-10-PCS | Mod: CPTII,,, | Performed by: ORTHOPAEDIC SURGERY

## 2022-10-26 PROCEDURE — 1101F PT FALLS ASSESS-DOCD LE1/YR: CPT | Mod: CPTII,,, | Performed by: ORTHOPAEDIC SURGERY

## 2022-10-26 PROCEDURE — 1160F RVW MEDS BY RX/DR IN RCRD: CPT | Mod: CPTII,,, | Performed by: ORTHOPAEDIC SURGERY

## 2022-10-26 PROCEDURE — 1159F MED LIST DOCD IN RCRD: CPT | Mod: CPTII,,, | Performed by: ORTHOPAEDIC SURGERY

## 2022-10-26 PROCEDURE — 3288F FALL RISK ASSESSMENT DOCD: CPT | Mod: CPTII,,, | Performed by: ORTHOPAEDIC SURGERY

## 2022-10-26 PROCEDURE — 1125F PR PAIN SEVERITY QUANTIFIED, PAIN PRESENT: ICD-10-PCS | Mod: CPTII,,, | Performed by: ORTHOPAEDIC SURGERY

## 2022-10-26 PROCEDURE — 3079F DIAST BP 80-89 MM HG: CPT | Mod: CPTII,,, | Performed by: ORTHOPAEDIC SURGERY

## 2022-10-26 PROCEDURE — 99213 PR OFFICE/OUTPT VISIT, EST, LEVL III, 20-29 MIN: ICD-10-PCS | Mod: ,,, | Performed by: ORTHOPAEDIC SURGERY

## 2022-10-26 PROCEDURE — 1101F PR PT FALLS ASSESS DOC 0-1 FALLS W/OUT INJ PAST YR: ICD-10-PCS | Mod: CPTII,,, | Performed by: ORTHOPAEDIC SURGERY

## 2022-10-26 PROCEDURE — 3074F PR MOST RECENT SYSTOLIC BLOOD PRESSURE < 130 MM HG: ICD-10-PCS | Mod: CPTII,,, | Performed by: ORTHOPAEDIC SURGERY

## 2022-10-26 PROCEDURE — 3079F PR MOST RECENT DIASTOLIC BLOOD PRESSURE 80-89 MM HG: ICD-10-PCS | Mod: CPTII,,, | Performed by: ORTHOPAEDIC SURGERY

## 2022-10-26 PROCEDURE — 1159F PR MEDICATION LIST DOCUMENTED IN MEDICAL RECORD: ICD-10-PCS | Mod: CPTII,,, | Performed by: ORTHOPAEDIC SURGERY

## 2022-10-26 PROCEDURE — 4010F PR ACE/ARB THEARPY RXD/TAKEN: ICD-10-PCS | Mod: CPTII,,, | Performed by: ORTHOPAEDIC SURGERY

## 2022-10-26 PROCEDURE — 4010F ACE/ARB THERAPY RXD/TAKEN: CPT | Mod: CPTII,,, | Performed by: ORTHOPAEDIC SURGERY

## 2022-10-26 PROCEDURE — 3074F SYST BP LT 130 MM HG: CPT | Mod: CPTII,,, | Performed by: ORTHOPAEDIC SURGERY

## 2022-10-26 PROCEDURE — 1125F AMNT PAIN NOTED PAIN PRSNT: CPT | Mod: CPTII,,, | Performed by: ORTHOPAEDIC SURGERY

## 2022-10-26 NOTE — PROGRESS NOTES
"Subjective:    CC: Injections of the Right Knee and Injections (R knee Asp and inj 8/31/22 knee inj 9/14/22. pt states injection helped. pt taking pain meds. )       HPI:  Patient returns today for repeat exam.  Patient states she continues to have pain, occasional waking up at night in both knees.  She has had injections in the last couple of months with minimal to some relief.  She is considering knee replacement.    ROS: Refer to HPI for pertinent ROS. All other 12 point systems negative.    Objective:  Vitals:    10/26/22 1326   BP: 117/80   BP Location: Left arm   Patient Position: Sitting   BP Method: Medium (Automatic)   Pulse: 80   Temp: 98.1 °F (36.7 °C)   Weight: 81.6 kg (180 lb)   Height: 5' 7" (1.702 m)        Physical Exam:  The patient is well-nourished, well-developed, in no apparent distress, pleasant and cooperative. Examination of the bilateral lower extremities,  compartments are soft and warm. Skin is intact. There are no signs or symptoms of DVT or infection. There is no obvious joint effusion. There is no erythema. Tenderness to palpation along the mediolateral joint line, right knee range of motion is 5-100; left knee range of motion is 5-100. The knee is stable to stressing with varus and valgus. Negative anterior and posterior drawer.   Negative Lachman´s.  Negative Jalen's test. Patella grind is positive, negative for apprehension.  Patient is neurovascularly intact distally.      Images: . Images Reviewed and discussed with patient.    Assessment:  1. Primary osteoarthritis of both knees        Plan:  At this time we discussed her physical exam and previous imaging findings.  We discussed the total joint class, continue low-impact activities, I would like see back in 3 months to see how she is progressing otherwise she will call or return sooner if there is any new problems or difficulties.    Follow UP: No follow-ups on file.                "

## 2022-10-31 ENCOUNTER — HOSPITAL ENCOUNTER (OUTPATIENT)
Dept: RADIOLOGY | Facility: HOSPITAL | Age: 67
Discharge: HOME | End: 2022-10-31
Attending: NURSE PRACTITIONER
Payer: MEDICARE

## 2022-10-31 DIAGNOSIS — Z12.31 ENCOUNTER FOR SCREENING MAMMOGRAM FOR BREAST CANCER: ICD-10-CM

## 2022-10-31 PROCEDURE — 77063 BREAST TOMOSYNTHESIS BI: CPT | Mod: 26,,, | Performed by: RADIOLOGY

## 2022-10-31 PROCEDURE — 77067 MAMMO DIGITAL SCREENING BILAT WITH TOMO: ICD-10-PCS | Mod: 26,,, | Performed by: RADIOLOGY

## 2022-10-31 PROCEDURE — 77067 SCR MAMMO BI INCL CAD: CPT | Mod: 26,,, | Performed by: RADIOLOGY

## 2022-10-31 PROCEDURE — 77063 MAMMO DIGITAL SCREENING BILAT WITH TOMO: ICD-10-PCS | Mod: 26,,, | Performed by: RADIOLOGY

## 2022-10-31 PROCEDURE — 77067 SCR MAMMO BI INCL CAD: CPT | Mod: TC

## 2022-11-12 LAB
LEFT EYE DM RETINOPATHY: NEGATIVE
RIGHT EYE DM RETINOPATHY: NEGATIVE

## 2022-11-23 ENCOUNTER — OFFICE VISIT (OUTPATIENT)
Dept: ORTHOPEDICS | Facility: CLINIC | Age: 67
End: 2022-11-23
Payer: MEDICARE

## 2022-11-23 VITALS — BODY MASS INDEX: 28.25 KG/M2 | HEIGHT: 67 IN | WEIGHT: 180 LBS

## 2022-11-23 DIAGNOSIS — M17.0 PRIMARY OSTEOARTHRITIS OF BOTH KNEES: Primary | ICD-10-CM

## 2022-11-23 PROCEDURE — 3288F FALL RISK ASSESSMENT DOCD: CPT | Mod: CPTII,,, | Performed by: ORTHOPAEDIC SURGERY

## 2022-11-23 PROCEDURE — 3288F PR FALLS RISK ASSESSMENT DOCUMENTED: ICD-10-PCS | Mod: CPTII,,, | Performed by: ORTHOPAEDIC SURGERY

## 2022-11-23 PROCEDURE — 3008F PR BODY MASS INDEX (BMI) DOCUMENTED: ICD-10-PCS | Mod: CPTII,,, | Performed by: ORTHOPAEDIC SURGERY

## 2022-11-23 PROCEDURE — 1101F PR PT FALLS ASSESS DOC 0-1 FALLS W/OUT INJ PAST YR: ICD-10-PCS | Mod: CPTII,,, | Performed by: ORTHOPAEDIC SURGERY

## 2022-11-23 PROCEDURE — 99213 PR OFFICE/OUTPT VISIT, EST, LEVL III, 20-29 MIN: ICD-10-PCS | Mod: ,,, | Performed by: ORTHOPAEDIC SURGERY

## 2022-11-23 PROCEDURE — 1160F RVW MEDS BY RX/DR IN RCRD: CPT | Mod: CPTII,,, | Performed by: ORTHOPAEDIC SURGERY

## 2022-11-23 PROCEDURE — 1160F PR REVIEW ALL MEDS BY PRESCRIBER/CLIN PHARMACIST DOCUMENTED: ICD-10-PCS | Mod: CPTII,,, | Performed by: ORTHOPAEDIC SURGERY

## 2022-11-23 PROCEDURE — 3008F BODY MASS INDEX DOCD: CPT | Mod: CPTII,,, | Performed by: ORTHOPAEDIC SURGERY

## 2022-11-23 PROCEDURE — 99213 OFFICE O/P EST LOW 20 MIN: CPT | Mod: ,,, | Performed by: ORTHOPAEDIC SURGERY

## 2022-11-23 PROCEDURE — 4010F ACE/ARB THERAPY RXD/TAKEN: CPT | Mod: CPTII,,, | Performed by: ORTHOPAEDIC SURGERY

## 2022-11-23 PROCEDURE — 1159F MED LIST DOCD IN RCRD: CPT | Mod: CPTII,,, | Performed by: ORTHOPAEDIC SURGERY

## 2022-11-23 PROCEDURE — 1101F PT FALLS ASSESS-DOCD LE1/YR: CPT | Mod: CPTII,,, | Performed by: ORTHOPAEDIC SURGERY

## 2022-11-23 PROCEDURE — 1159F PR MEDICATION LIST DOCUMENTED IN MEDICAL RECORD: ICD-10-PCS | Mod: CPTII,,, | Performed by: ORTHOPAEDIC SURGERY

## 2022-11-23 PROCEDURE — 4010F PR ACE/ARB THEARPY RXD/TAKEN: ICD-10-PCS | Mod: CPTII,,, | Performed by: ORTHOPAEDIC SURGERY

## 2022-11-23 NOTE — PROGRESS NOTES
"Subjective:    CC: Pain of the Left Knee, Pain of the Right Knee, and Pain (R knee asp and inj 8/31/22 // L knee inj 9/14/22 - pt states that both of her knees hurt and possible fluid - she states that she would like surgery, but has to wait until after the new year - she would like injections today if possible - using topical pain relief - td)       HPI:  Patient returns today for repeat exam.  Patient continues to have pain in both knees especially long standing walking and bending.  She is ready to proceed with a knee replacement.  Right worse than left    ROS: Refer to HPI for pertinent ROS. All other 12 point systems negative.    Objective:  Vitals:    11/23/22 0958   Weight: 81.6 kg (180 lb)   Height: 5' 7" (1.702 m)        Physical Exam:  The patient is well-nourished, well-developed, in no apparent distress, pleasant and cooperative. Examination of the bilateral lower extremities,  compartments are soft and warm. Skin is intact. There are no signs or symptoms of DVT or infection. There is no obvious joint effusion. There is no erythema. Tenderness to palpation along the patellofemoral joint bilaterally, right knee range of motion is 5-95; left knee range of motion is 0-105. The knee is stable to stressing with varus and valgus. Negative anterior and posterior drawer.   Negative Lachman´s.  Negative Jalen's test. Patella grind is positive, negative for apprehension.  Patient is neurovascularly intact distally.      Images:  Previous x-rays reviewed. Images Reviewed and discussed with patient.    Assessment:  1. Primary osteoarthritis of both knees        Plan:  At this time we discussed her physical exam and previous x-ray findings.  We have discussed the pros and cons to additional conservative treatments well surgical intervention.  She would like to proceed with a right total knee arthroplasty.  We have discussed the down time rehab process afterwards.  We will set this up at her " convenience.      Follow UP: No follow-ups on file.

## 2022-11-30 ENCOUNTER — OFFICE VISIT (OUTPATIENT)
Dept: GYNECOLOGY | Facility: CLINIC | Age: 67
End: 2022-11-30
Payer: MEDICARE

## 2022-11-30 VITALS
WEIGHT: 183 LBS | HEART RATE: 70 BPM | TEMPERATURE: 98 F | SYSTOLIC BLOOD PRESSURE: 109 MMHG | RESPIRATION RATE: 20 BRPM | OXYGEN SATURATION: 97 % | BODY MASS INDEX: 28.72 KG/M2 | HEIGHT: 67 IN | DIASTOLIC BLOOD PRESSURE: 66 MMHG

## 2022-11-30 DIAGNOSIS — L90.0 LICHEN SCLEROSUS: Primary | ICD-10-CM

## 2022-11-30 PROCEDURE — 99215 OFFICE O/P EST HI 40 MIN: CPT | Mod: PBBFAC

## 2022-11-30 NOTE — PROGRESS NOTES
[facility] OB/GYN CLINIC NOTE  [facility]  [address]  [place] [address]  Phone: [phone]  Fax: [phone]    Subjective:     Rhoda Cesar is a 67 y.o.  presents for 6 months Lichen Sclerosis f/u. Pt stopped taking her Clobetasol 3-4 months following initiation. Currently has no complaints. Denies any vaginal bleeding, pruritus, or pain. Still on Premarin twice weekly. No new GYN complaints today.    MedHx:   Past Medical History:   Diagnosis Date    Diabetes mellitus     Hypertension     Thyroid disease        SurgHx:   Past Surgical History:   Procedure Laterality Date    APPENDECTOMY      CHOLECYSTECTOMY      KNEE ARTHROSCOPY      TUBAL LIGATION         Medications:     Current Outpatient Medications:     acetaminophen (TYLENOL) 650 MG TbSR, Take 650 mg by mouth every 8 (eight) hours., Disp: , Rfl:     ascorbic acid, vitamin C, (VITAMIN C) 500 MG tablet, Take 500 mg by mouth once daily., Disp: , Rfl:     blood-glucose meter (ONETOUCH ULTRA2 METER) Misc, USE TO CHECK BLOOD GLUCOSE LEVELS ONCE DAILY, Disp: 1 each, Rfl: 0    cetirizine (ZYRTEC) 10 MG tablet, TAKE ONE TABLET BY MOUTH EVERY DAY, Disp: 30 tablet, Rfl: 11    chlorhexidine (PERIDEX) 0.12 % solution, SMARTSIG:By Mouth, Disp: , Rfl:     conjugated estrogens (PREMARIN) vaginal cream, Place 0.5 g vaginally twice a week. Apply small amount to gloved finger and apply to vaginal walls., Disp: 30 g, Rfl: 6    empagliflozin (JARDIANCE) 10 mg tablet, Take 10 mg by mouth nightly., Disp: , Rfl:     ferrous sulfate (FEOSOL) Tab tablet, Take 1 tablet by mouth daily with breakfast., Disp: , Rfl:     gabapentin (NEURONTIN) 300 MG capsule, Take 2 capsules (600 mg total) by mouth 2 (two) times daily., Disp: 120 capsule, Rfl: 11    lisinopriL 10 MG tablet, TAKE ONE TABLET BY MOUTH EVERY DAY, Disp: 90 tablet, Rfl: 3    meloxicam (MOBIC) 15 MG tablet, Take 1 tablet (15 mg total) by mouth once daily., Disp: 30 tablet, Rfl: 11    multivitamin (THERAGRAN) per tablet,  "Take 1 tablet by mouth once daily., Disp: , Rfl:     ONETOUCH DELICA PLUS LANCET 33 gauge Misc, , Disp: , Rfl:     ONETOUCH ULTRA TEST Strp, , Disp: , Rfl:     pantoprazole (PROTONIX) 40 MG tablet, Take 1 tablet (40 mg total) by mouth once daily., Disp: 30 tablet, Rfl: 11    rosuvastatin (CRESTOR) 10 MG tablet, Take 1 tablet (10 mg total) by mouth once daily., Disp: 30 tablet, Rfl: 11    SYNTHROID 150 mcg tablet, TAKE ONE TABLET BY MOUTH EVERY DAY, Disp: 30 tablet, Rfl: 11    vitamin E 200 UNIT capsule, Take 200 Units by mouth once daily., Disp: , Rfl:     zinc gluconate 50 mg tablet, Take 50 mg by mouth once daily., Disp: , Rfl:     FM Hx:   Family History   Problem Relation Age of Onset    Stroke Father     Heart attack Father     Peripheral vascular disease Father     Ulcers Father     Stroke Mother     Diabetes Mother     Heart failure Mother     Cancer Mother     Arthritis Mother     Stroke Brother     Arrhythmia Brother     Heart failure Brother     Stroke Sister     Diabetes Sister     Cervical cancer Sister     Deep vein thrombosis Sister     Ulcerative colitis Sister     Arthritis Daughter        Social Hx: Denies tobacco, alcohol and illicit drug usage.  Social History     Socioeconomic History    Marital status:    Tobacco Use    Smoking status: Never    Smokeless tobacco: Never   Substance and Sexual Activity    Alcohol use: Not Currently    Drug use: Never    Sexual activity: Not Currently     Birth control/protection: None     Review of Systems  Denies fevers, chills, headache, blurry vision, nausea, vomiting, dizziness, or syncope.   Denies chest pain, shortness of breath, RUQ pain, or calf pain.    Objective:     Vitals:    11/30/22 1316   BP: 109/66   Pulse: 70   Resp: 20   Temp: 97.6 °F (36.4 °C)   SpO2: 97%   Weight: 83 kg (183 lb)   Height: 5' 7" (1.702 m)     Body mass index is 28.66 kg/m².    Physical Exam:     Gen: Alert, cooperative, in no acute distress.  HEENT: No thyromegaly, " goiter, or masses  Chest: Clear and equal, no wheezes/rales/rhonchi  Abdomen: Soft, non-tender, no masses.  Punch biopsy location: healed  Extrem: Extremities normal, atraumatic, non-tender calves.  External genitalia: No areas of thinning noted as previously described. Agglutination of labia minora and clitoral casanova noted. No new lesions/masses noted.   Speculum Exam: vaginal vault without discharge, nonodorous, no lesions/masses seen.  Cervical os visualized as closed, no lesions/masses.   Bimanual Exam: No cervical motion tenderness.  Uterus freely mobile.  No adnexal masses. Nontender exam.     Note: RN chaperone present for entirety of exam.     Assessment/Plan:    Rhoda Cesar is a 67 y.o.  with lichen sclerosus, now resolved.    Lichen Sclerosus  Resolved following Clobetasol. Denies any residual Sx. Physical exam reassuring.   Continue vaginal Premarin two-three times weekly    RTC for WWE w/ NP    Future Appointments   Date Time Provider Department Center   12/15/2022  1:15 PM Dev Weaver MD [facility] [place]   2022  8:00 AM BRUNO Darden [facility]   2023  9:10 AM CATHLEEN Brito [facility] [place]     Patient and plan were discussed with Dr. Burrell.    Marcelo Espinal MD, MPH  [employer] -  [id number]  Pager: [phone]  1:21 PM 2022

## 2022-12-08 NOTE — PROGRESS NOTES
Patient Centered Pre Visit :    Pre-Visit Chart Review    Seen any other health care providers since last visit?Y    Seen in ER, urgent care clinic, or been admitted since last visit?N    Preventative health screenings current:   Mammogram DUE:  Colonoscopy DUE:    Target Diagnosis: DM    Lab work/Tests Needed:Y/DONE    Wellness: 6/2023

## 2022-12-12 ENCOUNTER — OFFICE VISIT (OUTPATIENT)
Dept: ORTHOPEDICS | Facility: CLINIC | Age: 67
End: 2022-12-12
Payer: MEDICARE

## 2022-12-12 ENCOUNTER — HOSPITAL ENCOUNTER (OUTPATIENT)
Dept: RADIOLOGY | Facility: CLINIC | Age: 67
Discharge: HOME | End: 2022-12-12
Payer: MEDICARE

## 2022-12-12 ENCOUNTER — HOSPITAL ENCOUNTER (OUTPATIENT)
Dept: RADIOLOGY | Facility: HOSPITAL | Age: 67
Discharge: HOME | End: 2022-12-12
Payer: MEDICARE

## 2022-12-12 VITALS
BODY MASS INDEX: 28.35 KG/M2 | HEART RATE: 54 BPM | DIASTOLIC BLOOD PRESSURE: 81 MMHG | WEIGHT: 180.63 LBS | HEIGHT: 67 IN | SYSTOLIC BLOOD PRESSURE: 134 MMHG

## 2022-12-12 DIAGNOSIS — Z01.818 PRE-OP TESTING: ICD-10-CM

## 2022-12-12 DIAGNOSIS — M17.11 PRIMARY OSTEOARTHRITIS OF RIGHT KNEE: ICD-10-CM

## 2022-12-12 DIAGNOSIS — M17.11 PRIMARY OSTEOARTHRITIS OF RIGHT KNEE: Primary | ICD-10-CM

## 2022-12-12 DIAGNOSIS — R73.9 HYPERGLYCEMIA: ICD-10-CM

## 2022-12-12 PROCEDURE — 71046 X-RAY EXAM CHEST 2 VIEWS: CPT | Mod: TC

## 2022-12-12 PROCEDURE — 4010F ACE/ARB THERAPY RXD/TAKEN: CPT | Mod: CPTII,,,

## 2022-12-12 PROCEDURE — 4010F PR ACE/ARB THEARPY RXD/TAKEN: ICD-10-PCS | Mod: CPTII,,,

## 2022-12-12 PROCEDURE — 3079F DIAST BP 80-89 MM HG: CPT | Mod: CPTII,,,

## 2022-12-12 PROCEDURE — 99213 PR OFFICE/OUTPT VISIT, EST, LEVL III, 20-29 MIN: ICD-10-PCS | Mod: ,,,

## 2022-12-12 PROCEDURE — 1160F RVW MEDS BY RX/DR IN RCRD: CPT | Mod: CPTII,,,

## 2022-12-12 PROCEDURE — 1159F PR MEDICATION LIST DOCUMENTED IN MEDICAL RECORD: ICD-10-PCS | Mod: CPTII,,,

## 2022-12-12 PROCEDURE — 1159F MED LIST DOCD IN RCRD: CPT | Mod: CPTII,,,

## 2022-12-12 PROCEDURE — 3079F PR MOST RECENT DIASTOLIC BLOOD PRESSURE 80-89 MM HG: ICD-10-PCS | Mod: CPTII,,,

## 2022-12-12 PROCEDURE — 73564 XR KNEE COMP 4 OR MORE VIEWS RIGHT: ICD-10-PCS | Mod: RT,,,

## 2022-12-12 PROCEDURE — 1160F PR REVIEW ALL MEDS BY PRESCRIBER/CLIN PHARMACIST DOCUMENTED: ICD-10-PCS | Mod: CPTII,,,

## 2022-12-12 PROCEDURE — 3075F PR MOST RECENT SYSTOLIC BLOOD PRESS GE 130-139MM HG: ICD-10-PCS | Mod: CPTII,,,

## 2022-12-12 PROCEDURE — 3008F PR BODY MASS INDEX (BMI) DOCUMENTED: ICD-10-PCS | Mod: CPTII,,,

## 2022-12-12 PROCEDURE — 99213 OFFICE O/P EST LOW 20 MIN: CPT | Mod: ,,,

## 2022-12-12 PROCEDURE — 3075F SYST BP GE 130 - 139MM HG: CPT | Mod: CPTII,,,

## 2022-12-12 PROCEDURE — 73564 X-RAY EXAM KNEE 4 OR MORE: CPT | Mod: RT,,,

## 2022-12-12 PROCEDURE — 3008F BODY MASS INDEX DOCD: CPT | Mod: CPTII,,,

## 2022-12-12 RX ORDER — GABAPENTIN 100 MG/1
300 CAPSULE ORAL
Status: CANCELLED | OUTPATIENT
Start: 2022-12-12

## 2022-12-12 RX ORDER — TRANEXAMIC ACID 650 MG/1
1950 TABLET ORAL
Status: CANCELLED | OUTPATIENT
Start: 2022-12-12 | End: 2022-12-12

## 2022-12-12 RX ORDER — SCOLOPAMINE TRANSDERMAL SYSTEM 1 MG/1
1 PATCH, EXTENDED RELEASE TRANSDERMAL ONCE AS NEEDED
Status: CANCELLED | OUTPATIENT
Start: 2022-12-12 | End: 2034-05-10

## 2022-12-12 RX ORDER — SODIUM CHLORIDE, SODIUM GLUCONATE, SODIUM ACETATE, POTASSIUM CHLORIDE AND MAGNESIUM CHLORIDE 30; 37; 368; 526; 502 MG/100ML; MG/100ML; MG/100ML; MG/100ML; MG/100ML
250 INJECTION, SOLUTION INTRAVENOUS CONTINUOUS
Status: CANCELLED | OUTPATIENT
Start: 2022-12-12

## 2022-12-12 RX ORDER — GABAPENTIN 100 MG/1
600 CAPSULE ORAL
Status: CANCELLED | OUTPATIENT
Start: 2022-12-12

## 2022-12-12 RX ORDER — LEVOFLOXACIN 500 MG/1
500 TABLET, FILM COATED ORAL DAILY
Qty: 10 TABLET | Refills: 0 | Status: SHIPPED | OUTPATIENT
Start: 2022-12-12 | End: 2022-12-22

## 2022-12-12 RX ORDER — ACETAMINOPHEN 500 MG
1000 TABLET ORAL
Status: CANCELLED | OUTPATIENT
Start: 2022-12-12

## 2022-12-12 NOTE — H&P (VIEW-ONLY)
Admission History & Physical    Subjective:    CC: Pre-op Exam of the Right Knee and Pre-op Exam (pre op for right tka )       HPI:  Rhoda Cesar presents today for preoperative evaluation for right total knee arthroplasty with Андрей robotic assistance.  She continues to have pain with long standing, walking and bending.  She has failed to improve with conservative management.  I reviewed the indications for surgery. The risks and benefits of the proposed and alternative treatments were discussed with the patient. Questions pertinent to the procedure were solicited and answered.  Patient verbalized understanding and was given instruction to call clinic with any further questions.No assurances were given. Informed consent was obtained. The patient expressed good understanding and wished to proceed with scheduling the procedure.     This patient has  Increased pain with activity Initiation  Interference with normal activities of daily living due to pain  Increased pain with weight bearing activities  Pain with range of motion    This patient has an active or unstable comorbidity that significantly increases the mortality risk and require more than 24 hours of postoperative monitoring.    This comorbidity is: Diabetes mellitus (DM) with HbA1C greater than or equal to 7 %    Patient will be admitted as inpatient status due to: Taking advanced age, medical co-morbidities and PLOF into consideration, the patient is at higher risk for falls, dehydration, electrolyte imbalance, post-operative anemia and a higher risk of adverse effects related to surgery and anesthesia. Will admit as inpatient for closer monitoring of vital signs for hypotension, level of consciousness, respiratory status and the patient's overall response to narcotics. Will also monitor labs daily, replace electrolytes and provide fluid resuscitation as needed and plan to facilitate discharge once patient is hemodynamically stable, electrolytes WNL,  tolerating pain and narcotics appropriately and once the patient has been deemed safe from a mobility/safety standpoint.         ROS:   Constitutional: No fever, weakness, or fatigue.   Ear/Nose/Mouth/Throat: No nasal congestion or sore throat.   Respiratory: No shortness of breath or cough.   Cardiovascular: No chest pain, palpitations, or peripheral edema.   Gastrointestinal: No nausea, vomiting, or abdominal pain.   Genitourinary: No dysuria.  Musculoskeletal: right knee pain, swelling, loss of motion.    Past Surgical History:   Procedure Laterality Date    APPENDECTOMY      CHOLECYSTECTOMY      KNEE ARTHROSCOPY      TUBAL LIGATION          Past Medical History:   Diagnosis Date    Diabetes mellitus     Hypertension     Thyroid disease         Objective:    Vitals:    12/12/22 0942   BP: 134/81   Pulse: (!) 54        Physical Exam:    Appearance: No distress, good color on room air. Alert and cooperative.  HEENT: Normocephalic. PERRLA EOM intact.   Lungs: Breathing unlabored.  Heart: Regular rate and rhythm.  Abdomen: Soft, non-tender.  No rebound tenderness.  Extremities:  Examination of the right lower extremity compartments are soft and warm. Skin is intact. There are no signs or symptoms of DVT or infection. There is no joint effusion. There is no erythema. Tender to palpation along the anterior joint line ,right knee range of motion is 0-100 degrees.  Patella tracking appropriately.  Patella grind is positive, Negative for apprehension. Neurovascularly intact distally.  X-rays:  Four views of the right knee demonstrate significant tricompartmental osteoarthritis; kg grade 3.  No obvious fracture dislocation  Skin: No rashes or open wounds.        Assessment:  1. Primary osteoarthritis of right knee  - CT Knee w/o Contrast Right w/Андрей Protocol; Future  - X-Ray Knee Complete 4 Or More Views Right; Future  - Vital signs; Standing  - Insert peripheral IV; Standing  - Clip and Prep Other (please specifiy)  (Operative site); Standing  - Cleanse with Chlorhexidine (CHG); Standing  - Diet NPO; Standing  - electrolyte-A infusion  - IP VTE LOW RISK PATIENT; Standing  - ceFAZolin (ANCEF) 2 g in dextrose 5 % 50 mL IVPB  - acetaminophen tablet 1,000 mg  - gabapentin capsule 600 mg  - gabapentin capsule 300 mg  - scopolamine 1.3-1.5 mg (1 mg over 3 days) 1 patch  - tranexamic acid (LYSTEDA) tablet 1,950 mg  - POCT glucose; Standing  - CBC auto differential; Future  - Comprehensive metabolic panel; Future  - Urinalysis; Future  - X-Ray Chest PA And Lateral; Future  - EKG 12-lead; Future  - Inpatient consult to Anesthesiology; Standing  - Admit to Inpatient; Standing  - Place sequential compression device; Standing  - Place LUIS hose; Standing  - Chlorohexidine Gluconate Bath; Standing  - MRSA PCR; Future  - Hemoglobin A1C; Future  - Case Request Operating Room: ROBOTIC ARTHROPLASTY, KNEE, TOTAL    2. Pre-op testing  - Vital signs; Standing  - Insert peripheral IV; Standing  - Clip and Prep Other (please specifiy) (Operative site); Standing  - Cleanse with Chlorhexidine (CHG); Standing  - Diet NPO; Standing  - electrolyte-A infusion  - IP VTE LOW RISK PATIENT; Standing  - ceFAZolin (ANCEF) 2 g in dextrose 5 % 50 mL IVPB  - acetaminophen tablet 1,000 mg  - gabapentin capsule 600 mg  - gabapentin capsule 300 mg  - scopolamine 1.3-1.5 mg (1 mg over 3 days) 1 patch  - tranexamic acid (LYSTEDA) tablet 1,950 mg  - POCT glucose; Standing  - CBC auto differential; Future  - Comprehensive metabolic panel; Future  - Urinalysis; Future  - X-Ray Chest PA And Lateral; Future  - EKG 12-lead; Future  - Inpatient consult to Anesthesiology; Standing  - Admit to Inpatient; Standing  - Place sequential compression device; Standing  - Place LUIS hose; Standing  - Chlorohexidine Gluconate Bath; Standing  - MRSA PCR; Future  - Hemoglobin A1C; Future    3. Hyperglycemia  - Hemoglobin A1C; Future       Plan:  Plan for right total knee arthroplasty with  Андрей robotic assistance at [facility]. The patient has been given preoperative instructions. Post-operative appointment is scheduled for 2 weeks.  Patient was given knee exercises for preop rehab.

## 2022-12-12 NOTE — H&P
Admission History & Physical    Subjective:    CC: Pre-op Exam of the Right Knee and Pre-op Exam (pre op for right tka )       HPI:  Rhoda Cesar presents today for preoperative evaluation for right total knee arthroplasty with Андрей robotic assistance.  She continues to have pain with long standing, walking and bending.  She has failed to improve with conservative management.  I reviewed the indications for surgery. The risks and benefits of the proposed and alternative treatments were discussed with the patient. Questions pertinent to the procedure were solicited and answered.  Patient verbalized understanding and was given instruction to call clinic with any further questions.No assurances were given. Informed consent was obtained. The patient expressed good understanding and wished to proceed with scheduling the procedure.     This patient has  Increased pain with activity Initiation  Interference with normal activities of daily living due to pain  Increased pain with weight bearing activities  Pain with range of motion    This patient has an active or unstable comorbidity that significantly increases the mortality risk and require more than 24 hours of postoperative monitoring.    This comorbidity is: Diabetes mellitus (DM) with HbA1C greater than or equal to 7 %    Patient will be admitted as inpatient status due to: Taking advanced age, medical co-morbidities and PLOF into consideration, the patient is at higher risk for falls, dehydration, electrolyte imbalance, post-operative anemia and a higher risk of adverse effects related to surgery and anesthesia. Will admit as inpatient for closer monitoring of vital signs for hypotension, level of consciousness, respiratory status and the patient's overall response to narcotics. Will also monitor labs daily, replace electrolytes and provide fluid resuscitation as needed and plan to facilitate discharge once patient is hemodynamically stable, electrolytes WNL,  tolerating pain and narcotics appropriately and once the patient has been deemed safe from a mobility/safety standpoint.         ROS:   Constitutional: No fever, weakness, or fatigue.   Ear/Nose/Mouth/Throat: No nasal congestion or sore throat.   Respiratory: No shortness of breath or cough.   Cardiovascular: No chest pain, palpitations, or peripheral edema.   Gastrointestinal: No nausea, vomiting, or abdominal pain.   Genitourinary: No dysuria.  Musculoskeletal: right knee pain, swelling, loss of motion.    Past Surgical History:   Procedure Laterality Date    APPENDECTOMY      CHOLECYSTECTOMY      KNEE ARTHROSCOPY      TUBAL LIGATION          Past Medical History:   Diagnosis Date    Diabetes mellitus     Hypertension     Thyroid disease         Objective:    Vitals:    12/12/22 0942   BP: 134/81   Pulse: (!) 54        Physical Exam:    Appearance: No distress, good color on room air. Alert and cooperative.  HEENT: Normocephalic. PERRLA EOM intact.   Lungs: Breathing unlabored.  Heart: Regular rate and rhythm.  Abdomen: Soft, non-tender.  No rebound tenderness.  Extremities:  Examination of the right lower extremity compartments are soft and warm. Skin is intact. There are no signs or symptoms of DVT or infection. There is no joint effusion. There is no erythema. Tender to palpation along the anterior joint line ,right knee range of motion is 0-100 degrees.  Patella tracking appropriately.  Patella grind is positive, Negative for apprehension. Neurovascularly intact distally.  X-rays:  Four views of the right knee demonstrate significant tricompartmental osteoarthritis; kg grade 3.  No obvious fracture dislocation  Skin: No rashes or open wounds.        Assessment:  1. Primary osteoarthritis of right knee  - CT Knee w/o Contrast Right w/Андрей Protocol; Future  - X-Ray Knee Complete 4 Or More Views Right; Future  - Vital signs; Standing  - Insert peripheral IV; Standing  - Clip and Prep Other (please specifiy)  (Operative site); Standing  - Cleanse with Chlorhexidine (CHG); Standing  - Diet NPO; Standing  - electrolyte-A infusion  - IP VTE LOW RISK PATIENT; Standing  - ceFAZolin (ANCEF) 2 g in dextrose 5 % 50 mL IVPB  - acetaminophen tablet 1,000 mg  - gabapentin capsule 600 mg  - gabapentin capsule 300 mg  - scopolamine 1.3-1.5 mg (1 mg over 3 days) 1 patch  - tranexamic acid (LYSTEDA) tablet 1,950 mg  - POCT glucose; Standing  - CBC auto differential; Future  - Comprehensive metabolic panel; Future  - Urinalysis; Future  - X-Ray Chest PA And Lateral; Future  - EKG 12-lead; Future  - Inpatient consult to Anesthesiology; Standing  - Admit to Inpatient; Standing  - Place sequential compression device; Standing  - Place LUIS hose; Standing  - Chlorohexidine Gluconate Bath; Standing  - MRSA PCR; Future  - Hemoglobin A1C; Future  - Case Request Operating Room: ROBOTIC ARTHROPLASTY, KNEE, TOTAL    2. Pre-op testing  - Vital signs; Standing  - Insert peripheral IV; Standing  - Clip and Prep Other (please specifiy) (Operative site); Standing  - Cleanse with Chlorhexidine (CHG); Standing  - Diet NPO; Standing  - electrolyte-A infusion  - IP VTE LOW RISK PATIENT; Standing  - ceFAZolin (ANCEF) 2 g in dextrose 5 % 50 mL IVPB  - acetaminophen tablet 1,000 mg  - gabapentin capsule 600 mg  - gabapentin capsule 300 mg  - scopolamine 1.3-1.5 mg (1 mg over 3 days) 1 patch  - tranexamic acid (LYSTEDA) tablet 1,950 mg  - POCT glucose; Standing  - CBC auto differential; Future  - Comprehensive metabolic panel; Future  - Urinalysis; Future  - X-Ray Chest PA And Lateral; Future  - EKG 12-lead; Future  - Inpatient consult to Anesthesiology; Standing  - Admit to Inpatient; Standing  - Place sequential compression device; Standing  - Place LUIS hose; Standing  - Chlorohexidine Gluconate Bath; Standing  - MRSA PCR; Future  - Hemoglobin A1C; Future    3. Hyperglycemia  - Hemoglobin A1C; Future       Plan:  Plan for right total knee arthroplasty with  Андрей robotic assistance at [facility]. The patient has been given preoperative instructions. Post-operative appointment is scheduled for 2 weeks.  Patient was given knee exercises for preop rehab.

## 2022-12-15 ENCOUNTER — TELEPHONE (OUTPATIENT)
Dept: ORTHOPEDICS | Facility: CLINIC | Age: 67
End: 2022-12-15

## 2022-12-15 NOTE — TELEPHONE ENCOUNTER
Spoke to patient about request to move sx to 1/3/23.     Pt verbalized understanding that we have moved surgery, also that clearance was needed from her doctor before surgery.     Advised her that she needed a1c drawn again the week before surgery.     She verbalized understanding.

## 2022-12-19 ENCOUNTER — OFFICE VISIT (OUTPATIENT)
Dept: FAMILY MEDICINE | Facility: CLINIC | Age: 67
End: 2022-12-19
Payer: MEDICARE

## 2022-12-19 VITALS
SYSTOLIC BLOOD PRESSURE: 112 MMHG | BODY MASS INDEX: 28.33 KG/M2 | DIASTOLIC BLOOD PRESSURE: 68 MMHG | OXYGEN SATURATION: 98 % | WEIGHT: 180.5 LBS | TEMPERATURE: 99 F | HEIGHT: 67 IN | RESPIRATION RATE: 18 BRPM | HEART RATE: 60 BPM

## 2022-12-19 DIAGNOSIS — E11.9 TYPE 2 DIABETES MELLITUS WITHOUT COMPLICATION, UNSPECIFIED WHETHER LONG TERM INSULIN USE: Primary | ICD-10-CM

## 2022-12-19 DIAGNOSIS — Z01.818 PREOPERATIVE CLEARANCE: ICD-10-CM

## 2022-12-19 PROCEDURE — 3008F BODY MASS INDEX DOCD: CPT | Mod: ,,, | Performed by: NURSE PRACTITIONER

## 2022-12-19 PROCEDURE — 4010F PR ACE/ARB THEARPY RXD/TAKEN: ICD-10-PCS | Mod: ,,, | Performed by: NURSE PRACTITIONER

## 2022-12-19 PROCEDURE — 1126F AMNT PAIN NOTED NONE PRSNT: CPT | Mod: ,,, | Performed by: NURSE PRACTITIONER

## 2022-12-19 PROCEDURE — 3288F PR FALLS RISK ASSESSMENT DOCUMENTED: ICD-10-PCS | Mod: ,,, | Performed by: NURSE PRACTITIONER

## 2022-12-19 PROCEDURE — 3078F DIAST BP <80 MM HG: CPT | Mod: ,,, | Performed by: NURSE PRACTITIONER

## 2022-12-19 PROCEDURE — 1126F PR PAIN SEVERITY QUANTIFIED, NO PAIN PRESENT: ICD-10-PCS | Mod: ,,, | Performed by: NURSE PRACTITIONER

## 2022-12-19 PROCEDURE — 1101F PR PT FALLS ASSESS DOC 0-1 FALLS W/OUT INJ PAST YR: ICD-10-PCS | Mod: ,,, | Performed by: NURSE PRACTITIONER

## 2022-12-19 PROCEDURE — 3074F SYST BP LT 130 MM HG: CPT | Mod: ,,, | Performed by: NURSE PRACTITIONER

## 2022-12-19 PROCEDURE — 3288F FALL RISK ASSESSMENT DOCD: CPT | Mod: ,,, | Performed by: NURSE PRACTITIONER

## 2022-12-19 PROCEDURE — 4010F ACE/ARB THERAPY RXD/TAKEN: CPT | Mod: ,,, | Performed by: NURSE PRACTITIONER

## 2022-12-19 PROCEDURE — 99214 OFFICE O/P EST MOD 30 MIN: CPT | Mod: ,,, | Performed by: NURSE PRACTITIONER

## 2022-12-19 PROCEDURE — 1101F PT FALLS ASSESS-DOCD LE1/YR: CPT | Mod: ,,, | Performed by: NURSE PRACTITIONER

## 2022-12-19 PROCEDURE — 3078F PR MOST RECENT DIASTOLIC BLOOD PRESSURE < 80 MM HG: ICD-10-PCS | Mod: ,,, | Performed by: NURSE PRACTITIONER

## 2022-12-19 PROCEDURE — 99214 PR OFFICE/OUTPT VISIT, EST, LEVL IV, 30-39 MIN: ICD-10-PCS | Mod: ,,, | Performed by: NURSE PRACTITIONER

## 2022-12-19 PROCEDURE — 3008F PR BODY MASS INDEX (BMI) DOCUMENTED: ICD-10-PCS | Mod: ,,, | Performed by: NURSE PRACTITIONER

## 2022-12-19 PROCEDURE — 3074F PR MOST RECENT SYSTOLIC BLOOD PRESSURE < 130 MM HG: ICD-10-PCS | Mod: ,,, | Performed by: NURSE PRACTITIONER

## 2022-12-19 NOTE — PROGRESS NOTES
Subjective:       Patient ID: Rhoda Cesar is a 67 y.o. female.    ----------------------------  Diabetes mellitus  Hypertension  Thyroid disease     Chief Complaint: Follow-up (3 mo) and Diabetes    This is a 67-year-old female who presents to the clinic for three-month follow-up appointment for diabetes management.  Patient also is here for preoperative clearance for right total knee replacement with Dr. Weaver on January 3, 2023.     Follow-up  Associated symptoms include arthralgias, joint swelling and myalgias.   Diabetes    Review of Systems   Constitutional: Negative.    HENT: Negative.     Eyes: Negative.    Respiratory: Negative.     Cardiovascular: Negative.    Gastrointestinal: Negative.    Endocrine: Negative.    Genitourinary: Negative.    Musculoskeletal:  Positive for arthralgias, joint swelling and myalgias.   Integumentary:  Negative.   Allergic/Immunologic: Negative.    Neurological: Negative.    Hematological: Negative.    Psychiatric/Behavioral: Negative.           Objective:      Physical Exam  Constitutional:       Appearance: Normal appearance. She is obese.   HENT:      Head: Normocephalic.      Right Ear: Tympanic membrane, ear canal and external ear normal.      Left Ear: Tympanic membrane, ear canal and external ear normal.      Nose: Nose normal.      Mouth/Throat:      Mouth: Mucous membranes are moist.      Pharynx: Oropharynx is clear.   Eyes:      Extraocular Movements: Extraocular movements intact.      Conjunctiva/sclera: Conjunctivae normal.      Pupils: Pupils are equal, round, and reactive to light.   Cardiovascular:      Rate and Rhythm: Normal rate and regular rhythm.      Pulses: Normal pulses.      Heart sounds: Normal heart sounds.   Pulmonary:      Effort: Pulmonary effort is normal.      Breath sounds: Normal breath sounds.   Abdominal:      General: Bowel sounds are normal.      Palpations: Abdomen is soft.   Musculoskeletal:      Cervical back: Normal range of motion  and neck supple.      Right knee: Swelling present. Decreased range of motion. Tenderness present.   Skin:     General: Skin is warm and dry.   Neurological:      General: No focal deficit present.      Mental Status: She is alert and oriented to person, place, and time. Mental status is at baseline.   Psychiatric:         Mood and Affect: Mood normal.         Behavior: Behavior normal.         Thought Content: Thought content normal.         Judgment: Judgment normal.       Assessment & Plan:   1. Type 2 diabetes mellitus without complication, unspecified whether long term insulin use  Assessment & Plan:  Hemoglobin A1c 7.1.  Patient will repeat A1c on 12/26 prior to surgery.  ADA diet advised.  Monitor blood glucose at least once daily.  Notify clinic if blood glucose is greater than 150.      2. Preoperative clearance  Assessment & Plan:  Repeat labs on 12/26 as ordered by Ortho.  Patient is cleared for right knee replacement.            Follow up in about 3 months (around 3/19/2023), or if symptoms worsen or fail to improve. In addition to their scheduled follow up, the patient has also been instructed to follow up on as needed basis.

## 2022-12-19 NOTE — ASSESSMENT & PLAN NOTE
Hemoglobin A1c 7.1.  Patient will repeat A1c on 12/26 prior to surgery.  ADA diet advised.  Monitor blood glucose at least once daily.  Notify clinic if blood glucose is greater than 150.

## 2022-12-22 ENCOUNTER — TELEPHONE (OUTPATIENT)
Dept: PREADMISSION TESTING | Facility: HOSPITAL | Age: 67
End: 2022-12-22
Payer: MEDICARE

## 2022-12-22 DIAGNOSIS — R73.9 HYPERGLYCEMIA: Primary | ICD-10-CM

## 2022-12-22 NOTE — TELEPHONE ENCOUNTER
Spoke to patient. Placed order for A1C. Pt verbalized understanding to come get lab work for A1C on Tuesday.

## 2022-12-22 NOTE — TELEPHONE ENCOUNTER
Could you please reach out to this patient?  She said she was told she had to come in on Tuesday, Dec 27th for repeat A1C and UA.  I checked with our lab.  There are no orders for repeat A1C or UA.  Could you please order these (if indeed she is suppose to have this done on Tuesday)? Thanks and Nohemy Schaefer

## 2022-12-27 ENCOUNTER — LAB VISIT (OUTPATIENT)
Dept: LAB | Facility: HOSPITAL | Age: 67
End: 2022-12-27
Attending: ORTHOPAEDIC SURGERY
Payer: MEDICARE

## 2022-12-27 DIAGNOSIS — R73.9 HYPERGLYCEMIA: ICD-10-CM

## 2022-12-27 LAB
EST. AVERAGE GLUCOSE BLD GHB EST-MCNC: 157.1 MG/DL
HBA1C MFR BLD: 7.1 %

## 2022-12-27 PROCEDURE — 83036 HEMOGLOBIN GLYCOSYLATED A1C: CPT

## 2022-12-27 PROCEDURE — 36415 COLL VENOUS BLD VENIPUNCTURE: CPT

## 2022-12-28 ENCOUNTER — ANESTHESIA EVENT (OUTPATIENT)
Dept: SURGERY | Facility: HOSPITAL | Age: 67
End: 2022-12-28
Payer: MEDICARE

## 2023-01-02 ENCOUNTER — PATIENT MESSAGE (OUTPATIENT)
Dept: ADMINISTRATIVE | Facility: OTHER | Age: 68
End: 2023-01-02
Payer: MEDICARE

## 2023-01-03 ENCOUNTER — HOSPITAL ENCOUNTER (OUTPATIENT)
Facility: HOSPITAL | Age: 68
Discharge: HOME | End: 2023-01-05
Attending: ORTHOPAEDIC SURGERY | Admitting: ORTHOPAEDIC SURGERY
Payer: MEDICARE

## 2023-01-03 ENCOUNTER — ANESTHESIA (OUTPATIENT)
Dept: SURGERY | Facility: HOSPITAL | Age: 68
End: 2023-01-03
Payer: MEDICARE

## 2023-01-03 DIAGNOSIS — M17.11 OSTEOARTHRITIS OF RIGHT KNEE: ICD-10-CM

## 2023-01-03 DIAGNOSIS — M17.11 PRIMARY OSTEOARTHRITIS OF RIGHT KNEE: ICD-10-CM

## 2023-01-03 DIAGNOSIS — Z01.818 PRE-OP TESTING: ICD-10-CM

## 2023-01-03 LAB
HCT VFR BLD AUTO: 42.6 % (ref 37–47)
HGB BLD-MCNC: 13.3 GM/DL (ref 12–16)
POCT GLUCOSE: 108 MG/DL (ref 70–110)
POCT GLUCOSE: 109 MG/DL (ref 70–110)

## 2023-01-03 PROCEDURE — 36415 COLL VENOUS BLD VENIPUNCTURE: CPT

## 2023-01-03 PROCEDURE — 96376 TX/PRO/DX INJ SAME DRUG ADON: CPT

## 2023-01-03 PROCEDURE — 94799 UNLISTED PULMONARY SVC/PX: CPT

## 2023-01-03 PROCEDURE — 20985 PR CPTR-ASST SURGICAL NAVIGATION IMAGE-LESS: ICD-10-PCS | Mod: RT,,, | Performed by: ORTHOPAEDIC SURGERY

## 2023-01-03 PROCEDURE — 25000003 PHARM REV CODE 250: Performed by: ORTHOPAEDIC SURGERY

## 2023-01-03 PROCEDURE — 27447 TOTAL KNEE ARTHROPLASTY: CPT | Mod: AS,RT,,

## 2023-01-03 PROCEDURE — 30000890 SPECIMEN TO PATHOLOGY: Performed by: ORTHOPAEDIC SURGERY

## 2023-01-03 PROCEDURE — 63600175 PHARM REV CODE 636 W HCPCS

## 2023-01-03 PROCEDURE — 99900031 HC PATIENT EDUCATION (STAT)

## 2023-01-03 PROCEDURE — 88305 TISSUE EXAM BY PATHOLOGIST: CPT | Performed by: ORTHOPAEDIC SURGERY

## 2023-01-03 PROCEDURE — C1776 JOINT DEVICE (IMPLANTABLE): HCPCS | Performed by: ORTHOPAEDIC SURGERY

## 2023-01-03 PROCEDURE — G0378 HOSPITAL OBSERVATION PER HR: HCPCS

## 2023-01-03 PROCEDURE — 71000033 HC RECOVERY, INTIAL HOUR: Performed by: ORTHOPAEDIC SURGERY

## 2023-01-03 PROCEDURE — 37000008 HC ANESTHESIA 1ST 15 MINUTES: Performed by: ORTHOPAEDIC SURGERY

## 2023-01-03 PROCEDURE — 71000039 HC RECOVERY, EACH ADD'L HOUR: Performed by: ORTHOPAEDIC SURGERY

## 2023-01-03 PROCEDURE — 37000009 HC ANESTHESIA EA ADD 15 MINS: Performed by: ORTHOPAEDIC SURGERY

## 2023-01-03 PROCEDURE — 97162 PT EVAL MOD COMPLEX 30 MIN: CPT

## 2023-01-03 PROCEDURE — 25000003 PHARM REV CODE 250: Performed by: ANESTHESIOLOGY

## 2023-01-03 PROCEDURE — 27447 PR TOTAL KNEE ARTHROPLASTY: ICD-10-PCS | Mod: RT,,, | Performed by: ORTHOPAEDIC SURGERY

## 2023-01-03 PROCEDURE — 85014 HEMATOCRIT: CPT

## 2023-01-03 PROCEDURE — 27800903 OPTIME MED/SURG SUP & DEVICES OTHER IMPLANTS: Performed by: ORTHOPAEDIC SURGERY

## 2023-01-03 PROCEDURE — 27447 TOTAL KNEE ARTHROPLASTY: CPT | Mod: RT,,, | Performed by: ORTHOPAEDIC SURGERY

## 2023-01-03 PROCEDURE — 27447 PR TOTAL KNEE ARTHROPLASTY: ICD-10-PCS | Mod: AS,RT,,

## 2023-01-03 PROCEDURE — 96366 THER/PROPH/DIAG IV INF ADDON: CPT

## 2023-01-03 PROCEDURE — 51798 US URINE CAPACITY MEASURE: CPT | Performed by: ORTHOPAEDIC SURGERY

## 2023-01-03 PROCEDURE — 25000003 PHARM REV CODE 250

## 2023-01-03 PROCEDURE — 27201423 OPTIME MED/SURG SUP & DEVICES STERILE SUPPLY: Performed by: ORTHOPAEDIC SURGERY

## 2023-01-03 PROCEDURE — 63600175 PHARM REV CODE 636 W HCPCS: Performed by: ORTHOPAEDIC SURGERY

## 2023-01-03 PROCEDURE — 25000003 PHARM REV CODE 250: Performed by: NURSE ANESTHETIST, CERTIFIED REGISTERED

## 2023-01-03 PROCEDURE — 63600175 PHARM REV CODE 636 W HCPCS: Performed by: NURSE ANESTHETIST, CERTIFIED REGISTERED

## 2023-01-03 PROCEDURE — 96375 TX/PRO/DX INJ NEW DRUG ADDON: CPT

## 2023-01-03 PROCEDURE — 36000712 HC OR TIME LEV V 1ST 15 MIN: Performed by: ORTHOPAEDIC SURGERY

## 2023-01-03 PROCEDURE — 51701 INSERT BLADDER CATHETER: CPT

## 2023-01-03 PROCEDURE — 51798 US URINE CAPACITY MEASURE: CPT

## 2023-01-03 PROCEDURE — 82962 GLUCOSE BLOOD TEST: CPT | Performed by: ORTHOPAEDIC SURGERY

## 2023-01-03 PROCEDURE — 30000890 HC MISC. SEND OUT TEST: Performed by: ORTHOPAEDIC SURGERY

## 2023-01-03 PROCEDURE — 20985 CPTR-ASST DIR MS PX: CPT | Mod: RT,,, | Performed by: ORTHOPAEDIC SURGERY

## 2023-01-03 PROCEDURE — 36000713 HC OR TIME LEV V EA ADD 15 MIN: Performed by: ORTHOPAEDIC SURGERY

## 2023-01-03 PROCEDURE — A4216 STERILE WATER/SALINE, 10 ML: HCPCS | Performed by: ORTHOPAEDIC SURGERY

## 2023-01-03 PROCEDURE — 94761 N-INVAS EAR/PLS OXIMETRY MLT: CPT

## 2023-01-03 PROCEDURE — 96372 THER/PROPH/DIAG INJ SC/IM: CPT | Mod: 59

## 2023-01-03 PROCEDURE — 96365 THER/PROPH/DIAG IV INF INIT: CPT | Mod: 59

## 2023-01-03 DEVICE — CRUCIATE RETAINING FEMORAL
Type: IMPLANTABLE DEVICE | Site: KNEE | Status: FUNCTIONAL
Brand: TRIATHLON

## 2023-01-03 DEVICE — TIBIAL COMPONENT
Type: IMPLANTABLE DEVICE | Site: KNEE | Status: FUNCTIONAL
Brand: TRIATHLON

## 2023-01-03 DEVICE — PATELLA
Type: IMPLANTABLE DEVICE | Site: KNEE | Status: FUNCTIONAL
Brand: TRIATHLON

## 2023-01-03 DEVICE — TIBIAL BEARING INSERT - CS
Type: IMPLANTABLE DEVICE | Site: KNEE | Status: FUNCTIONAL
Brand: TRIATHLON

## 2023-01-03 RX ORDER — HYDROCODONE BITARTRATE AND ACETAMINOPHEN 5; 325 MG/1; MG/1
1 TABLET ORAL
Status: DISCONTINUED | OUTPATIENT
Start: 2023-01-03 | End: 2023-01-03

## 2023-01-03 RX ORDER — LISINOPRIL 10 MG/1
10 TABLET ORAL DAILY
Status: DISCONTINUED | OUTPATIENT
Start: 2023-01-04 | End: 2023-01-05 | Stop reason: HOSPADM

## 2023-01-03 RX ORDER — BUPIVACAINE HYDROCHLORIDE 5 MG/ML
INJECTION, SOLUTION EPIDURAL; INTRACAUDAL
Status: COMPLETED | OUTPATIENT
Start: 2023-01-03 | End: 2023-01-03

## 2023-01-03 RX ORDER — GABAPENTIN 300 MG/1
300 CAPSULE ORAL NIGHTLY
Status: DISCONTINUED | OUTPATIENT
Start: 2023-01-03 | End: 2023-01-05 | Stop reason: HOSPADM

## 2023-01-03 RX ORDER — PHENYLEPHRINE HCL IN 0.9% NACL 1 MG/10 ML
SYRINGE (ML) INTRAVENOUS
Status: DISCONTINUED | OUTPATIENT
Start: 2023-01-03 | End: 2023-01-03

## 2023-01-03 RX ORDER — BUPIVACAINE HYDROCHLORIDE 5 MG/ML
INJECTION, SOLUTION EPIDURAL; INTRACAUDAL
Status: COMPLETED
Start: 2023-01-03 | End: 2023-01-03

## 2023-01-03 RX ORDER — ONDANSETRON 2 MG/ML
INJECTION INTRAMUSCULAR; INTRAVENOUS
Status: DISCONTINUED | OUTPATIENT
Start: 2023-01-03 | End: 2023-01-03

## 2023-01-03 RX ORDER — MORPHINE SULFATE 4 MG/ML
4 INJECTION, SOLUTION INTRAMUSCULAR; INTRAVENOUS
Status: ACTIVE | OUTPATIENT
Start: 2023-01-03 | End: 2023-01-04

## 2023-01-03 RX ORDER — KETOROLAC TROMETHAMINE 30 MG/ML
INJECTION, SOLUTION INTRAMUSCULAR; INTRAVENOUS
Status: DISCONTINUED | OUTPATIENT
Start: 2023-01-03 | End: 2023-01-03 | Stop reason: HOSPADM

## 2023-01-03 RX ORDER — KETOROLAC TROMETHAMINE 10 MG/1
10 TABLET, FILM COATED ORAL EVERY 6 HOURS
Status: DISCONTINUED | OUTPATIENT
Start: 2023-01-03 | End: 2023-01-03

## 2023-01-03 RX ORDER — PROPOFOL 10 MG/ML
VIAL (ML) INTRAVENOUS
Status: DISCONTINUED | OUTPATIENT
Start: 2023-01-03 | End: 2023-01-03

## 2023-01-03 RX ORDER — HYDROCODONE BITARTRATE AND ACETAMINOPHEN 5; 325 MG/1; MG/1
1 TABLET ORAL EVERY 4 HOURS PRN
Status: DISCONTINUED | OUTPATIENT
Start: 2023-01-03 | End: 2023-01-05

## 2023-01-03 RX ORDER — IBUPROFEN 200 MG
16 TABLET ORAL
Status: DISCONTINUED | OUTPATIENT
Start: 2023-01-03 | End: 2023-01-05 | Stop reason: HOSPADM

## 2023-01-03 RX ORDER — SODIUM CHLORIDE 9 MG/ML
INJECTION, SOLUTION INTRAMUSCULAR; INTRAVENOUS; SUBCUTANEOUS
Status: DISCONTINUED | OUTPATIENT
Start: 2023-01-03 | End: 2023-01-03 | Stop reason: HOSPADM

## 2023-01-03 RX ORDER — MEPERIDINE HYDROCHLORIDE 25 MG/ML
6.25 INJECTION INTRAMUSCULAR; INTRAVENOUS; SUBCUTANEOUS ONCE AS NEEDED
Status: DISCONTINUED | OUTPATIENT
Start: 2023-01-03 | End: 2023-01-03

## 2023-01-03 RX ORDER — INSULIN ASPART 100 [IU]/ML
1-10 INJECTION, SOLUTION INTRAVENOUS; SUBCUTANEOUS
Status: DISCONTINUED | OUTPATIENT
Start: 2023-01-03 | End: 2023-01-05 | Stop reason: HOSPADM

## 2023-01-03 RX ORDER — GLYCOPYRROLATE 0.2 MG/ML
INJECTION INTRAMUSCULAR; INTRAVENOUS
Status: DISCONTINUED | OUTPATIENT
Start: 2023-01-03 | End: 2023-01-03

## 2023-01-03 RX ORDER — DOCUSATE SODIUM 100 MG/1
200 CAPSULE, LIQUID FILLED ORAL DAILY
Status: DISCONTINUED | OUTPATIENT
Start: 2023-01-04 | End: 2023-01-05 | Stop reason: HOSPADM

## 2023-01-03 RX ORDER — MORPHINE SULFATE 10 MG/ML
INJECTION INTRAMUSCULAR; INTRAVENOUS; SUBCUTANEOUS
Status: DISCONTINUED
Start: 2023-01-03 | End: 2023-01-03 | Stop reason: WASHOUT

## 2023-01-03 RX ORDER — ROPIVACAINE HYDROCHLORIDE 5 MG/ML
INJECTION, SOLUTION EPIDURAL; INFILTRATION; PERINEURAL
Status: DISPENSED
Start: 2023-01-03 | End: 2023-01-03

## 2023-01-03 RX ORDER — EPHEDRINE SULFATE 50 MG/ML
INJECTION, SOLUTION INTRAVENOUS
Status: DISCONTINUED | OUTPATIENT
Start: 2023-01-03 | End: 2023-01-03

## 2023-01-03 RX ORDER — GLUCAGON 1 MG
1 KIT INJECTION
Status: DISCONTINUED | OUTPATIENT
Start: 2023-01-03 | End: 2023-01-05 | Stop reason: HOSPADM

## 2023-01-03 RX ORDER — NAPROXEN SODIUM 220 MG/1
81 TABLET, FILM COATED ORAL 2 TIMES DAILY
Status: DISCONTINUED | OUTPATIENT
Start: 2023-01-04 | End: 2023-01-05 | Stop reason: HOSPADM

## 2023-01-03 RX ORDER — OXYCODONE HYDROCHLORIDE 5 MG/1
5 TABLET ORAL EVERY 4 HOURS PRN
Status: DISCONTINUED | OUTPATIENT
Start: 2023-01-03 | End: 2023-01-05

## 2023-01-03 RX ORDER — MIDAZOLAM HYDROCHLORIDE 1 MG/ML
INJECTION INTRAMUSCULAR; INTRAVENOUS
Status: DISCONTINUED | OUTPATIENT
Start: 2023-01-03 | End: 2023-01-03

## 2023-01-03 RX ORDER — LACTULOSE 10 G/15ML
20 SOLUTION ORAL EVERY 6 HOURS PRN
Status: DISCONTINUED | OUTPATIENT
Start: 2023-01-03 | End: 2023-01-05 | Stop reason: HOSPADM

## 2023-01-03 RX ORDER — ACETAMINOPHEN 10 MG/ML
INJECTION, SOLUTION INTRAVENOUS
Status: DISPENSED
Start: 2023-01-03 | End: 2023-01-04

## 2023-01-03 RX ORDER — FENTANYL CITRATE 50 UG/ML
INJECTION, SOLUTION INTRAMUSCULAR; INTRAVENOUS
Status: DISCONTINUED | OUTPATIENT
Start: 2023-01-03 | End: 2023-01-03

## 2023-01-03 RX ORDER — DEXTROSE 50 % IN WATER (D50W) INTRAVENOUS SYRINGE
12.5
Status: DISCONTINUED | OUTPATIENT
Start: 2023-01-03 | End: 2023-01-05 | Stop reason: HOSPADM

## 2023-01-03 RX ORDER — DEXTROSE 50 % IN WATER (D50W) INTRAVENOUS SYRINGE
25
Status: DISCONTINUED | OUTPATIENT
Start: 2023-01-03 | End: 2023-01-05 | Stop reason: HOSPADM

## 2023-01-03 RX ORDER — FAMOTIDINE 20 MG/1
20 TABLET, FILM COATED ORAL 2 TIMES DAILY
Status: DISCONTINUED | OUTPATIENT
Start: 2023-01-03 | End: 2023-01-05 | Stop reason: HOSPADM

## 2023-01-03 RX ORDER — SODIUM CHLORIDE 0.9 % (FLUSH) 0.9 %
SYRINGE (ML) INJECTION
Status: DISPENSED
Start: 2023-01-03 | End: 2023-01-03

## 2023-01-03 RX ORDER — KETOROLAC TROMETHAMINE 30 MG/ML
INJECTION, SOLUTION INTRAMUSCULAR; INTRAVENOUS
Status: COMPLETED
Start: 2023-01-03 | End: 2023-01-03

## 2023-01-03 RX ORDER — DEXAMETHASONE SODIUM PHOSPHATE 4 MG/ML
INJECTION, SOLUTION INTRA-ARTICULAR; INTRALESIONAL; INTRAMUSCULAR; INTRAVENOUS; SOFT TISSUE
Status: DISCONTINUED | OUTPATIENT
Start: 2023-01-03 | End: 2023-01-03

## 2023-01-03 RX ORDER — ONDANSETRON 2 MG/ML
4 INJECTION INTRAMUSCULAR; INTRAVENOUS EVERY 6 HOURS PRN
Status: DISCONTINUED | OUTPATIENT
Start: 2023-01-03 | End: 2023-01-05 | Stop reason: HOSPADM

## 2023-01-03 RX ORDER — SCOLOPAMINE TRANSDERMAL SYSTEM 1 MG/1
1 PATCH, EXTENDED RELEASE TRANSDERMAL ONCE AS NEEDED
Status: DISCONTINUED | OUTPATIENT
Start: 2023-01-03 | End: 2023-01-03

## 2023-01-03 RX ORDER — TRAMADOL HYDROCHLORIDE 50 MG/1
50 TABLET ORAL EVERY 4 HOURS PRN
Status: DISCONTINUED | OUTPATIENT
Start: 2023-01-03 | End: 2023-01-05

## 2023-01-03 RX ORDER — ROPIVACAINE HYDROCHLORIDE 5 MG/ML
INJECTION, SOLUTION EPIDURAL; INFILTRATION; PERINEURAL
Status: DISCONTINUED | OUTPATIENT
Start: 2023-01-03 | End: 2023-01-03 | Stop reason: HOSPADM

## 2023-01-03 RX ORDER — ACETAMINOPHEN 10 MG/ML
1000 INJECTION, SOLUTION INTRAVENOUS ONCE
Status: COMPLETED | OUTPATIENT
Start: 2023-01-03 | End: 2023-01-03

## 2023-01-03 RX ORDER — MAG HYDROX/ALUMINUM HYD/SIMETH 200-200-20
30 SUSPENSION, ORAL (FINAL DOSE FORM) ORAL EVERY 6 HOURS PRN
Status: DISCONTINUED | OUTPATIENT
Start: 2023-01-03 | End: 2023-01-05 | Stop reason: HOSPADM

## 2023-01-03 RX ORDER — IBUPROFEN 200 MG
24 TABLET ORAL
Status: DISCONTINUED | OUTPATIENT
Start: 2023-01-03 | End: 2023-01-05 | Stop reason: HOSPADM

## 2023-01-03 RX ORDER — MIDAZOLAM HYDROCHLORIDE 1 MG/ML
2 INJECTION INTRAMUSCULAR; INTRAVENOUS ONCE AS NEEDED
Status: DISCONTINUED | OUTPATIENT
Start: 2023-01-03 | End: 2023-01-03

## 2023-01-03 RX ORDER — TRANEXAMIC ACID 650 MG/1
1950 TABLET ORAL
Status: COMPLETED | OUTPATIENT
Start: 2023-01-03 | End: 2023-01-03

## 2023-01-03 RX ORDER — HYDROMORPHONE HYDROCHLORIDE 2 MG/ML
0.4 INJECTION, SOLUTION INTRAMUSCULAR; INTRAVENOUS; SUBCUTANEOUS EVERY 5 MIN PRN
Status: DISCONTINUED | OUTPATIENT
Start: 2023-01-03 | End: 2023-01-03

## 2023-01-03 RX ORDER — GABAPENTIN 300 MG/1
600 CAPSULE ORAL
Status: COMPLETED | OUTPATIENT
Start: 2023-01-03 | End: 2023-01-03

## 2023-01-03 RX ORDER — POLYETHYLENE GLYCOL 3350 17 G/17G
17 POWDER, FOR SOLUTION ORAL NIGHTLY
Status: DISCONTINUED | OUTPATIENT
Start: 2023-01-03 | End: 2023-01-05 | Stop reason: HOSPADM

## 2023-01-03 RX ORDER — ONDANSETRON 2 MG/ML
4 INJECTION INTRAMUSCULAR; INTRAVENOUS DAILY PRN
Status: DISCONTINUED | OUTPATIENT
Start: 2023-01-03 | End: 2023-01-05 | Stop reason: HOSPADM

## 2023-01-03 RX ORDER — EPINEPHRINE 1 MG/ML
INJECTION, SOLUTION, CONCENTRATE INTRAVENOUS
Status: DISCONTINUED | OUTPATIENT
Start: 2023-01-03 | End: 2023-01-03 | Stop reason: HOSPADM

## 2023-01-03 RX ORDER — PROCHLORPERAZINE EDISYLATE 5 MG/ML
5 INJECTION INTRAMUSCULAR; INTRAVENOUS EVERY 30 MIN PRN
Status: DISCONTINUED | OUTPATIENT
Start: 2023-01-03 | End: 2023-01-03

## 2023-01-03 RX ORDER — CEFAZOLIN SODIUM 2 G/100ML
2 INJECTION, SOLUTION INTRAVENOUS
Status: COMPLETED | OUTPATIENT
Start: 2023-01-03 | End: 2023-01-03

## 2023-01-03 RX ORDER — KETOROLAC TROMETHAMINE 10 MG/1
10 TABLET, FILM COATED ORAL EVERY 6 HOURS
Status: DISCONTINUED | OUTPATIENT
Start: 2023-01-03 | End: 2023-01-05 | Stop reason: HOSPADM

## 2023-01-03 RX ORDER — METOCLOPRAMIDE HYDROCHLORIDE 5 MG/ML
10 INJECTION INTRAMUSCULAR; INTRAVENOUS
Status: COMPLETED | OUTPATIENT
Start: 2023-01-03 | End: 2023-01-04

## 2023-01-03 RX ORDER — LIDOCAINE HYDROCHLORIDE 10 MG/ML
INJECTION, SOLUTION EPIDURAL; INFILTRATION; INTRACAUDAL; PERINEURAL
Status: DISCONTINUED | OUTPATIENT
Start: 2023-01-03 | End: 2023-01-03

## 2023-01-03 RX ORDER — SODIUM CHLORIDE 9 MG/ML
INJECTION, SOLUTION INTRAVENOUS CONTINUOUS
Status: DISCONTINUED | OUTPATIENT
Start: 2023-01-03 | End: 2023-01-05 | Stop reason: HOSPADM

## 2023-01-03 RX ORDER — SODIUM CHLORIDE, SODIUM GLUCONATE, SODIUM ACETATE, POTASSIUM CHLORIDE AND MAGNESIUM CHLORIDE 30; 37; 368; 526; 502 MG/100ML; MG/100ML; MG/100ML; MG/100ML; MG/100ML
250 INJECTION, SOLUTION INTRAVENOUS CONTINUOUS
Status: DISCONTINUED | OUTPATIENT
Start: 2023-01-03 | End: 2023-01-03

## 2023-01-03 RX ORDER — SODIUM CHLORIDE, SODIUM GLUCONATE, SODIUM ACETATE, POTASSIUM CHLORIDE AND MAGNESIUM CHLORIDE 30; 37; 368; 526; 502 MG/100ML; MG/100ML; MG/100ML; MG/100ML; MG/100ML
INJECTION, SOLUTION INTRAVENOUS CONTINUOUS
Status: DISCONTINUED | OUTPATIENT
Start: 2023-01-03 | End: 2023-01-03

## 2023-01-03 RX ORDER — TALC
6 POWDER (GRAM) TOPICAL NIGHTLY PRN
Status: DISCONTINUED | OUTPATIENT
Start: 2023-01-03 | End: 2023-01-05 | Stop reason: HOSPADM

## 2023-01-03 RX ORDER — AMOXICILLIN 250 MG
2 CAPSULE ORAL 2 TIMES DAILY
Status: DISCONTINUED | OUTPATIENT
Start: 2023-01-03 | End: 2023-01-05 | Stop reason: HOSPADM

## 2023-01-03 RX ORDER — CEFAZOLIN SODIUM 2 G/100ML
2 INJECTION, SOLUTION INTRAVENOUS
Status: COMPLETED | OUTPATIENT
Start: 2023-01-03 | End: 2023-01-04

## 2023-01-03 RX ORDER — LANOLIN ALCOHOL/MO/W.PET/CERES
1 CREAM (GRAM) TOPICAL
Status: DISCONTINUED | OUTPATIENT
Start: 2023-01-04 | End: 2023-01-05 | Stop reason: HOSPADM

## 2023-01-03 RX ORDER — ACETAMINOPHEN 500 MG
1000 TABLET ORAL
Status: COMPLETED | OUTPATIENT
Start: 2023-01-03 | End: 2023-01-03

## 2023-01-03 RX ORDER — METHOCARBAMOL 750 MG/1
750 TABLET, FILM COATED ORAL EVERY 8 HOURS PRN
Status: DISCONTINUED | OUTPATIENT
Start: 2023-01-03 | End: 2023-01-05 | Stop reason: HOSPADM

## 2023-01-03 RX ORDER — BISACODYL 10 MG
10 SUPPOSITORY, RECTAL RECTAL DAILY
Status: DISCONTINUED | OUTPATIENT
Start: 2023-01-06 | End: 2023-01-05 | Stop reason: HOSPADM

## 2023-01-03 RX ORDER — SODIUM CHLORIDE, SODIUM LACTATE, POTASSIUM CHLORIDE, CALCIUM CHLORIDE 600; 310; 30; 20 MG/100ML; MG/100ML; MG/100ML; MG/100ML
INJECTION, SOLUTION INTRAVENOUS CONTINUOUS
Status: DISCONTINUED | OUTPATIENT
Start: 2023-01-03 | End: 2023-01-03

## 2023-01-03 RX ORDER — EPINEPHRINE 1 MG/ML
INJECTION, SOLUTION, CONCENTRATE INTRAVENOUS
Status: DISPENSED
Start: 2023-01-03 | End: 2023-01-03

## 2023-01-03 RX ORDER — KETOROLAC TROMETHAMINE 30 MG/ML
INJECTION, SOLUTION INTRAMUSCULAR; INTRAVENOUS
Status: DISPENSED
Start: 2023-01-03 | End: 2023-01-04

## 2023-01-03 RX ADMIN — LIDOCAINE HYDROCHLORIDE 50 MG: 10 INJECTION, SOLUTION EPIDURAL; INFILTRATION; INTRACAUDAL; PERINEURAL at 12:01

## 2023-01-03 RX ADMIN — KETOROLAC TROMETHAMINE 30 MG: 30 INJECTION, SOLUTION INTRAMUSCULAR at 02:01

## 2023-01-03 RX ADMIN — PROPOFOL 20 MG: 10 INJECTION, EMULSION INTRAVENOUS at 12:01

## 2023-01-03 RX ADMIN — SODIUM CHLORIDE, SODIUM GLUCONATE, SODIUM ACETATE, POTASSIUM CHLORIDE AND MAGNESIUM CHLORIDE: 526; 502; 368; 37; 30 INJECTION, SOLUTION INTRAVENOUS at 11:01

## 2023-01-03 RX ADMIN — ONDANSETRON HYDROCHLORIDE 4 MG: 2 SOLUTION INTRAMUSCULAR; INTRAVENOUS at 11:01

## 2023-01-03 RX ADMIN — GABAPENTIN 600 MG: 300 CAPSULE ORAL at 08:01

## 2023-01-03 RX ADMIN — TRAMADOL HYDROCHLORIDE 50 MG: 50 TABLET, COATED ORAL at 08:01

## 2023-01-03 RX ADMIN — METHOCARBAMOL 750 MG: 750 TABLET ORAL at 05:01

## 2023-01-03 RX ADMIN — INSULIN ASPART 3 UNITS: 100 INJECTION, SOLUTION INTRAVENOUS; SUBCUTANEOUS at 08:01

## 2023-01-03 RX ADMIN — NORETHINDRONE AND ETHINYL ESTRADIOL 10 MG: KIT ORAL at 12:01

## 2023-01-03 RX ADMIN — NORETHINDRONE AND ETHINYL ESTRADIOL 10 MG: KIT ORAL at 01:01

## 2023-01-03 RX ADMIN — FAMOTIDINE 20 MG: 20 TABLET ORAL at 09:01

## 2023-01-03 RX ADMIN — MIDAZOLAM 2 MG: 1 INJECTION INTRAMUSCULAR; INTRAVENOUS at 11:01

## 2023-01-03 RX ADMIN — METOCLOPRAMIDE 10 MG: 5 INJECTION, SOLUTION INTRAMUSCULAR; INTRAVENOUS at 04:01

## 2023-01-03 RX ADMIN — Medication 100 MCG: at 12:01

## 2023-01-03 RX ADMIN — Medication 100 MCG: at 01:01

## 2023-01-03 RX ADMIN — KETOROLAC TROMETHAMINE 10 MG: 10 TABLET, FILM COATED ORAL at 08:01

## 2023-01-03 RX ADMIN — FENTANYL CITRATE 50 MCG: 50 INJECTION, SOLUTION INTRAMUSCULAR; INTRAVENOUS at 01:01

## 2023-01-03 RX ADMIN — LEVOTHYROXINE SODIUM 150 MCG: 25 TABLET ORAL at 09:01

## 2023-01-03 RX ADMIN — METOCLOPRAMIDE 10 MG: 5 INJECTION, SOLUTION INTRAMUSCULAR; INTRAVENOUS at 10:01

## 2023-01-03 RX ADMIN — DEXAMETHASONE SODIUM PHOSPHATE 4 MG: 4 INJECTION, SOLUTION INTRA-ARTICULAR; INTRALESIONAL; INTRAMUSCULAR; INTRAVENOUS; SOFT TISSUE at 12:01

## 2023-01-03 RX ADMIN — TRANEXAMIC ACID 1950 MG: 650 TABLET ORAL at 08:01

## 2023-01-03 RX ADMIN — CEFAZOLIN SODIUM 2000 MG: 2 INJECTION, SOLUTION INTRAVENOUS at 04:01

## 2023-01-03 RX ADMIN — MIDAZOLAM 1 MG: 1 INJECTION INTRAMUSCULAR; INTRAVENOUS at 11:01

## 2023-01-03 RX ADMIN — TRAMADOL HYDROCHLORIDE 50 MG: 50 TABLET, COATED ORAL at 04:01

## 2023-01-03 RX ADMIN — SODIUM CHLORIDE, SODIUM GLUCONATE, SODIUM ACETATE, POTASSIUM CHLORIDE AND MAGNESIUM CHLORIDE: 526; 502; 368; 37; 30 INJECTION, SOLUTION INTRAVENOUS at 12:01

## 2023-01-03 RX ADMIN — ACETAMINOPHEN 1000 MG: 10 INJECTION INTRAVENOUS at 02:01

## 2023-01-03 RX ADMIN — BUPIVACAINE HYDROCHLORIDE 20 ML: 5 INJECTION, SOLUTION EPIDURAL; INTRACAUDAL; PERINEURAL at 12:01

## 2023-01-03 RX ADMIN — MIDAZOLAM 1 MG: 1 INJECTION INTRAMUSCULAR; INTRAVENOUS at 12:01

## 2023-01-03 RX ADMIN — GLYCOPYRROLATE 0.2 MG: 0.2 INJECTION INTRAMUSCULAR; INTRAVENOUS at 12:01

## 2023-01-03 RX ADMIN — GABAPENTIN 300 MG: 300 CAPSULE ORAL at 08:01

## 2023-01-03 RX ADMIN — CEFAZOLIN SODIUM 2 G: 2 INJECTION, SOLUTION INTRAVENOUS at 12:01

## 2023-01-03 RX ADMIN — PROPOFOL 75 MCG/KG/MIN: 10 INJECTION, EMULSION INTRAVENOUS at 12:01

## 2023-01-03 RX ADMIN — CEFAZOLIN SODIUM 2000 MG: 2 INJECTION, SOLUTION INTRAVENOUS at 10:01

## 2023-01-03 RX ADMIN — ACETAMINOPHEN 1000 MG: 500 TABLET ORAL at 08:01

## 2023-01-03 RX ADMIN — SODIUM CHLORIDE, SODIUM GLUCONATE, SODIUM ACETATE, POTASSIUM CHLORIDE AND MAGNESIUM CHLORIDE: 526; 502; 368; 37; 30 INJECTION, SOLUTION INTRAVENOUS at 08:01

## 2023-01-03 NOTE — ANESTHESIA PROCEDURE NOTES
Epidural    Patient location during procedure: OR   Reason for block: primary anesthetic   Reason for block: at surgeon's request, labor analgesia requested by patient and obstetrician  Diagnosis: Osteoarthritis right knee     Staffing  Performing Provider: Cathi Wise MD  Authorizing Provider: Cathi Wise MD        Preanesthetic Checklist  Completed: patient identified, IV checked, site marked, risks and benefits discussed, surgical consent, monitors and equipment checked, pre-op evaluation, timeout performed, anesthesia consent given, hand hygiene performed and patient being monitored  Preparation  Patient position: sitting  Prep: ChloraPrep  Patient monitoring: Blood Pressure  Reason for block: primary anesthetic   Epidural  Skin Anesthetic: lidocaine 1%  Skin Wheal: 5 mL  Administration type: continuous  Approach: midline  Interspace: L3-4    Injection technique: AYAAN air  Needle and Epidural Catheter  Needle type: Tuohy   Needle gauge: 17  Needle length: 5.0 inches  Catheter type: multi-orifice  Catheter size: 18 G  Insertion Attempts: 1  Test dose: 4 mL  Additional Documentation: incremental injection, no paresthesia on injection, negative aspiration for heme and CSF and no signs/symptoms of IV or SA injection  Needle localization: anatomical landmarks  Assessment  Patient's tolerance of the procedure: comfortable throughout block  Additional Notes  Duplicate note unable to delete

## 2023-01-03 NOTE — BRIEF OP NOTE
[facility] - Periop Services  Brief Operative Note    SUMMARY     Surgery Date: 1/3/2023     Surgeon(s) and Role:     * Dev Weaver MD - Primary    Assisting Surgeon: None    Pre-op Diagnosis:  Primary osteoarthritis of right knee [M17.11]    Post-op Diagnosis:  Post-Op Diagnosis Codes:     * Primary osteoarthritis of right knee [M17.11]    Procedure(s) (LRB):  ROBOTIC ARTHROPLASTY, KNEE, TOTAL (Right)    Anesthesia: Spinal    Operative Findings: R TKA    Estimated Blood Loss: 100cc    Estimated Blood Loss has been documented.         Specimens:   Specimen (24h ago, onward)       Start     Ordered    01/03/23 1327  Specimen to Pathology  RELEASE UPON ORDERING        References:    Click here for ordering Quick Tip   Question:  Release to patient  Answer:  Immediate    01/03/23 1327    01/03/23 1319  Specimen to Pathology Orthopedics  Once        References:    Click here for ordering Quick Tip   Question Answer Comment   Procedure Type: Orthopedics    Specimen Source Knee, Right    Specimen Class: Routine/Screening    Clinical Information: ID    Release to patient Immediate        01/03/23 1319                    [id number]

## 2023-01-03 NOTE — ANESTHESIA POSTPROCEDURE EVALUATION
Anesthesia Post Evaluation    Patient: Rhoda Cesar    Procedure(s) Performed: Procedure(s) (LRB):  ROBOTIC ARTHROPLASTY, KNEE, TOTAL (Right)    Final Anesthesia Type: epidural      Patient location during evaluation: PACU  Patient participation: Yes- Able to Participate  Level of consciousness: awake and alert  Post-procedure vital signs: reviewed and stable  Pain management: adequate  Airway patency: patent    PONV status at discharge: No PONV  Anesthetic complications: no      Cardiovascular status: blood pressure returned to baseline and hemodynamically stable  Respiratory status: unassisted and spontaneous ventilation            Vitals Value Taken Time   /60 01/03/23 1400   Temp ** 01/03/23 1404   Pulse 76 01/03/23 1403   Resp 15 01/03/23 1403   SpO2 97 % 01/03/23 1403   Vitals shown include unvalidated device data.      No case tracking events are documented in the log.      Pain/Rfank Score: Pain Rating Prior to Med Admin: 0 (1/3/2023  8:37 AM)

## 2023-01-03 NOTE — ANESTHESIA PREPROCEDURE EVALUATION
01/03/2023  Rhoda Cesar is a 67 y.o., female with ----------------------------  Arthritis  Diabetes mellitus  Digestive disorder  Hypertension  Thyroid disease    And ----------------------------  Appendectomy  Cholecystectomy  Colonoscopy  Knee arthroscopy  Tubal ligation    Here for right TKA.      Pre-op Assessment    I have reviewed the NPO Status.      Review of Systems      Physical Exam  General: Well nourished, Cooperative, Alert and Oriented    Airway:  Mallampati: II   Mouth Opening: Normal  TM Distance: Normal  Tongue: Normal  Neck ROM: Normal ROM    Dental:  Intact    Chest/Lungs:  Clear to auscultation, Normal Respiratory Rate    Heart:  Rate: Normal  Rhythm: Regular Rhythm       Latest Reference Range & Units 12/12/22 11:08   Sodium 136 - 145 mmol/L 143   Potassium 3.5 - 5.1 mmol/L 4.0   Chloride 98 - 107 mmol/L 108 (H)   CO2 23 - 31 mmol/L 26   BUN 9.8 - 20.1 mg/dL 19.6   Creatinine 0.55 - 1.02 mg/dL 0.70   eGFR mls/min/1.73/m2 >60   Glucose 82 - 115 mg/dL 114   (H): Data is abnormally high    Anesthesia Plan  Type of Anesthesia, risks & benefits discussed:    Anesthesia Type: Spinal  Intra-op Monitoring Plan: Standard ASA Monitors  Post Op Pain Control Plan: multimodal analgesia  Informed Consent: Patient consented to blood products? Yes  ASA Score: 3  Day of Surgery Review of History & Physical: H&P Update referred to the surgeon/provider.  Anesthesia Plan Notes: ERAS ordered by surgeon according to [facility] protocol  Goal directed IV Fluid therapy  No schuler necessary unless hx of BPH/urinary retention     Ready For Surgery From Anesthesia Perspective.     .

## 2023-01-03 NOTE — ANESTHESIA PROCEDURE NOTES
Epidural    Patient location during procedure: OR   Reason for block: primary anesthetic   Reason for block: at surgeon's request  Diagnosis: Right Knee TKA   Start time: 1/3/2023 11:46 AM  End time: 1/3/2023 12:10 PM  Surgery related to: Osteoarthritis right knee    Staffing  Performing Provider: Cathi Wise MD  Authorizing Provider: Cathi Wise MD        Preanesthetic Checklist  Completed: patient identified, IV checked, site marked, risks and benefits discussed, surgical consent, monitors and equipment checked, pre-op evaluation, timeout performed, anesthesia consent given, hand hygiene performed and patient being monitored  Preparation  Patient position: sitting  Prep: ChloraPrep  Patient monitoring: Blood Pressure  Reason for block: primary anesthetic   Epidural  Skin Anesthetic: lidocaine 1%  Skin Wheal: 5 mL  Administration type: continuous  Approach: midline  Interspace: L3-4 (as well as L4-5)    Injection technique: AYAAN air  Needle and Epidural Catheter  Needle type: Tuohy   Needle gauge: 17  Needle length: 5.0 inches  Catheter type: multi-orifice  Catheter size: 18 G  Insertion Attempts: 2  Test dose: 4 mL  Additional Documentation: incremental injection, no paresthesia on injection, negative aspiration for heme and CSF, no signs/symptoms of IV or SA injection and no significant complaints from patient  Needle localization: anatomical landmarks  Assessment  Ease of block: moderate  Patient's tolerance of the procedure: comfortable throughout block and no complaints  Additional Notes  After spinal attempt with both short and long needle unsuccessful at two levels, epidural attempt at both L4-5 and L3-4 and successful AYAAN with needle nearly hubbed - 10mls of bupiv 0.5% through needle  - catheter threaded and 5 mls lidocaine 0.5% w/epi 1:200,000 test dose then 10mls bupiv 0.5% through catheter No inadvertent dural puncture with Tuohy.  Dural puncture not performed with spinal  needle    Medications:    Medications: bupivacaine (pf) (MARCAINE) injection 0.5% - Epidural   20 mL - 1/3/2023 12:10:00 PM

## 2023-01-03 NOTE — TRANSFER OF CARE
Anesthesia Transfer of Care Note    Patient: Rhoda Cesar    Procedure(s) Performed: Procedure(s) (LRB):  ROBOTIC ARTHROPLASTY, KNEE, TOTAL (Right)    Patient location: PACU    Anesthesia Type: epidural    Transport from OR: Transported from OR on room air with adequate spontaneous ventilation    Post pain: adequate analgesia    Post assessment: no apparent anesthetic complications and tolerated procedure well    Post vital signs: stable    Level of consciousness: awake, alert and oriented    Nausea/Vomiting: no nausea/vomiting    Complications: none    Transfer of care protocol was followed

## 2023-01-03 NOTE — PLAN OF CARE
Problem: Adult Inpatient Plan of Care  Goal: Plan of Care Review  Outcome: Ongoing, Progressing  Goal: Patient-Specific Goal (Individualized)  Outcome: Ongoing, Progressing  Goal: Absence of Hospital-Acquired Illness or Injury  Outcome: Ongoing, Progressing  Goal: Optimal Comfort and Wellbeing  Outcome: Ongoing, Progressing  Goal: Readiness for Transition of Care  Outcome: Ongoing, Progressing     Problem: Diabetes Comorbidity  Goal: Blood Glucose Level Within Targeted Range  Outcome: Ongoing, Progressing     Problem: Infection  Goal: Absence of Infection Signs and Symptoms  Outcome: Ongoing, Progressing     Problem: Fall Injury Risk  Goal: Absence of Fall and Fall-Related Injury  Outcome: Ongoing, Progressing     Problem: Pain Acute  Goal: Acceptable Pain Control and Functional Ability  Outcome: Ongoing, Progressing     Problem: Adjustment to Surgery (Knee Arthroplasty)  Goal: Optimal Coping  Outcome: Ongoing, Progressing     Problem: Bleeding (Knee Arthroplasty)  Goal: Absence of Bleeding  Outcome: Ongoing, Progressing     Problem: Bowel Motility Impaired (Knee Arthroplasty)  Goal: Effective Bowel Elimination  Outcome: Ongoing, Progressing     Problem: Fluid and Electrolyte Imbalance (Knee Arthroplasty)  Goal: Fluid and Electrolyte Balance  Outcome: Ongoing, Progressing     Problem: Functional Ability Impaired (Knee Arthroplasty)  Goal: Optimal Functional Ability  Outcome: Ongoing, Progressing     Problem: Infection (Knee Arthroplasty)  Goal: Absence of Infection Signs and Symptoms  Outcome: Ongoing, Progressing     Problem: Neurovascular Compromise (Knee Arthroplasty)  Goal: Intact Neurovascular Status  Outcome: Ongoing, Progressing     Problem: Ongoing Anesthesia Effects (Knee Arthroplasty)  Goal: Anesthesia/Sedation Recovery  Outcome: Ongoing, Progressing     Problem: Pain (Knee Arthroplasty)  Goal: Acceptable Pain Control  Outcome: Ongoing, Progressing     Problem: Postoperative Nausea and Vomiting (Knee  Arthroplasty)  Goal: Nausea and Vomiting Relief  Outcome: Ongoing, Progressing     Problem: Postoperative Urinary Retention (Knee Arthroplasty)  Goal: Effective Urinary Elimination  Outcome: Ongoing, Progressing     Problem: Respiratory Compromise (Knee Arthroplasty)  Goal: Effective Oxygenation and Ventilation  Outcome: Ongoing, Progressing

## 2023-01-03 NOTE — OP NOTE
DATE OF PROCEDURE: 1/3/2023    SURGEON:  Dev Weaver M.D.    ASSISTANT:REBECA Car    ASSISTANT ATTESTATION: PA was necessary and essential for all aspects of the operation, including but no limited to patient positioning, surgical exposure, bony preparation, implantation, wound closure, and dressing placement.      Hospital: [facility]     PREOPERATIVE DIAGNOSIS:  Arthritis, Right knee.     POSTOPERATIVE DIAGNOSIS:  Arthritis, Right knee.     PROCEDURES PERFORMED:  Robotically-assisted right total knee arthroplasty.     ANESTHESIA: spinal    IV FLUIDS: Per Anesthesia    ESTIMATED BLOOD LOSS:  100cc     COUNTS:  Correct.    COMPLICATIONS:  None.    IMPLANTS:   Implant Name Type Inv. Item Serial No.  Lot No. LRB No. Used Action   Femoral prep kit     [id number] Right 1 Implanted and Explanted   Tibial prep kit     [id number] Right 1 Implanted and Explanted   BASEPLATE TRITANIUM 44MM SZ 3 - [id number]  BASEPLATE TRITANIUM 44MM SZ 3  MEGHAN DeRev CASEY. [id number] Right 1 Implanted   INSERT TIBIAL CS 10MM SZ 3 - [id number]  INSERT TIBIAL CS 10MM SZ 3  MEGHAN DeRev CASEY. [id number] Right 1 Implanted   COMP FEM CR BEAD SZ 3 RIGHT - [id number]  COMP FEM CR BEAD SZ 3 RIGHT  MEGHAN DeRev CASEY. [id number] Right 1 Implanted   PATELLA TRIATHLON [id number] ASYM - [id number]  PATELLA TRIATHLON [id number] ASYM  MEGHAN SALES CASEY. [id number] Right 1 Implanted        CONDITION: Stable to PACU.        INDICATIONS FOR PROCEDURE:    Rhoda Cesar is a 67 y.o. year old female with continued pain and loss of motion of the right knee. The patient has failed conservative treatments and has been followed in my clinic. The risks, benefits, and alternatives were discussed with the patient in detail. All questions were answered. Informed consent was obtained.       PROCEDURE IN DETAIL:  Patient was found in preoperative holding by Anesthesia and found fit for surgery. The patient was taken to the operating room and placed on the operating table in  supine position. All bony prominences were well padded. Timeout was called to identify correct patient, correct procedure, and correct site, and all were in agreement. The patient underwent spinal anesthesia without complications. The patient was then prepped and draped in normal sterile fashion, leaving the right lower extremity exposed for surgery. A well-padded tourniquet was placed on right upper thigh. Approximate tourniquet time was 40 minutes at 300. The patient received preoperative antibiotics.     After exsanguination of right lower extremity, tourniquet was inflated. A 15 cm anterior incision was made over the right knee, soft tissue dissection down to the fascia, where patient had a medial parapatellar arthrotomy. The knee was then flexed and patella was everted. Remaining fat pad and meniscus were removed. The patient had severe bone-on-bone arthritis of the medial compartment and patellofemoral joint. Patient had mild varus deformity.     Next, the 2 femoral and tibial pins were then placed to allow communication with the Sush.io robotic machine. The knee was then registered with 40 trigger points both on the femoral and tibial side. Next, patient underwent soft tissue balancing, both mediolaterally in and flexion extension, evenly out to approximately 18 mm. After this was done, the Sush.io robotic machine was brought in. The distal femoral cut, anterior, posterior, and chamfer cuts were then made. The proximal tibial cut was then made. Next, trialing with a 3 femur and a 3 tibia demonstrated 10 poly with full extension. The patient was able to gravity flex to 125 degrees of gravity flexion and the patella was also resurfaced with a 32 patella. After this was done, the trialing components were removed. The bone was then copiously irrigated and dried. The actual components were then press-fit into place. The knee was placed in extension. Tourniquet was released. Hemostasis was achieved. Copious irrigation  was used to wash the wound. The patient's motion was 125 degrees of flexion. The patient was stable to stressing and patellofemoral tracking was appropriate.     After this was done, copious irrigation was used to wash the wound. The fascia was closed with 0 Ethibond, subcutaneous tissue closed with 2-0 Vicryl. Skin was closed with skin staples. Xeroform, 4 x 4's, soft tissue dressing, Ace wrap was placed over the right lower extremity. The patient was then awoken by Anesthesia and brought to PACU in stable condition.

## 2023-01-04 LAB
ANION GAP SERPL CALC-SCNC: 7 MEQ/L
BUN SERPL-MCNC: 13.7 MG/DL (ref 9.8–20.1)
CALCIUM SERPL-MCNC: 8.8 MG/DL (ref 8.4–10.2)
CHLORIDE SERPL-SCNC: 109 MMOL/L (ref 98–107)
CO2 SERPL-SCNC: 25 MMOL/L (ref 23–31)
CREAT SERPL-MCNC: 0.7 MG/DL (ref 0.55–1.02)
CREAT/UREA NIT SERPL: 20
ERYTHROCYTE [DISTWIDTH] IN BLOOD BY AUTOMATED COUNT: 12.3 % (ref 11–14.5)
GFR SERPLBLD CREATININE-BSD FMLA CKD-EPI: >90 MLS/MIN/1.73/M2
GLUCOSE SERPL-MCNC: 124 MG/DL (ref 70–110)
GLUCOSE SERPL-MCNC: 128 MG/DL (ref 82–115)
HCT VFR BLD AUTO: 40.3 % (ref 37–47)
HGB BLD-MCNC: 12.4 GM/DL (ref 12–16)
MCH RBC QN AUTO: 28.5 PG
MCHC RBC AUTO-ENTMCNC: 30.8 MG/DL (ref 33–36)
MCV RBC AUTO: 92.6 FL (ref 80–94)
NRBC BLD AUTO-RTO: 0 % (ref 0–1)
PLATELET # BLD AUTO: 188 X10(3)/MCL (ref 140–371)
PMV BLD AUTO: 10.2 FL (ref 9.4–12.4)
POCT GLUCOSE: 112 MG/DL (ref 70–110)
POCT GLUCOSE: 131 MG/DL (ref 70–110)
POCT GLUCOSE: 273 MG/DL (ref 70–110)
POTASSIUM SERPL-SCNC: 4.3 MMOL/L (ref 3.5–5.1)
RBC # BLD AUTO: 4.35 X10(6)/MCL (ref 4.2–5.4)
SODIUM SERPL-SCNC: 141 MMOL/L (ref 136–145)
WBC # SPEC AUTO: 10.3 X10(3)/MCL (ref 4.5–11.5)

## 2023-01-04 PROCEDURE — 63600175 PHARM REV CODE 636 W HCPCS

## 2023-01-04 PROCEDURE — 94799 UNLISTED PULMONARY SVC/PX: CPT

## 2023-01-04 PROCEDURE — 80048 BASIC METABOLIC PNL TOTAL CA: CPT

## 2023-01-04 PROCEDURE — 94761 N-INVAS EAR/PLS OXIMETRY MLT: CPT

## 2023-01-04 PROCEDURE — 25000003 PHARM REV CODE 250

## 2023-01-04 PROCEDURE — 97116 GAIT TRAINING THERAPY: CPT

## 2023-01-04 PROCEDURE — 97110 THERAPEUTIC EXERCISES: CPT

## 2023-01-04 PROCEDURE — 96366 THER/PROPH/DIAG IV INF ADDON: CPT

## 2023-01-04 PROCEDURE — 25000003 PHARM REV CODE 250: Performed by: ORTHOPAEDIC SURGERY

## 2023-01-04 PROCEDURE — G0378 HOSPITAL OBSERVATION PER HR: HCPCS

## 2023-01-04 PROCEDURE — 85027 COMPLETE CBC AUTOMATED: CPT

## 2023-01-04 PROCEDURE — 96376 TX/PRO/DX INJ SAME DRUG ADON: CPT

## 2023-01-04 PROCEDURE — 97530 THERAPEUTIC ACTIVITIES: CPT

## 2023-01-04 PROCEDURE — 36415 COLL VENOUS BLD VENIPUNCTURE: CPT

## 2023-01-04 RX ADMIN — FAMOTIDINE 20 MG: 20 TABLET ORAL at 08:01

## 2023-01-04 RX ADMIN — TRAMADOL HYDROCHLORIDE 50 MG: 50 TABLET, COATED ORAL at 04:01

## 2023-01-04 RX ADMIN — HYDROCODONE BITARTRATE AND ACETAMINOPHEN 1 TABLET: 5; 325 TABLET ORAL at 11:01

## 2023-01-04 RX ADMIN — KETOROLAC TROMETHAMINE 10 MG: 10 TABLET, FILM COATED ORAL at 05:01

## 2023-01-04 RX ADMIN — HYDROCODONE BITARTRATE AND ACETAMINOPHEN 1 TABLET: 5; 325 TABLET ORAL at 04:01

## 2023-01-04 RX ADMIN — TRAMADOL HYDROCHLORIDE 50 MG: 50 TABLET, COATED ORAL at 12:01

## 2023-01-04 RX ADMIN — METHOCARBAMOL 750 MG: 750 TABLET ORAL at 09:01

## 2023-01-04 RX ADMIN — KETOROLAC TROMETHAMINE 10 MG: 10 TABLET, FILM COATED ORAL at 11:01

## 2023-01-04 RX ADMIN — ASPIRIN 81 MG 81 MG: 81 TABLET ORAL at 08:01

## 2023-01-04 RX ADMIN — POLYETHYLENE GLYCOL 3350 17 G: 17 POWDER, FOR SOLUTION ORAL at 08:01

## 2023-01-04 RX ADMIN — LEVOTHYROXINE SODIUM 150 MCG: 25 TABLET ORAL at 08:01

## 2023-01-04 RX ADMIN — SENNOSIDES AND DOCUSATE SODIUM 2 TABLET: 8.6; 5 TABLET ORAL at 08:01

## 2023-01-04 RX ADMIN — METOCLOPRAMIDE 10 MG: 5 INJECTION, SOLUTION INTRAMUSCULAR; INTRAVENOUS at 04:01

## 2023-01-04 RX ADMIN — HYDROCODONE BITARTRATE AND ACETAMINOPHEN 1 TABLET: 5; 325 TABLET ORAL at 08:01

## 2023-01-04 RX ADMIN — TRAMADOL HYDROCHLORIDE 50 MG: 50 TABLET, COATED ORAL at 08:01

## 2023-01-04 RX ADMIN — GABAPENTIN 300 MG: 300 CAPSULE ORAL at 08:01

## 2023-01-04 RX ADMIN — METHOCARBAMOL 750 MG: 750 TABLET ORAL at 05:01

## 2023-01-04 RX ADMIN — CEFAZOLIN SODIUM 2000 MG: 2 INJECTION, SOLUTION INTRAVENOUS at 04:01

## 2023-01-04 RX ADMIN — KETOROLAC TROMETHAMINE 10 MG: 10 TABLET, FILM COATED ORAL at 06:01

## 2023-01-04 RX ADMIN — LISINOPRIL 10 MG: 10 TABLET ORAL at 08:01

## 2023-01-04 RX ADMIN — FERROUS SULFATE TAB 325 MG (65 MG ELEMENTAL FE) 1 EACH: 325 (65 FE) TAB at 08:01

## 2023-01-04 RX ADMIN — METOCLOPRAMIDE 10 MG: 5 INJECTION, SOLUTION INTRAMUSCULAR; INTRAVENOUS at 11:01

## 2023-01-04 NOTE — PLAN OF CARE
S/p TKR. Spk w pt & , Carlo-- hsb to asst w homecare. Pt needs RW. Provider list given. Foc obtained.   Called/ faxed referral for dme to [facility]. They will deliver to hospital.   Called/ faxed referral for outpatient therapy to [facility] They will contact pt with appointment date & time.   PCP: dR. Delmy Ponce  Rx: Super [facility] ( [facility]    Contact# Carlo [phone]         Patient DID complete a pre-op exercise regimen prior to surgery- @ [facility]

## 2023-01-04 NOTE — PROGRESS NOTES
"Status post R TKA. POD # 1  No acute events overnight.    Pain controlled.    Resting in bed.   Ambulating with PT.    Vital Signs  Temp: 97.7 °F (36.5 °C)  Temp src: Oral  Pulse: (!) 59  Resp: 16  SpO2: 99 %  Pulse Oximetry Type: Continuous  Flow (L/min): 10  Oxygen Concentration (%): 80  Device (Oxygen Therapy): room air  BP: (!) 111/58  BP Location: Right arm  BP Method: Automatic  Patient Position: Lying  Height and Weight  Height: 5' 7" (170.2 cm)  Height Method: Stated  Weight: 78.2 kg (172 lb 6.4 oz)  Weight Method: Stated  BSA (Calculated - sq m): 1.92 sq meters  BMI (Calculated): 27  Weight in (lb) to have BMI = 25: 159.3]    Lower extremity compartment soft and warm  No s/s of DVT or infection  Dressing c/d/i  NVI distally    Recent Lab Results  (Last 5 results in the past 24 hours)        01/04/23  0514   01/03/23  2042   01/03/23  1525   01/03/23  1403   01/03/23  0832        Anion Gap 7.0               BUN 13.7               BUN/CREAT RATIO 20               Calcium 8.8               Chloride 109               CO2 25               Creatinine 0.70               eGFR >90  Comment:                      EGFR INTERPRETATION    Beginning 8/15/22 we are reporting the eGFRcr calculation as recommended by the National Kid[employer] Foundation. The eGFRcr equation has similar overall performance characteristics to the older equation, but the values may differ by more than 10% particularly at higher values of eGFRcr and younger adult ages.    NKF stages of chronic kidney disease (CKD)  Stage 1: Kidney damage with normal or increased eGFR (>90 mL/min/1.73 m^2)  Stage 2: Mild reduction in GFR (60-89 mL/min/1.73 m^2)  Stage 3a: Moderate reduction in GFR (45-59 mL/min/1.73 m^2)  Stage 3b: Moderate reduction in GFR (30-44 mL/min/1.73 m^2)  Stage 4: Severe reduction in GFR (15-29 mL/min/1.73 m^2)  Stage 5: Kidney failure (GFR <15 mL/min/1.73 m^2)                   Glucose 128               Hematocrit 40.3       42.6         " Hemoglobin 12.4       13.3         MCH 28.5               MCHC 30.8               MCV 92.6               MPV 10.2               nRBC 0.0               Platelets 188               POCT Glucose   273   109     108       Potassium 4.3               RBC 4.35               RDW 12.3               Sodium 141               WBC 10.3                                      A/P:    Overall patient doing well.  Therapy for mobility and ambulation.  DVT Ppx  finish abx

## 2023-01-04 NOTE — DISCHARGE INSTRUCTIONS
[facility]  [address]  [place] [address]  Phone [phone]       /      Fax [phone]  SURGEON: Dr. Weaver    After discharge, all questions or concerns should be handled at your surgeon's office ([phone]). If it is a weekend or after hours, you will get the surgeon on call.   Discharge Medications:  PAIN MANAGEMENT: Next Dose Available   Norco 7.5/325mg (Hydrocodone/Acetaminophen) (Pain Med) - every 4-6 hours AS NEEDED for pain  4pm   Robaxin/Methocarbamol 750mg (Muscle Relaxer) - Every 6-8 hours AS NEEDED for muscle spasms, thigh pain or additional pain control 6pm               COMPLICATION PREVENTION MEDS: Next Dose DUE   Aspirin 81mg twice a day for 6 weeks post-op for blood clot prevention PM on 1/5/23   MiraLAX 17gm - once or twice a day while on narcotics and muscle relaxers for constipation prevention PM on 1/5/23   Senokot S/Cathie-Colace 8.6/50mg - 2 tablets once or twice a day while on narcotics and muscle relaxers for constipation prevention PM on 1/5/23       EXTRAS: Next Dose Available                          Total Knee Replacement      PAIN MEDICATIONS/PAIN MANAGEMENT:      Norco 7.5 /325mg (Hydrocodone/Acetaminophen) (pain pill) You can take 1 tablet every 4-6 hours for pain. If the pain is mild, take 1/2 of a pill. Once you start taking a half of a pain pill, you can take a Tylenol 325mg with each dose for a little extra pain relief without side effects. Gradually decrease the use as the pain lessens. As you decrease the Norco, increase Tylenol.    **NO MORE THAN 3000mg OF TYLENOL IN 24 HOURS**.     Robaxin/Methocarbamol 750mg (muscle relaxer)- you can take every 6-8 hours as needed for muscle spasms, thigh pain and stiffness, additional pain control or breakthrough pain medications. This medication is helpful for pain control while lessening your need for narcotics. Please reduce the use gradually as the pain and spasms lessen. DO NOT TAKE AT  THE SAME TIME AS A PAIN PILL. YOU WILL BE BETTER SERVED WITH 2 HOURS BETWEEN PAIN PILL AND MUSCLE RELAXER.           Use the medication log in your discharge packet to keep track of your medications.      **Other things that help with pain control is WALKING, COMPRESSION WRAP, ICE and ELEVATION!!**      BLOOD CLOT PREVENTION:   Aspirin 81 mg twice a day for 6 weeks postop. Start on 1/5/23. Stop on 2/15/23.  If you were taking a baby aspirin prior to surgery, okay to restart after 6 weeks - 2/16/23.  You need to continuing wearing your compression stocking (MARSHALL Hose - ThromboEmbolic Disease Prevention Device) for the next 2-6 weeks post-op. It is ok to remove them for hygiene and at bedtime.   Hand wash and Dry. **If the swelling persists in the legs after you stop wearing the Marshall hose, continue to wear them until the swelling decreases.**  REMOVE STOCKINGS AT LEAST DAILY FOR SKIN ASSESSMENT.   Do NOT let the stockings roll down, creating a tourniquet around the back of your knee. If you need to, leave the excess at the bottom of the stocking.   The best thing you can do to prevent blood clots is to walk around as much as possible, AT LEAST EVERY 1-2 HOURS.       CONSTIPATION PREVENTION:   Miralax or Senokot S/Cathie-Colace and Stool softeners EVERY DAY while on pain meds.  Use other more aggressive over the counter LAXATIVES as needed for constipation (Examples: Milk of Magnesia, Dulcolax tabs or suppository, Magnesium Citrate, Fleet's Enema...etc.)   Drink lots of water.  Increase Fiber in diet.  Increase walking distance each day  DO NOT GO TOO LONG WITHOUT HAVING A BOWEL MOVEMENT!      ACTIVITY:  You will be FULL weight-bearing on your operative leg.  Please do not take yourself off of the walker too soon.  Please allow your outpatient therapist or surgeon to guide you.  You will be range of motion as tolerated to your operative knee.  Work on bending and straightening the knee and change positions often throughout  the day.  Do not put anything behind the knee, keeping it in a bent position.  Elevate your affected extremity way above the level of the heart to reduce swelling 2 to 3 times a day, 20-30 minutes at a time.  Walk around at least every 1-2 hours while awake.  No heavy lifting, pulling, pushing or straining.  Full Range of Motion to the Knee - working on bending and straightening     THERAPY  Outpatient Physical Therapy-bring your prescription to the clinic of your choice as soon as possible.    WOUND CARE:   Dry dressing changes every other day and as needed for soiling for 14 days. Start Saturday  DO NOT WET WOUND or apply any ointments, creams, lotions or antiseptics.  Ace wrap - apply your compression stocking and apply the ace wrap where the stocking stops for extra added compression to the knee.   Ok to shower before then if able to keep wound from getting wet (plastic barrier, saran wrap or cling wrap and tape).   DO NOT TOUCH INCISION  Apply Ice to the Knee and thigh as much as possible.      URINARY RETENTION:  If you start having difficulty urinating, decrease the use of Pain pills and muscle relaxers and notify your primary care doctor.     PNEUMONIA PREVENTION:  Stay out of bed as much as possible and walk around every 1-2 hours.  Continue breathing exercises (Incentive Spirometry) every 1-2 hours while mobility is limited and while you are on pain pills.    INFECTION PREVENTION:  Proper handwashing before and after dressing changes. Do not wet the wound. Wound care instructions as written above. NOTIFY MD OF EXCESSIVE WOUND DRAINAGE.  No alcohol, smoking or tobacco products  Pets should not be allowed around the wound or the dressing.   Treat UTI and skin infections as soon as possible.  Pre-medicate with antibiotics prior to dental or surgical procedures.   If you are diabetic, MAINTAIN GOOD BLOOD SUGAR CONTROL (Below 150) DURING YOUR RECOVERY. If you see high numbers, notify your primary care doctor.      Call your SURGEON'S OFFICE ([phone]) if you experience the following signs and symptoms of infection:   Unusual redness, swelling, excessive, cloudy or foul smelling drainage at the incision site.   Persistent low grade temp OR a temp greater than 102 F, unrelieved by Tylenol  Pain at surgical site, unrelieved by pain meds    Warning signs of a blood clot in your leg: (CALL YOUR SURGEON)  New onset or increasing pain in calf, new onset tenderness or redness above or below the knee or increasing swelling of your calf, ankle, or foot.  Warning signs that a blood clot has traveled to your lungs: (REPORT TO THE ER/CALL 239)  Sudden or increase in Shortness of breath, sudden onset of chest pains, or  Localized chest pain with coughing.         IF ANY ISSUES ARISE AND YOU FEEL THE NEED TO CALL YOUR PRIMARY CARE DOCTOR, PLEASE LET YOUR SURGEON KNOW AS WELL.     For emergencies, please report to OUR ([facility] or [facility]) Emergency department and tell them to call YOUR SURGEON at [phone].     BEFORE MAKING ANY CHANGES TO THE MEDICAL CARE PLAN OR GOING TO THE EMERGENCY ROOM, PLEASE CONTACT THE SURGEON.    3rd floor nursing unit # [phone]  Use this number for questions about your discharge instructions or problems filling your discharge prescriptions.

## 2023-01-04 NOTE — PT/OT/SLP PROGRESS
Physical Therapy Treatment    Patient Name:  Rhoda Cesar   MRN:  [id number]    Recommendations:     Discharge Recommendations: outpatient PT  Discharge Equipment Recommendations: walker, rolling  Barriers to discharge:  pain    Assessment:     Rhoda Cesar is a 67 y.o. female admitted with a medical diagnosis of Primary osteoarthritis of right knee.  She presents with the following impairments/functional limitations: weakness, impaired endurance, impaired functional mobility, gait instability, impaired balance, decreased lower extremity function, decreased safety awareness, pain, decreased ROM, edema, orthopedic precautions. Pt was taught home exs this session and experienced moderate pain with this activity. ROM measurements were taken , but passive was not done secondary to her right knee pain.    Rehab Prognosis: Good; patient would benefit from acute skilled PT services to address these deficits and reach maximum level of function.    Recent Surgery: Procedure(s) (LRB):  ROBOTIC ARTHROPLASTY, KNEE, TOTAL (Right) 1 Day Post-Op    Plan:     During this hospitalization, patient to be seen BID to address the identified rehab impairments via gait training, therapeutic activities, therapeutic exercises and progress toward the following goals:    Plan of Care Expires:  01/09/23    Subjective     Chief Complaint: right knee pain  Patient/Family Comments/goals: pt feels like staying overnight wouldd be best  Pain/Comfort:  Pain Rating 1: 9/10  Location - Side 1: Right  Location 1: knee  Pain Addressed 2: Cessation of Activity, Nurse notified      Objective:     Communicated with her nurseRama prior to session.  Patient found up in chair with   upon PT entry to room.     General Precautions: Standard,    Orthopedic Precautions: RLE weight bearing as tolerated  Braces: N/A  Respiratory Status: Room air     Functional Mobility:  Transfers:     Sit to Stand:  supervision with rolling walker  Gait: pt ambulated with a  step to gait pattern with a rw for 75ft      AM-PAC 6 CLICK MOBILITY          Treatment & Education:  Pt was taught her HEP and see was observed to adequately perform all exs for 10 reps. She needed assist with SLR, and SAQs were difficult. Pt's  was shown how to assist her with SLR ex      (In degrees) AROM PROM   R knee flexion 45    R knee extension Full ext     PROM not done for knee flex because of her right knee pain    Patient left up in chair with call button in reach..    GOALS:   Multidisciplinary Problems       Physical Therapy Goals          Problem: Physical Therapy    Goal Priority Disciplines Outcome Goal Variances Interventions   Physical Therapy Goal     PT, PT/OT      Description: Pt will improve functional independence by performing:    Bed mobility: SBA  Sit to stand: SBA  Car Transfer: Min A  with rolling walker  Ambulation x 100' feet with SBA and rolling walker  1 Step (Curb): Min A  and rolling walker  3 Steps: Min A  and R or L rail  right knee AROM flexion (in degrees): 60  right knee AROM extension (in degrees): 0   Independent with total knee HEP-met                           Time Tracking:     PT Received On: 01/04/23  PT Start Time: 1318     PT Stop Time: 1345  PT Total Time (min): 27 min     Billable Minutes: Gait Training 10 and Therapeutic Exercise 17    Treatment Type: Treatment  PT/PTA: PT           01/04/2023

## 2023-01-04 NOTE — PT/OT/SLP PROGRESS
"Physical Therapy Treatment    Patient Name:  Rhoda Cesar   MRN:  [id number]    Recommendations:     Discharge Recommendations: outpatient PT  Discharge Equipment Recommendations: walker, rolling  Barriers to discharge:  pain, decreased safety to access to the home d/t rail being installed just today    Assessment:     Rhoda Cesar is a 67 y.o. female admitted with a medical diagnosis of Primary osteoarthritis of right knee.  She presents with the following impairments/functional limitations: weakness, impaired endurance, impaired functional mobility, gait instability, impaired balance, decreased lower extremity function, decreased safety awareness, pain, decreased ROM, edema, orthopedic precautions.    Rehab Prognosis: Good; patient would benefit from acute skilled PT services to address these deficits and reach maximum level of function.    Recent Surgery: Procedure(s) (LRB):  ROBOTIC ARTHROPLASTY, KNEE, TOTAL (Right) 1 Day Post-Op    Plan:     During this hospitalization, patient to be seen BID to address the identified rehab impairments via gait training, therapeutic activities, therapeutic exercises and progress toward the following goals:    Plan of Care Expires:  01/09/23    Subjective     Chief Complaint: R knee pain  Patient/Family Comments/goals: "To hopefully go home today"  Pain/Comfort:  Location - Side 1: Right  Location 1: knee  Pain Addressed 1: Pre-medicate for activity, Reposition, Distraction, Cessation of Activity, Nurse notified      Objective:     Communicated with QUENTIN Ontiveros prior to session.  Patient found up in chair with  present upon PT entry to room.     General Precautions: Standard,    Orthopedic Precautions: RLE weight bearing as tolerated  Braces: N/A  Respiratory Status: Room air     Functional Mobility:  Transfers:     Sit to Stand:  contact guard assistance with rolling walker  Car Transfer: stand by assistance with  rolling walker  using  Step Transfer  Gait: 125ft with RW, " slow pace, antalgic pattern, limited flexion on R knee.  Stairs:  Pt ascended/descended 3 stair(s) with No Assistive Device with right handrail with Minimal Assistance.   Ascend/descend a 4 inch curb with Min assist using RW. Slight LOB when ascending step d/t sudden R knee ain.       Treatment & Education:  - Note: Deferred HEP and ROM at this time d/t increased pain from mobility. Verbally reviewed bed mobility with pt.     Patient left up in chair with call button in reach, QUENTIN Ontiveros notified, and  present..    GOALS:   Multidisciplinary Problems       Physical Therapy Goals          Problem: Physical Therapy    Goal Priority Disciplines Outcome Goal Variances Interventions   Physical Therapy Goal     PT, PT/OT      Description: Pt will improve functional independence by performing:    Bed mobility: SBA  MET - Sit to stand: SBA  MET - Car Transfer: Min A  with rolling walker  MET - Ambulation x 100' feet with SBA and rolling walker  MET - 1 Step (Curb): Min A  and rolling walker  MET - 3 Steps: Min A  and R or L rail  right knee AROM flexion (in degrees): 60  right knee AROM extension (in degrees): 0   Independent with total knee HEP-met                           Time Tracking:     PT Received On:    PT Start Time: 0954     PT Stop Time: 1017  PT Total Time (min): 23 min     Billable Minutes: Therapeutic Activity 23    Treatment Type: Treatment  PT/PTA: PT           01/04/2023

## 2023-01-04 NOTE — PT/OT/SLP EVAL
"Physical Therapy Evaluation    Patient Name:  Rhoda Cesar   MRN:  [id number]    Recommendations:     Discharge Recommendations: outpatient PT, home   Discharge Equipment Recommendations: walker, rolling   Barriers to discharge: None    Assessment:     Rhoda Cesar is a 67 y.o. female admitted with a medical diagnosis of Primary osteoarthritis of right knee.  She presents with the following impairments/functional limitations: weakness, impaired endurance, impaired functional mobility, gait instability, impaired balance, decreased lower extremity function, decreased safety awareness, pain, decreased ROM, edema, orthopedic precautions.    Rehab Prognosis: Good; patient would benefit from acute skilled PT services to address these deficits and reach maximum level of function.    Recent Surgery: Procedure(s) (LRB):  ROBOTIC ARTHROPLASTY, KNEE, TOTAL (Right) Day of Surgery    Plan:     During this hospitalization, patient to be seen BID to address the identified rehab impairments via gait training, therapeutic activities, therapeutic exercises and progress toward the following goals:    Plan of Care Expires:  01/09/23    Subjective     Chief Complaint: R knee pain prior to sx  Patient/Family Comments/goals: "To get back to working, cleaning houses"  Pain/Comfort:  Location - Side 1: Right  Location 1: knee  Pain Addressed 1: Pre-medicate for activity, Distraction, Reposition, Cessation of Activity    Patients cultural, spiritual, Alevism conflicts given the current situation: no    Living Environment:  Pt lives with her  and daughter in a single story home with 3 steps to enter. Her  reports that they will have bo rails installed as soon as possible, however they will not be able to be reached at the same time. There is also a threshold to get into the home. The patient works full-time as a , and reports being very active.  Prior to admission, patients level of function was independent.  " Equipment used at home: none.  DME owned (not currently used): none - the patient will need a RW. Upon discharge, patient will have assistance from her  and daughter..  Prehab: the patient reports that she has a very active lifestyle and considered her work as preparation for the surgery.  Objective:     Communicated with QUENTIN Ontiveros prior to session.  Patient found HOB elevated with peripheral IV, blood pressure cuff, telemetry, pulse ox (continuous)  upon PT entry to room.    General Precautions: Standard,    Orthopedic Precautions:RLE weight bearing as tolerated   Braces: N/A  Respiratory Status: Room air    Exams:  RLE ROM:      (In degrees) AROM PROM   R knee flexion 45 55   R knee extension Lacking 3      RLE Strength: NT d/t surgical side  LLE ROM: WFL  LLE Strength: WFL    Functional Mobility:  Bed Mobility:     Supine to Sit: minimum assistance for RLE  Transfers:     Sit to Stand:  moderate assistance with rolling walker  Gait: 60ft with RW, Min A, slow pace, antalgic gait, step-to pattern. Distance limited by fatigue and pain.      Patient left up in chair with all lines intact, call button in reach, NSG notified, and  and daughter present.    GOALS:   Multidisciplinary Problems       Physical Therapy Goals          Problem: Physical Therapy    Goal Priority Disciplines Outcome Goal Variances Interventions   Physical Therapy Goal     PT, PT/OT      Description: Pt will improve functional independence by performing:    Bed mobility: SBA  Sit to stand: SBA  Car Transfer: Min A  with rolling walker  Ambulation x 100' feet with SBA and rolling walker  1 Step (Curb): Min A  and rolling walker  3 Steps: Min A  and R or L rail  right knee AROM flexion (in degrees): 60  right knee AROM extension (in degrees): 0   Independent with total knee HEP                           History:     Past Medical History:   Diagnosis Date    Arthritis     Diabetes mellitus     Digestive disorder     Hypertension      Thyroid disease        Past Surgical History:   Procedure Laterality Date    APPENDECTOMY      CHOLECYSTECTOMY      COLONOSCOPY      KNEE ARTHROSCOPY      TUBAL LIGATION         Time Tracking:     PT Received On:    PT Start Time: 1720     PT Stop Time: 1741  PT Total Time (min): 21 min     Billable Minutes: Evaluation 21 min Moderate      01/03/2023

## 2023-01-05 VITALS
WEIGHT: 172.38 LBS | OXYGEN SATURATION: 99 % | TEMPERATURE: 98 F | HEART RATE: 66 BPM | SYSTOLIC BLOOD PRESSURE: 108 MMHG | BODY MASS INDEX: 27.06 KG/M2 | RESPIRATION RATE: 16 BRPM | DIASTOLIC BLOOD PRESSURE: 64 MMHG | HEIGHT: 67 IN

## 2023-01-05 LAB
ANION GAP SERPL CALC-SCNC: 6 MEQ/L
BUN SERPL-MCNC: 14 MG/DL (ref 9.8–20.1)
CALCIUM SERPL-MCNC: 8.9 MG/DL (ref 8.4–10.2)
CHLORIDE SERPL-SCNC: 109 MMOL/L (ref 98–107)
CO2 SERPL-SCNC: 25 MMOL/L (ref 23–31)
CREAT SERPL-MCNC: 0.61 MG/DL (ref 0.55–1.02)
CREAT/UREA NIT SERPL: 23
ERYTHROCYTE [DISTWIDTH] IN BLOOD BY AUTOMATED COUNT: 12.6 % (ref 11–14.5)
GFR SERPLBLD CREATININE-BSD FMLA CKD-EPI: >90 MLS/MIN/1.73/M2
GLUCOSE SERPL-MCNC: 131 MG/DL (ref 70–110)
GLUCOSE SERPL-MCNC: 131 MG/DL (ref 82–115)
HCT VFR BLD AUTO: 37.2 % (ref 37–47)
HGB BLD-MCNC: 11.5 GM/DL (ref 12–16)
MCH RBC QN AUTO: 28.5 PG
MCHC RBC AUTO-ENTMCNC: 30.9 MG/DL (ref 33–36)
MCV RBC AUTO: 92.1 FL (ref 80–94)
NRBC BLD AUTO-RTO: 0 % (ref 0–1)
PLATELET # BLD AUTO: 181 X10(3)/MCL (ref 140–371)
PMV BLD AUTO: 10 FL (ref 9.4–12.4)
POCT GLUCOSE: 95 MG/DL (ref 70–110)
POTASSIUM SERPL-SCNC: 4.1 MMOL/L (ref 3.5–5.1)
RBC # BLD AUTO: 4.04 X10(6)/MCL (ref 4.2–5.4)
SODIUM SERPL-SCNC: 140 MMOL/L (ref 136–145)
WBC # SPEC AUTO: 7.5 X10(3)/MCL (ref 4.5–11.5)

## 2023-01-05 PROCEDURE — 25000003 PHARM REV CODE 250

## 2023-01-05 PROCEDURE — 25000003 PHARM REV CODE 250: Performed by: ORTHOPAEDIC SURGERY

## 2023-01-05 PROCEDURE — 85027 COMPLETE CBC AUTOMATED: CPT

## 2023-01-05 PROCEDURE — G0378 HOSPITAL OBSERVATION PER HR: HCPCS

## 2023-01-05 PROCEDURE — 97530 THERAPEUTIC ACTIVITIES: CPT

## 2023-01-05 PROCEDURE — 80048 BASIC METABOLIC PNL TOTAL CA: CPT

## 2023-01-05 PROCEDURE — 97110 THERAPEUTIC EXERCISES: CPT

## 2023-01-05 PROCEDURE — 36415 COLL VENOUS BLD VENIPUNCTURE: CPT

## 2023-01-05 PROCEDURE — 94761 N-INVAS EAR/PLS OXIMETRY MLT: CPT

## 2023-01-05 PROCEDURE — 94799 UNLISTED PULMONARY SVC/PX: CPT

## 2023-01-05 RX ORDER — HYDROCODONE BITARTRATE AND ACETAMINOPHEN 5; 325 MG/1; MG/1
1 TABLET ORAL EVERY 4 HOURS PRN
Status: DISCONTINUED | OUTPATIENT
Start: 2023-01-05 | End: 2023-01-05 | Stop reason: HOSPADM

## 2023-01-05 RX ORDER — METHOCARBAMOL 750 MG/1
750 TABLET, FILM COATED ORAL EVERY 8 HOURS PRN
Qty: 42 TABLET | Refills: 0 | Status: SHIPPED | OUTPATIENT
Start: 2023-01-05 | End: 2023-01-11 | Stop reason: SDUPTHER

## 2023-01-05 RX ORDER — HYDROCODONE BITARTRATE AND ACETAMINOPHEN 7.5; 325 MG/1; MG/1
1 TABLET ORAL EVERY 4 HOURS PRN
Status: DISCONTINUED | OUTPATIENT
Start: 2023-01-05 | End: 2023-01-05 | Stop reason: HOSPADM

## 2023-01-05 RX ORDER — POLYETHYLENE GLYCOL 3350 17 G/17G
17 POWDER, FOR SOLUTION ORAL DAILY
Qty: 14 EACH | Refills: 0 | Status: SHIPPED | OUTPATIENT
Start: 2023-01-05 | End: 2023-01-19

## 2023-01-05 RX ORDER — ASPIRIN 81 MG/1
81 TABLET ORAL EVERY 12 HOURS
Qty: 84 TABLET | Refills: 0 | Status: SHIPPED | OUTPATIENT
Start: 2023-01-05 | End: 2024-03-13

## 2023-01-05 RX ORDER — HYDROCODONE BITARTRATE AND ACETAMINOPHEN 7.5; 325 MG/1; MG/1
1 TABLET ORAL EVERY 4 HOURS PRN
Qty: 42 TABLET | Refills: 0 | Status: SHIPPED | OUTPATIENT
Start: 2023-01-05 | End: 2023-01-11 | Stop reason: SDUPTHER

## 2023-01-05 RX ADMIN — SENNOSIDES AND DOCUSATE SODIUM 2 TABLET: 8.6; 5 TABLET ORAL at 08:01

## 2023-01-05 RX ADMIN — KETOROLAC TROMETHAMINE 10 MG: 10 TABLET, FILM COATED ORAL at 05:01

## 2023-01-05 RX ADMIN — HYDROCODONE BITARTRATE AND ACETAMINOPHEN 1 TABLET: 5; 325 TABLET ORAL at 04:01

## 2023-01-05 RX ADMIN — DOCUSATE SODIUM 200 MG: 100 CAPSULE, LIQUID FILLED ORAL at 08:01

## 2023-01-05 RX ADMIN — HYDROCODONE BITARTRATE AND ACETAMINOPHEN 1 TABLET: 7.5; 325 TABLET ORAL at 08:01

## 2023-01-05 RX ADMIN — METHOCARBAMOL 750 MG: 750 TABLET ORAL at 02:01

## 2023-01-05 RX ADMIN — HYDROCODONE BITARTRATE AND ACETAMINOPHEN 1 TABLET: 7.5; 325 TABLET ORAL at 12:01

## 2023-01-05 RX ADMIN — METHOCARBAMOL 750 MG: 750 TABLET ORAL at 10:01

## 2023-01-05 RX ADMIN — ASPIRIN 81 MG 81 MG: 81 TABLET ORAL at 08:01

## 2023-01-05 RX ADMIN — KETOROLAC TROMETHAMINE 10 MG: 10 TABLET, FILM COATED ORAL at 12:01

## 2023-01-05 RX ADMIN — LISINOPRIL 10 MG: 10 TABLET ORAL at 08:01

## 2023-01-05 RX ADMIN — FERROUS SULFATE TAB 325 MG (65 MG ELEMENTAL FE) 1 EACH: 325 (65 FE) TAB at 08:01

## 2023-01-05 RX ADMIN — FAMOTIDINE 20 MG: 20 TABLET ORAL at 08:01

## 2023-01-05 NOTE — PLAN OF CARE
Problem: Adult Inpatient Plan of Care  Goal: Plan of Care Review  Outcome: Met  Goal: Patient-Specific Goal (Individualized)  Outcome: Met  Goal: Absence of Hospital-Acquired Illness or Injury  Outcome: Met  Goal: Optimal Comfort and Wellbeing  Outcome: Met  Goal: Readiness for Transition of Care  Outcome: Met     Problem: Diabetes Comorbidity  Goal: Blood Glucose Level Within Targeted Range  Outcome: Met     Problem: Infection  Goal: Absence of Infection Signs and Symptoms  Outcome: Met     Problem: Fall Injury Risk  Goal: Absence of Fall and Fall-Related Injury  Outcome: Met     Problem: Pain Acute  Goal: Acceptable Pain Control and Functional Ability  Outcome: Met     Problem: Adjustment to Surgery (Knee Arthroplasty)  Goal: Optimal Coping  Outcome: Met     Problem: Bleeding (Knee Arthroplasty)  Goal: Absence of Bleeding  Outcome: Met     Problem: Bowel Motility Impaired (Knee Arthroplasty)  Goal: Effective Bowel Elimination  Outcome: Met     Problem: Fluid and Electrolyte Imbalance (Knee Arthroplasty)  Goal: Fluid and Electrolyte Balance  Outcome: Met     Problem: Functional Ability Impaired (Knee Arthroplasty)  Goal: Optimal Functional Ability  Outcome: Met     Problem: Infection (Knee Arthroplasty)  Goal: Absence of Infection Signs and Symptoms  Outcome: Met     Problem: Neurovascular Compromise (Knee Arthroplasty)  Goal: Intact Neurovascular Status  Outcome: Met     Problem: Ongoing Anesthesia Effects (Knee Arthroplasty)  Goal: Anesthesia/Sedation Recovery  Outcome: Met     Problem: Pain (Knee Arthroplasty)  Goal: Acceptable Pain Control  Outcome: Met     Problem: Postoperative Nausea and Vomiting (Knee Arthroplasty)  Goal: Nausea and Vomiting Relief  Outcome: Met     Problem: Postoperative Urinary Retention (Knee Arthroplasty)  Goal: Effective Urinary Elimination  Outcome: Met     Problem: Respiratory Compromise (Knee Arthroplasty)  Goal: Effective Oxygenation and Ventilation  Outcome: Met

## 2023-01-05 NOTE — NURSING
Discharge instructions given to patient. Prescriptions for outpatient therapy, norco, robaxin, aspirin, miralax given. Coverlets, ace wrap, tape given for home dressing changes. Stated understanding of all instructions.

## 2023-01-05 NOTE — PLAN OF CARE
Problem: Physical Therapy  Goal: Physical Therapy Goal  Description: Pt will improve functional independence by performing:    MET - Bed mobility: SBA  MET - Sit to stand: SBA  MET - Car Transfer: Min A  with rolling walker  MET - Ambulation x 100' feet with SBA and rolling walker  MET - 1 Step (Curb): Min A  and rolling walker  MET - 3 Steps: Min A  and R or L rail  MET - right knee AROM flexion (in degrees): 60  MET - right knee AROM extension (in degrees): 0   Independent with total knee HEP-met      Outcome: Met

## 2023-01-05 NOTE — PROGRESS NOTES
"Status post R TKA. POD # 2  No acute events overnight.    Pain better but still having some difficulty bending knee 2/2 pain.  Resting in bed.   Ambulating with PT.    Vital Signs  Temp: 97.7 °F (36.5 °C)  Temp src: Oral  Pulse: 72  Resp: 17  SpO2: 97 %  Pulse Oximetry Type: Continuous  Flow (L/min): 10  Oxygen Concentration (%): 80  Device (Oxygen Therapy): room air  BP: 123/69  BP Location: Right arm  BP Method: Automatic  Patient Position: Lying  Height and Weight  Height: 5' 7" (170.2 cm)  Height Method: Stated  Weight: 78.2 kg (172 lb 6.4 oz)  Weight Method: Stated  BSA (Calculated - sq m): 1.92 sq meters  BMI (Calculated): 27  Weight in (lb) to have BMI = 25: 159.3]    Lower extremity compartment soft and warm  No s/s of DVT or infection  Dressing c/d/I. Changed this am.  NVI distally    Recent Lab Results  (Last 5 results in the past 24 hours)        01/05/23  0618   01/05/23  0507   01/04/23  2058   01/04/23  1618   01/04/23  1033        Anion Gap   6.0             BUN   14.0             BUN/CREAT RATIO   23             Calcium   8.9             Chloride   109             CO2   25             Creatinine   0.61             eGFR   >90  Comment:                      EGFR INTERPRETATION    Beginning 8/15/22 we are reporting the eGFRcr calculation as recommended by the National Kid[employer]. The eGFRcr equation has similar overall performance characteristics to the older equation, but the values may differ by more than 10% particularly at higher values of eGFRcr and younger adult ages.    NKF stages of chronic kidney disease (CKD)  Stage 1: Kidney damage with normal or increased eGFR (>90 mL/min/1.73 m^2)  Stage 2: Mild reduction in GFR (60-89 mL/min/1.73 m^2)  Stage 3a: Moderate reduction in GFR (45-59 mL/min/1.73 m^2)  Stage 3b: Moderate reduction in GFR (30-44 mL/min/1.73 m^2)  Stage 4: Severe reduction in GFR (15-29 mL/min/1.73 m^2)  Stage 5: Kidney failure (GFR <15 mL/min/1.73 m^2)                 " Glucose   131             Hematocrit   37.2             Hemoglobin   11.5             MCH   28.5             MCHC   30.9             MCV   92.1             MPV   10.0             nRBC   0.0             Platelets   181             POC Glucose 131     124           POCT Glucose       131   112       Potassium   4.1             RBC   4.04             RDW   12.6             Sodium   140             WBC   7.5                                    A/P:    Overall patient doing well.  Therapy for mobility and ambulation.  DVT Ppx  Increased Norco 5 --> 7.5. Will see how pt does with PT and new pain med.  Dispo pending

## 2023-01-05 NOTE — PT/OT/SLP PROGRESS
"Physical Therapy Treatment    Patient Name:  Rhoda Cesar   MRN:  [id number]    Recommendations:     Discharge Recommendations: outpatient PT, home  Discharge Equipment Recommendations: walker, rolling  Barriers to discharge: None    Assessment:     Rhoda Cesar is a 67 y.o. female admitted with a medical diagnosis of Primary osteoarthritis of right knee.  She presents with the following impairments/functional limitations: weakness, impaired endurance, impaired functional mobility, gait instability, impaired balance, decreased lower extremity function, decreased safety awareness, pain, decreased ROM, edema, orthopedic precautions.    Rehab Prognosis: Good; patient would benefit from acute skilled PT services to address these deficits and reach maximum level of function.    Recent Surgery: Procedure(s) (LRB):  ROBOTIC ARTHROPLASTY, KNEE, TOTAL (Right) 2 Days Post-Op    Plan:     During this hospitalization, patient to be seen BID to address the identified rehab impairments via gait training, therapeutic activities, therapeutic exercises and progress toward the following goals:    Plan of Care Expires:  01/09/23    Subjective     Chief Complaint: "I'm ready to go home"  Patient/Family Comments/goals: "To go to my therapy appointment tomorrow"  Pain/Comfort:  Location - Side 1: Right  Location 1: knee  Pain Addressed 1: Pre-medicate for activity, Reposition, Cessation of Activity      Objective:     Communicated with QUENTIN Ontiveros prior to session.  Patient found up in chair with  present upon PT entry to room.     General Precautions: Standard,    Orthopedic Precautions: RLE weight bearing as tolerated  Braces: N/A  Respiratory Status: Room air     Functional Mobility:  Transfers:     Sit to Stand:  modified independence with rolling walker  Gait: 120ft with RW, Mod I      Treatment & Education:  - Seated knee flexion, reclined quad sets x10 both. Instructed pt to complete rest of HEP at home.    Patient left up " in chair with call button in reach, Rama notified, and  present..    GOALS:   Multidisciplinary Problems       Physical Therapy Goals       Not on file              Multidisciplinary Problems (Resolved)          Problem: Physical Therapy    Goal Priority Disciplines Outcome Goal Variances Interventions   Physical Therapy Goal   (Resolved)     PT, PT/OT Met     Description: Pt will improve functional independence by performing:    MET - Bed mobility: SBA  MET - Sit to stand: SBA  MET - Car Transfer: Min A  with rolling walker  MET - Ambulation x 100' feet with SBA and rolling walker  MET - 1 Step (Curb): Min A  and rolling walker  MET - 3 Steps: Min A  and R or L rail  MET - right knee AROM flexion (in degrees): 60  MET - right knee AROM extension (in degrees): 0   Independent with total knee HEP-met                           Time Tracking:     PT Received On:    PT Start Time: 1355     PT Stop Time: 1409  PT Total Time (min): 14 min     Billable Minutes: Therapeutic Exercise 14 min    Pt seen for PT Last Visit session. Answered all last questions/concerns as appropriate. Pt is ready for DC from hospital later today. This note to serve as DC Summary Note as well.       Treatment Type: Treatment  PT/PTA: PT           01/05/2023

## 2023-01-06 LAB — VIEW PATHOLOGY REPORT (RELIAPATH): NORMAL

## 2023-01-06 NOTE — DISCHARGE SUMMARY
ADMIT DATE: 1/3/2023    DISCHARGE DATE: 1/5/2023  2:25 PM     DIAGNOSIS:  End-stage osteoarthritis, right knee.      OPERATIVE PROCEDURE:  Right total knee arthroplasty.      HOSPITAL COURSE: Rhoda Cesar is a 67 y.o.  old female  who was admitted on the above date for the above procedure.  The patient tolerated surgery well, was back up to the orthopedic floor in stable condition.  Postoperatively, the patient is eating well, having normal bowel movements.  Pain is well controlled with p.o. pain medicine.  The patient has been followed by physical therapy, goals met.      PHYSICAL EXAM:  GENERAL:  The patient is a well nourished, well developed female.  The patient is awake, alert and oriented x3 and in no apparent distress.  The patient is pleasant and cooperative.   EXTREMITIES:  Examination of the right lower extremity compartments soft and warm.  Skin is intact.  No signs or symptoms of DVT or infection.  The incision is clean, dry, and intact.  The patient's motion is 0-70 degrees.  Stable to stressing and neurovascularly intact distally.      DISPOSITION:  Home with outpatient physical therapy.      FOLLOWUP:     Follow-up Information       Dev Weaver MD. Go on 1/18/2023.    Specialty: Orthopedic Surgery  Why: Ortho follow up appt on Wednesday January 18th at 915am  Contact information:  [address]  [address]  [facility] [address]  [phone]               Counts include 234 beds at the [facility] Follow up.    Why: This is the equipment company. Call if you have questions or concerns.  Contact information:  [address] [facility] [address]  [phone]               [facility] Follow up.    Specialty: Physical Therapy  Why: This is the outpatient therapy facility. They will contact pt with appt date & time. Call if you have questions or concerns.  Contact information:  [address]  [address]  [place] [address]  [phone]                                  MEDICATIONS:    Reconciled Home Medications:      Medication List        START taking these medications      aspirin 81 MG EC tablet  Commonly known as: ECOTRIN  Take 1 tablet (81 mg total) by mouth every 12 (twelve) hours.     HYDROcodone-acetaminophen 7.5-325 mg per tablet  Commonly known as: NORCO  Take 1 tablet by mouth every 4 (four) hours as needed for Pain.     methocarbamoL 750 MG Tab  Commonly known as: ROBAXIN  Take 1 tablet (750 mg total) by mouth every 8 (eight) hours as needed (Muscle spasms/Thigh Pain).     polyethylene glycol 17 gram Pwpk  Commonly known as: GLYCOLAX  Take 17 g by mouth once daily. for 14 days            CHANGE how you take these medications      cetirizine 10 MG tablet  Commonly known as: ZYRTEC  TAKE ONE TABLET BY MOUTH EVERY DAY  What changed: when to take this     lisinopriL 10 MG tablet  TAKE ONE TABLET BY MOUTH EVERY DAY  What changed: when to take this     SYNTHROID 150 MCG tablet  Generic drug: levothyroxine  TAKE ONE TABLET BY MOUTH EVERY DAY  What changed:   how much to take  when to take this            CONTINUE taking these medications      acetaminophen 650 MG Tbsr  Commonly known as: TYLENOL  Take 650 mg by mouth 2 (two) times daily.     ascorbic acid (vitamin C) 500 MG tablet  Commonly known as: VITAMIN C  Take 500 mg by mouth once daily.     blood-glucose meter Misc  Commonly known as: ONETOUCH ULTRA2 METER  USE TO CHECK BLOOD GLUCOSE LEVELS ONCE DAILY     ferrous sulfate Tab tablet  Commonly known as: FEOSOL  Take 1 tablet by mouth daily with breakfast.     gabapentin 300 MG capsule  Commonly known as: NEURONTIN  Take 2 capsules (600 mg total) by mouth 2 (two) times daily.     JARDIANCE 10 mg tablet  Generic drug: empagliflozin  Take 10 mg by mouth nightly.     multivitamin per tablet  Commonly known as: THERAGRAN  Take 1 tablet by mouth once daily.     ONETOUCH DELICA PLUS LANCET 33 gauge Misc  Generic drug: lancets     ONETOUCH ULTRA TEST Strp  Generic drug:  blood sugar diagnostic  USE TO CHECK BLOOD GLUCOSE LEVELS DAILY AS DIRECTED     pantoprazole 40 MG tablet  Commonly known as: PROTONIX  Take 1 tablet (40 mg total) by mouth once daily.     rosuvastatin 10 MG tablet  Commonly known as: CRESTOR  Take 1 tablet (10 mg total) by mouth once daily.     vitamin E 200 UNIT capsule  Take 200 Units by mouth once daily.     zinc gluconate 50 mg tablet  Take 50 mg by mouth once daily.            STOP taking these medications      chlorhexidine 0.12 % solution  Commonly known as: PERIDEX     meloxicam 15 MG tablet  Commonly known as: MOBIC            ASK your doctor about these medications      conjugated estrogens vaginal cream  Commonly known as: PREMARIN  Place 0.5 g vaginally twice a week. Apply small amount to gloved finger and apply to vaginal walls.                 DISCHARGE INSTRUCTIONS:  Patient instructed to remain weightbearing as tolerated to the right lower extremity.  Call or return to the emergency room immediately if any worsening conditions.  Patient voiced understanding.

## 2023-01-07 ENCOUNTER — DOCUMENTATION ONLY (OUTPATIENT)
Dept: INTERNAL MEDICINE | Facility: CLINIC | Age: 68
End: 2023-01-07
Payer: MEDICARE

## 2023-01-10 ENCOUNTER — PATIENT MESSAGE (OUTPATIENT)
Dept: ADMINISTRATIVE | Facility: OTHER | Age: 68
End: 2023-01-10
Payer: MEDICARE

## 2023-01-11 DIAGNOSIS — Z96.651 HISTORY OF TOTAL KNEE REPLACEMENT, RIGHT: Primary | ICD-10-CM

## 2023-01-11 RX ORDER — METHOCARBAMOL 750 MG/1
750 TABLET, FILM COATED ORAL EVERY 8 HOURS PRN
Qty: 42 TABLET | Refills: 0 | OUTPATIENT
Start: 2023-01-11 | End: 2023-01-11 | Stop reason: SDUPTHER

## 2023-01-11 RX ORDER — HYDROCODONE BITARTRATE AND ACETAMINOPHEN 7.5; 325 MG/1; MG/1
1 TABLET ORAL EVERY 4 HOURS PRN
Qty: 42 TABLET | Refills: 0 | Status: SHIPPED | OUTPATIENT
Start: 2023-01-11 | End: 2023-01-18 | Stop reason: SDUPTHER

## 2023-01-11 RX ORDER — METHOCARBAMOL 750 MG/1
750 TABLET, FILM COATED ORAL EVERY 8 HOURS PRN
Qty: 42 TABLET | Refills: 0 | Status: SHIPPED | OUTPATIENT
Start: 2023-01-11 | End: 2023-01-25

## 2023-01-11 RX ORDER — HYDROCODONE BITARTRATE AND ACETAMINOPHEN 7.5; 325 MG/1; MG/1
1 TABLET ORAL EVERY 4 HOURS PRN
Qty: 42 TABLET | Refills: 0 | Status: SHIPPED | OUTPATIENT
Start: 2023-01-11 | End: 2023-01-11 | Stop reason: SDUPTHER

## 2023-01-11 NOTE — TELEPHONE ENCOUNTER
Patient called to request a refill on pain medication and muscle relaxer due to running out over the weekend    Spoke to patient at 1215 and advised her if she doesn't have it by tomorrow afternoon to call and I will look into it.     Pt verbalized understanding.

## 2023-01-12 ENCOUNTER — PATIENT MESSAGE (OUTPATIENT)
Dept: ADMINISTRATIVE | Facility: OTHER | Age: 68
End: 2023-01-12
Payer: MEDICARE

## 2023-01-13 ENCOUNTER — TELEPHONE (OUTPATIENT)
Dept: ORTHOPEDICS | Facility: CLINIC | Age: 68
End: 2023-01-13
Payer: MEDICARE

## 2023-01-13 NOTE — TELEPHONE ENCOUNTER
Patient called regarding her ankle having some redness. She reported that her therapist suggest we take a look.       1/13/23 @ 11:39 spoke with patient and asked if she could send us a picture to show Dr. Weaver/Haritha. Patient verbally understood.

## 2023-01-16 ENCOUNTER — PATIENT MESSAGE (OUTPATIENT)
Dept: ADMINISTRATIVE | Facility: OTHER | Age: 68
End: 2023-01-16
Payer: MEDICARE

## 2023-01-18 ENCOUNTER — OFFICE VISIT (OUTPATIENT)
Dept: ORTHOPEDICS | Facility: CLINIC | Age: 68
End: 2023-01-18
Payer: MEDICARE

## 2023-01-18 VITALS — WEIGHT: 172 LBS | BODY MASS INDEX: 27 KG/M2 | HEIGHT: 67 IN

## 2023-01-18 DIAGNOSIS — Z96.651 HISTORY OF TOTAL KNEE REPLACEMENT, RIGHT: Primary | ICD-10-CM

## 2023-01-18 PROCEDURE — 1101F PT FALLS ASSESS-DOCD LE1/YR: CPT | Mod: CPTII,,,

## 2023-01-18 PROCEDURE — 3288F FALL RISK ASSESSMENT DOCD: CPT | Mod: CPTII,,,

## 2023-01-18 PROCEDURE — 1160F PR REVIEW ALL MEDS BY PRESCRIBER/CLIN PHARMACIST DOCUMENTED: ICD-10-PCS | Mod: CPTII,,,

## 2023-01-18 PROCEDURE — 3008F BODY MASS INDEX DOCD: CPT | Mod: CPTII,,,

## 2023-01-18 PROCEDURE — 99024 POSTOP FOLLOW-UP VISIT: CPT | Mod: ,,,

## 2023-01-18 PROCEDURE — 99024 PR POST-OP FOLLOW-UP VISIT: ICD-10-PCS | Mod: ,,,

## 2023-01-18 PROCEDURE — 1160F RVW MEDS BY RX/DR IN RCRD: CPT | Mod: CPTII,,,

## 2023-01-18 PROCEDURE — 3008F PR BODY MASS INDEX (BMI) DOCUMENTED: ICD-10-PCS | Mod: CPTII,,,

## 2023-01-18 PROCEDURE — 1101F PR PT FALLS ASSESS DOC 0-1 FALLS W/OUT INJ PAST YR: ICD-10-PCS | Mod: CPTII,,,

## 2023-01-18 PROCEDURE — 1159F MED LIST DOCD IN RCRD: CPT | Mod: CPTII,,,

## 2023-01-18 PROCEDURE — 3288F PR FALLS RISK ASSESSMENT DOCUMENTED: ICD-10-PCS | Mod: CPTII,,,

## 2023-01-18 PROCEDURE — 1159F PR MEDICATION LIST DOCUMENTED IN MEDICAL RECORD: ICD-10-PCS | Mod: CPTII,,,

## 2023-01-18 RX ORDER — HYDROCODONE BITARTRATE AND ACETAMINOPHEN 7.5; 325 MG/1; MG/1
1 TABLET ORAL EVERY 6 HOURS PRN
Qty: 28 TABLET | Refills: 0 | Status: SHIPPED | OUTPATIENT
Start: 2023-01-18 | End: 2023-01-25 | Stop reason: SDUPTHER

## 2023-01-18 NOTE — PROGRESS NOTES
Subjective:    CC: Post-op Evaluation of the Right Knee and Post-op Evaluation (post op R TKA 1/3/23 - 4/3/23 - pt states that her knee does hurt, she is in outpt therapy - ambulating with a walker - td)       HPI:  Patient returns to clinic for follow up of a right total knee arthroplasty. Approximately 2 weeks out. The patient states her pain is well managed on current narcotics for like a refill today. Denies signs of infection. The patient is working with a physical therapist and making good progress. Ambulating with a walker.  Taking ASA 81 b.i.d. for DVT prophylaxis and wearing Marshall hose.  No new complaints.    ROS: Refer to HPI for pertinent ROS. All other 12 point systems negative.    Objective:    There were no vitals filed for this visit.     Physical Exam:  Right lower extremity compartments are soft and warm.  There are no signs or symptoms of DVT or infection. Incisions are well-healed. Staples have been removed and steri strips applied. Knee ROM is 0-95 degrees today.  The patient is appropriately tender to palpation. The patella is tracking appropriately. The knee is stable to stressing. Neurovascularly intact distally.          Images: X-rays: 3 views of the right knee demonstrate a well aligned total knee arthroplasty without evidence of loosening or infection. Images Reviewed and discussed with patient.    Assessment:  1. History of total knee replacement, right  - X-Ray Knee 3 View Right; Future  - HYDROcodone-acetaminophen (NORCO) 7.5-325 mg per tablet; Take 1 tablet by mouth every 6 (six) hours as needed for Pain.  Dispense: 28 tablet; Refill: 0       Plan:  Physical exam, x-rays, and intraoperative findings were discussed with the patient. Wound care instructions given. The Patient was instructed to take pain medication as needed with appropriate precautions and continue PT to gain strength and ROM. I would like to see the patient back in 4 weeks to assess the patients progress.    Follow up:  Follow up in about 4 weeks (around 2/15/2023).

## 2023-01-19 ENCOUNTER — TELEPHONE (OUTPATIENT)
Dept: FAMILY MEDICINE | Facility: CLINIC | Age: 68
End: 2023-01-19
Payer: MEDICARE

## 2023-01-21 ENCOUNTER — PATIENT MESSAGE (OUTPATIENT)
Dept: ADMINISTRATIVE | Facility: OTHER | Age: 68
End: 2023-01-21
Payer: MEDICARE

## 2023-01-24 ENCOUNTER — PATIENT MESSAGE (OUTPATIENT)
Dept: ADMINISTRATIVE | Facility: OTHER | Age: 68
End: 2023-01-24
Payer: MEDICARE

## 2023-01-25 ENCOUNTER — TELEPHONE (OUTPATIENT)
Dept: ORTHOPEDICS | Facility: CLINIC | Age: 68
End: 2023-01-25
Payer: MEDICARE

## 2023-01-25 DIAGNOSIS — Z96.651 HISTORY OF TOTAL KNEE REPLACEMENT, RIGHT: ICD-10-CM

## 2023-01-25 RX ORDER — HYDROCODONE BITARTRATE AND ACETAMINOPHEN 7.5; 325 MG/1; MG/1
1 TABLET ORAL EVERY 8 HOURS PRN
Qty: 21 TABLET | Refills: 0 | Status: SHIPPED | OUTPATIENT
Start: 2023-01-25 | End: 2023-01-31 | Stop reason: SDUPTHER

## 2023-01-27 ENCOUNTER — TELEPHONE (OUTPATIENT)
Dept: ORTHOPEDICS | Facility: CLINIC | Age: 68
End: 2023-01-27
Payer: MEDICARE

## 2023-01-27 DIAGNOSIS — Z96.651 HISTORY OF TOTAL KNEE REPLACEMENT, RIGHT: Primary | ICD-10-CM

## 2023-01-27 RX ORDER — CEPHALEXIN 500 MG/1
500 CAPSULE ORAL EVERY 12 HOURS
Qty: 20 CAPSULE | Refills: 0 | Status: SHIPPED | OUTPATIENT
Start: 2023-01-27 | End: 2023-06-12 | Stop reason: ALTCHOICE

## 2023-01-27 NOTE — TELEPHONE ENCOUNTER
Called pt to inform her that abx were sent to her pharmacy. She verbalized understanding.     Advised if the pain gets worse to go to ER over the weekend. Pt verbalized understanding.

## 2023-01-27 NOTE — TELEPHONE ENCOUNTER
Pt called with concerns about her incision near her ankle being red. She is concerned for a blood clot.     Advised her to send me a picture to show the docs. Informed her that I will call her once I have gotten a response.     Pt verbalized understanding.

## 2023-01-31 DIAGNOSIS — Z96.651 HISTORY OF TOTAL KNEE REPLACEMENT, RIGHT: ICD-10-CM

## 2023-01-31 RX ORDER — HYDROCODONE BITARTRATE AND ACETAMINOPHEN 7.5; 325 MG/1; MG/1
1 TABLET ORAL EVERY 8 HOURS PRN
Qty: 21 TABLET | Refills: 0 | Status: SHIPPED | OUTPATIENT
Start: 2023-01-31 | End: 2023-02-07

## 2023-01-31 RX ORDER — METHOCARBAMOL 500 MG/1
500 TABLET, FILM COATED ORAL 3 TIMES DAILY
Qty: 21 TABLET | Refills: 0 | Status: SHIPPED | OUTPATIENT
Start: 2023-01-31 | End: 2023-02-07

## 2023-01-31 NOTE — TELEPHONE ENCOUNTER
Patient called for a refill. Advised her that you were in surgery.     Advised her that her pharmacy will call when her medications are ready.     Pt verbalized understanding.

## 2023-02-10 ENCOUNTER — PATIENT MESSAGE (OUTPATIENT)
Dept: ADMINISTRATIVE | Facility: HOSPITAL | Age: 68
End: 2023-02-10
Payer: MEDICARE

## 2023-02-15 ENCOUNTER — OFFICE VISIT (OUTPATIENT)
Dept: ORTHOPEDICS | Facility: CLINIC | Age: 68
End: 2023-02-15
Payer: MEDICARE

## 2023-02-15 DIAGNOSIS — Z96.651 HISTORY OF TOTAL KNEE REPLACEMENT, RIGHT: Primary | ICD-10-CM

## 2023-02-15 PROCEDURE — 1160F RVW MEDS BY RX/DR IN RCRD: CPT | Mod: CPTII,,, | Performed by: ORTHOPAEDIC SURGERY

## 2023-02-15 PROCEDURE — 1125F PR PAIN SEVERITY QUANTIFIED, PAIN PRESENT: ICD-10-PCS | Mod: CPTII,,, | Performed by: ORTHOPAEDIC SURGERY

## 2023-02-15 PROCEDURE — 4010F ACE/ARB THERAPY RXD/TAKEN: CPT | Mod: CPTII,,, | Performed by: ORTHOPAEDIC SURGERY

## 2023-02-15 PROCEDURE — 1159F PR MEDICATION LIST DOCUMENTED IN MEDICAL RECORD: ICD-10-PCS | Mod: CPTII,,, | Performed by: ORTHOPAEDIC SURGERY

## 2023-02-15 PROCEDURE — 1101F PT FALLS ASSESS-DOCD LE1/YR: CPT | Mod: CPTII,,, | Performed by: ORTHOPAEDIC SURGERY

## 2023-02-15 PROCEDURE — 1160F PR REVIEW ALL MEDS BY PRESCRIBER/CLIN PHARMACIST DOCUMENTED: ICD-10-PCS | Mod: CPTII,,, | Performed by: ORTHOPAEDIC SURGERY

## 2023-02-15 PROCEDURE — 99024 PR POST-OP FOLLOW-UP VISIT: ICD-10-PCS | Mod: ,,, | Performed by: ORTHOPAEDIC SURGERY

## 2023-02-15 PROCEDURE — 1125F AMNT PAIN NOTED PAIN PRSNT: CPT | Mod: CPTII,,, | Performed by: ORTHOPAEDIC SURGERY

## 2023-02-15 PROCEDURE — 3288F FALL RISK ASSESSMENT DOCD: CPT | Mod: CPTII,,, | Performed by: ORTHOPAEDIC SURGERY

## 2023-02-15 PROCEDURE — 1159F MED LIST DOCD IN RCRD: CPT | Mod: CPTII,,, | Performed by: ORTHOPAEDIC SURGERY

## 2023-02-15 PROCEDURE — 1101F PR PT FALLS ASSESS DOC 0-1 FALLS W/OUT INJ PAST YR: ICD-10-PCS | Mod: CPTII,,, | Performed by: ORTHOPAEDIC SURGERY

## 2023-02-15 PROCEDURE — 99024 POSTOP FOLLOW-UP VISIT: CPT | Mod: ,,, | Performed by: ORTHOPAEDIC SURGERY

## 2023-02-15 PROCEDURE — 4010F PR ACE/ARB THEARPY RXD/TAKEN: ICD-10-PCS | Mod: CPTII,,, | Performed by: ORTHOPAEDIC SURGERY

## 2023-02-15 PROCEDURE — 3288F PR FALLS RISK ASSESSMENT DOCUMENTED: ICD-10-PCS | Mod: CPTII,,, | Performed by: ORTHOPAEDIC SURGERY

## 2023-02-15 NOTE — PROGRESS NOTES
Subjective:    CC: Follow-up of the Right Knee and Follow-up (post op Rt TKA 1/3/23-4/3/23 pt just left PT so feeling some soreness and burning, took tylenol and muscle relaxer this morning, pt goes to PT 3 times a week states is helping her. pt ices knee after therapy and helps the pain. pt takes pain meds)       HPI:  Patient returns today for repeat exam.  Patient is 6 weeks from right total knee arthroplasty.  She continues to improve, she is going going to physical therapy, she is not walking with any assistive devices today.    ROS: Refer to HPI for pertinent ROS. All other 12 point systems negative.    Objective:  There were no vitals filed for this visit.     Physical Exam:  Right lower extremity compartment soft and warm.  Skin is intact.  There is no signs symptoms of DVT or infection.  Her incisions well healed there is no swelling there is no erythema her motion is 0-110 degrees she is stable to stressing, she is neurovascular intact distally.    Images: . Images Reviewed and discussed with patient.    Assessment:  1. History of total knee replacement, right        Plan:  At this time we discussed her physical exam and intraoperative findings, she continues to heal very nicely.  She will continue her physical therapy to regain her strength and motion.  I would like see back in 6 weeks to see how she is progressing.    Follow UP: No follow-ups on file.

## 2023-02-23 ENCOUNTER — PATIENT OUTREACH (OUTPATIENT)
Dept: ADMINISTRATIVE | Facility: HOSPITAL | Age: 68
End: 2023-02-23
Payer: MEDICARE

## 2023-02-23 NOTE — LETTER
AUTHORIZATION FOR RELEASE OF   CONFIDENTIAL INFORMATION    Dear [facility],    We are seeing Rhoda Cesar, date of birth 1955, in the clinic at [facility]. BRUNO Darden is the patient's PCP. Rhoda Cesar has an outstanding lab/procedure at the time we reviewed her chart. In order to help keep her health information updated, she has authorized us to request the following medical record(s):        (  )  MAMMOGRAM                                      (  )  COLONOSCOPY      (  )  PAP SMEAR                                          (  )  OUTSIDE LAB RESULTS     (  )  DEXA SCAN                                          ( X )DIABETIC  EYE EXAM            (  )  FOOT EXAM                                          (  )  ENTIRE RECORD     (  )  OUTSIDE IMMUNIZATIONS                 (  )  _______________         Please fax records to [facility], Christi D Carmouche, FNP, [phone]       If you have any questions, please contact Cece at [phone]           Patient Name: Rhoda Cesar  : 1955  Patient Phone #: [phone]

## 2023-02-23 NOTE — PROGRESS NOTES
The following record(s)  below were uploaded for Health Maintenance .    DM EYE EXAM 07/16/2019 @ [facility]     Called patient to see if she had a more up to date exam, no answer.

## 2023-03-02 ENCOUNTER — PATIENT MESSAGE (OUTPATIENT)
Dept: ADMINISTRATIVE | Facility: HOSPITAL | Age: 68
End: 2023-03-02
Payer: MEDICARE

## 2023-03-11 ENCOUNTER — LAB VISIT (OUTPATIENT)
Dept: LAB | Facility: HOSPITAL | Age: 68
End: 2023-03-11
Attending: NURSE PRACTITIONER
Payer: MEDICARE

## 2023-03-11 DIAGNOSIS — E11.9 TYPE 2 DIABETES MELLITUS WITHOUT COMPLICATION, UNSPECIFIED WHETHER LONG TERM INSULIN USE: ICD-10-CM

## 2023-03-11 LAB
EST. AVERAGE GLUCOSE BLD GHB EST-MCNC: 142.7 MG/DL
HBA1C MFR BLD: 6.6 %

## 2023-03-11 PROCEDURE — 36415 COLL VENOUS BLD VENIPUNCTURE: CPT

## 2023-03-11 PROCEDURE — 83036 HEMOGLOBIN GLYCOSYLATED A1C: CPT

## 2023-03-13 ENCOUNTER — OFFICE VISIT (OUTPATIENT)
Dept: FAMILY MEDICINE | Facility: CLINIC | Age: 68
End: 2023-03-13
Payer: MEDICARE

## 2023-03-13 VITALS
HEIGHT: 67 IN | OXYGEN SATURATION: 99 % | TEMPERATURE: 98 F | RESPIRATION RATE: 18 BRPM | WEIGHT: 168.88 LBS | SYSTOLIC BLOOD PRESSURE: 112 MMHG | BODY MASS INDEX: 26.51 KG/M2 | HEART RATE: 69 BPM | DIASTOLIC BLOOD PRESSURE: 72 MMHG

## 2023-03-13 DIAGNOSIS — Z00.00 WELL ADULT EXAM: Primary | ICD-10-CM

## 2023-03-13 DIAGNOSIS — E11.9 TYPE 2 DIABETES MELLITUS WITHOUT COMPLICATION, UNSPECIFIED WHETHER LONG TERM INSULIN USE: ICD-10-CM

## 2023-03-13 DIAGNOSIS — M25.562 LEFT KNEE PAIN, UNSPECIFIED CHRONICITY: ICD-10-CM

## 2023-03-13 PROCEDURE — 3288F PR FALLS RISK ASSESSMENT DOCUMENTED: ICD-10-PCS | Mod: ,,, | Performed by: NURSE PRACTITIONER

## 2023-03-13 PROCEDURE — 3078F DIAST BP <80 MM HG: CPT | Mod: ,,, | Performed by: NURSE PRACTITIONER

## 2023-03-13 PROCEDURE — 3288F FALL RISK ASSESSMENT DOCD: CPT | Mod: ,,, | Performed by: NURSE PRACTITIONER

## 2023-03-13 PROCEDURE — 3008F BODY MASS INDEX DOCD: CPT | Mod: ,,, | Performed by: NURSE PRACTITIONER

## 2023-03-13 PROCEDURE — 3074F PR MOST RECENT SYSTOLIC BLOOD PRESSURE < 130 MM HG: ICD-10-PCS | Mod: ,,, | Performed by: NURSE PRACTITIONER

## 2023-03-13 PROCEDURE — 99213 OFFICE O/P EST LOW 20 MIN: CPT | Mod: ,,, | Performed by: NURSE PRACTITIONER

## 2023-03-13 PROCEDURE — 1125F PR PAIN SEVERITY QUANTIFIED, PAIN PRESENT: ICD-10-PCS | Mod: ,,, | Performed by: NURSE PRACTITIONER

## 2023-03-13 PROCEDURE — 4010F PR ACE/ARB THEARPY RXD/TAKEN: ICD-10-PCS | Mod: ,,, | Performed by: NURSE PRACTITIONER

## 2023-03-13 PROCEDURE — 3008F PR BODY MASS INDEX (BMI) DOCUMENTED: ICD-10-PCS | Mod: ,,, | Performed by: NURSE PRACTITIONER

## 2023-03-13 PROCEDURE — 99213 PR OFFICE/OUTPT VISIT, EST, LEVL III, 20-29 MIN: ICD-10-PCS | Mod: ,,, | Performed by: NURSE PRACTITIONER

## 2023-03-13 PROCEDURE — 3074F SYST BP LT 130 MM HG: CPT | Mod: ,,, | Performed by: NURSE PRACTITIONER

## 2023-03-13 PROCEDURE — 1101F PR PT FALLS ASSESS DOC 0-1 FALLS W/OUT INJ PAST YR: ICD-10-PCS | Mod: ,,, | Performed by: NURSE PRACTITIONER

## 2023-03-13 PROCEDURE — 1101F PT FALLS ASSESS-DOCD LE1/YR: CPT | Mod: ,,, | Performed by: NURSE PRACTITIONER

## 2023-03-13 PROCEDURE — 1125F AMNT PAIN NOTED PAIN PRSNT: CPT | Mod: ,,, | Performed by: NURSE PRACTITIONER

## 2023-03-13 PROCEDURE — 4010F ACE/ARB THERAPY RXD/TAKEN: CPT | Mod: ,,, | Performed by: NURSE PRACTITIONER

## 2023-03-13 PROCEDURE — 3078F PR MOST RECENT DIASTOLIC BLOOD PRESSURE < 80 MM HG: ICD-10-PCS | Mod: ,,, | Performed by: NURSE PRACTITIONER

## 2023-03-13 RX ORDER — CYCLOBENZAPRINE HCL 5 MG
5 TABLET ORAL NIGHTLY PRN
Qty: 30 TABLET | Refills: 1 | Status: SHIPPED | OUTPATIENT
Start: 2023-03-13 | End: 2023-05-04

## 2023-03-13 RX ORDER — MELOXICAM 7.5 MG/1
7.5 TABLET ORAL DAILY
Qty: 30 TABLET | Refills: 11 | Status: SHIPPED | OUTPATIENT
Start: 2023-03-13 | End: 2024-02-29

## 2023-03-13 NOTE — PROGRESS NOTES
Patient ID: [id number]     Chief Complaint: Diabetes (3 mo fu)      HPI:     Rhoda Cesar is a 67 y.o. female here today for a follow up.  Patient is status post right knee replacement performed on January 3rd with Dr. Weaver.  Patient is currently in physical therapy 2 to 3 times a week.  Patient reports right knee pain with ambulation.  Patient rates pain 4/10 in office.  Patient reports minimal pain relief with Tylenol OTC as directed.      Past Medical History:  has a past medical history of Arthritis, Diabetes mellitus, Digestive disorder, Hypertension, and Thyroid disease.    Surgical History:  has a past surgical history that includes Cholecystectomy; Knee arthroscopy; Tubal ligation; Appendectomy; Colonoscopy; and robotic arthroplasty, knee (Right, 1/3/2023).    Family History: family history includes Arrhythmia in her brother; Arthritis in her daughter and mother; Cancer in her mother; Cervical cancer in her sister; Deep vein thrombosis in her sister; Diabetes in her mother and sister; Heart attack in her father; Heart failure in her brother and mother; Peripheral vascular disease in her father; Stroke in her brother, father, mother, and sister; Ulcerative colitis in her sister; Ulcers in her father.    Social History:  reports that she has never smoked. She has never used smokeless tobacco. She reports that she does not currently use alcohol. She reports that she does not currently use drugs.    Current Outpatient Medications   Medication Instructions    acetaminophen (TYLENOL) 650 mg, Oral, 2 times daily    ascorbic acid (vitamin C) (VITAMIN C) 500 mg, Oral, Daily    aspirin (ECOTRIN) 81 mg, Oral, Every 12 hours    blood-glucose meter (ONETOUCH ULTRA2 METER) Misc USE TO CHECK BLOOD GLUCOSE LEVELS ONCE DAILY    cephALEXin (KEFLEX) 500 mg, Oral, Every 12 hours    cetirizine (ZYRTEC) 10 MG tablet TAKE ONE TABLET BY MOUTH EVERY DAY    conjugated estrogens (PREMARIN) 0.5 g, Vaginal, Twice weekly,  "Apply small amount to gloved finger and apply to vaginal walls.    cyclobenzaprine (FLEXERIL) 5 mg, Oral, Nightly PRN    ferrous sulfate (FEOSOL) Tab tablet 1 tablet, Oral, With breakfast    gabapentin (NEURONTIN) 600 mg, Oral, 2 times daily    JARDIANCE 10 mg, Oral, Nightly    lisinopriL 10 MG tablet TAKE ONE TABLET BY MOUTH EVERY DAY    meloxicam (MOBIC) 7.5 mg, Oral, Daily    multivitamin (THERAGRAN) per tablet 1 tablet, Oral, Daily    ONETOUCH DELICA PLUS LANCET 33 gauge Misc USE DAILY AND AS NEEDED FOR BLOOD GLUCOSE TESTING    ONETOUCH ULTRA TEST Strp USE TO CHECK BLOOD GLUCOSE LEVELS DAILY AS DIRECTED    pantoprazole (PROTONIX) 40 MG tablet TAKE ONE TABLET BY MOUTH EVERY DAY    rosuvastatin (CRESTOR) 10 mg, Oral, Daily    SYNTHROID 150 mcg tablet TAKE ONE TABLET BY MOUTH EVERY DAY    vitamin E 200 Units, Oral, Daily    zinc gluconate 50 mg, Oral, Daily       Patient is allergic to sulfa (sulfonamide antibiotics) and codeine.     Patient Care Team:  BRUNO Darden as PCP - General (Nurse Practitioner)       Subjective:     Review of Systems    12 point review of systems conducted, negative except as stated in the history of present illness. See HPI for details.      Objective:     Visit Vitals  /72 (BP Location: Left arm, Patient Position: Sitting)   Pulse 69   Temp 98.2 °F (36.8 °C) (Oral)   Resp 18   Ht 5' 7" (1.702 m)   Wt 76.6 kg (168 lb 14.4 oz)   SpO2 99%   BMI 26.45 kg/m²       Physical Exam    General: Alert and oriented, No acute distress.  Head: Normocephalic, Atraumatic.  Eye: Pupils are equal, round and reactive to light, Extraocular movements are intact, Sclera non-icteric.  Ears/Nose/Throat: Normal, Mucosa moist,Clear.  Neck/Thyroid: Supple, Non-tender, No carotid bruit, No lymphadenopathy, No JVD, Full range of motion.  Respiratory: Clear to auscultation bilaterally; No wheezes, rales or rhonchi,Non-labored respirations, Symmetrical chest wall " expansion.  Cardiovascular: Regular rate and rhythm, S1/S2 normal, No murmurs, rubs or gallops.  Gastrointestinal: Soft, Non-tender, Non-distended, Normal bowel sounds, No palpable organomegaly.  Musculoskeletal:  Decreased range of motion to right lower extremity.  Integumentary: Warm, Dry, Intact, No suspicious lesions or rashes.  Extremities: No clubbing, cyanosis or edema  Neurologic: No focal deficits, Cranial Nerves II-XII are grossly intact, Motor strength normal upper and lower extremities, Sensory exam intact.  Psychiatric: Normal interaction, Coherent speech, Euthymic mood, Appropriate affect     Labs Reviewed:     Chemistry:  Lab Results   Component Value Date     01/05/2023    K 4.1 01/05/2023    CHLORIDE 109 (H) 01/05/2023    BUN 14.0 01/05/2023    CREATININE 0.61 01/05/2023    EGFRNORACEVR >90 01/05/2023    GLUCOSE 131 (H) 01/05/2023    CALCIUM 8.9 01/05/2023    ALKPHOS 83 12/12/2022    LABPROT 7.2 12/12/2022    ALBUMIN 4.2 12/12/2022    BILIDIR 0.4 06/14/2021    IBILI 0.60 06/14/2021    AST 14 12/12/2022    ALT 21 12/12/2022    MG 2.0 09/01/2019    XNMULXCO30CB 47.0 06/18/2022    TSH 2.5800 06/18/2022    IEPZLK5YXZK 1.30 01/04/2020        Lab Results   Component Value Date    HGBA1C 6.6 03/11/2023        Hematology:  Lab Results   Component Value Date    WBC 7.5 01/05/2023    HGB 11.5 (L) 01/05/2023    HCT 37.2 01/05/2023     01/05/2023       Lipid Panel:  Lab Results   Component Value Date    CHOL 148 12/12/2022    HDL 46 12/12/2022    LDL 85.00 12/12/2022    TRIG 87 12/12/2022    TOTALCHOLEST 3 12/12/2022        Urine:  Lab Results   Component Value Date    COLORUA Yellow 12/12/2022    APPEARANCEUA Hazy (A) 12/12/2022    SGUA 1.020 12/12/2022    PHUA 6.0 12/12/2022    PROTEINUA Negative 12/12/2022    GLUCOSEUA >=1000 (A) 12/12/2022    KETONESUA Negative 12/12/2022    BLOODUA Negative 12/12/2022    NITRITESUA Negative 12/12/2022    LEUKOCYTESUR Small (A) 12/12/2022    RBCUA None Seen  12/12/2022    WBCUA 21-50 (A) 12/12/2022    BACTERIA Few (A) 12/12/2022    SQEPUA  (A) 09/24/2019    HYALINECASTS 0 09/24/2019    CREATRANDUR 64.0 04/16/2019          Assessment:       ICD-10-CM ICD-9-CM   1. Well adult exam  Z00.00 V70.0   2. Type 2 diabetes mellitus without complication, unspecified whether long term insulin use  E11.9 250.00   3. Left knee pain, unspecified chronicity  M25.562 719.46        Plan:   1. Type 2 diabetes mellitus without complication, unspecified whether long term insulin use  Controlled.  Hemoglobin A1c 6.6.  Continue current medication management.  ADA diet advised.    2. Left knee pain, unspecified chronicity  Meloxicam 7.5 mg p.o. daily resumed.  Cyclobenzaprine 5 mg p.o. at bedtime.  Continue physical therapy as ordered by ortho.  Keep scheduled follow-up appointment with ortho next month.    3. Well adult exam  - CBC Auto Differential; Future  - Comprehensive Metabolic Panel; Future  - Lipid Panel; Future  - TSH; Future  - Hemoglobin A1C; Future  - Microalbumin/Creatinine Ratio, Urine; Future  - Vitamin D; Future    Follow up in about 3 months (around 6/13/2023) for Wellness. In addition to their scheduled follow up, the patient has also been instructed to follow up on as needed basis.     Future Appointments   Date Time Provider Department Center   4/19/2023  9:15 AM Dev Weaver MD [facility]THO [facility]   6/12/2023  8:20 AM BRUNO Darden [facility]   9/27/2023  9:10 AM CATHLEEN Brito [facility] GYN [place]        BRUNO Darden

## 2023-04-19 ENCOUNTER — OFFICE VISIT (OUTPATIENT)
Dept: ORTHOPEDICS | Facility: CLINIC | Age: 68
End: 2023-04-19
Payer: MEDICARE

## 2023-04-19 VITALS — WEIGHT: 168 LBS | BODY MASS INDEX: 26.37 KG/M2 | HEIGHT: 67 IN

## 2023-04-19 DIAGNOSIS — Z96.651 HISTORY OF TOTAL KNEE REPLACEMENT, RIGHT: Primary | ICD-10-CM

## 2023-04-19 PROCEDURE — 4010F PR ACE/ARB THEARPY RXD/TAKEN: ICD-10-PCS | Mod: CPTII,,,

## 2023-04-19 PROCEDURE — 1101F PR PT FALLS ASSESS DOC 0-1 FALLS W/OUT INJ PAST YR: ICD-10-PCS | Mod: CPTII,,,

## 2023-04-19 PROCEDURE — 99024 PR POST-OP FOLLOW-UP VISIT: ICD-10-PCS | Mod: ,,,

## 2023-04-19 PROCEDURE — 4010F ACE/ARB THERAPY RXD/TAKEN: CPT | Mod: CPTII,,,

## 2023-04-19 PROCEDURE — 1160F PR REVIEW ALL MEDS BY PRESCRIBER/CLIN PHARMACIST DOCUMENTED: ICD-10-PCS | Mod: CPTII,,,

## 2023-04-19 PROCEDURE — 1159F MED LIST DOCD IN RCRD: CPT | Mod: CPTII,,,

## 2023-04-19 PROCEDURE — 1160F RVW MEDS BY RX/DR IN RCRD: CPT | Mod: CPTII,,,

## 2023-04-19 PROCEDURE — 1159F PR MEDICATION LIST DOCUMENTED IN MEDICAL RECORD: ICD-10-PCS | Mod: CPTII,,,

## 2023-04-19 PROCEDURE — 99024 POSTOP FOLLOW-UP VISIT: CPT | Mod: ,,,

## 2023-04-19 PROCEDURE — 1101F PT FALLS ASSESS-DOCD LE1/YR: CPT | Mod: CPTII,,,

## 2023-04-19 PROCEDURE — 3008F BODY MASS INDEX DOCD: CPT | Mod: CPTII,,,

## 2023-04-19 PROCEDURE — 3288F FALL RISK ASSESSMENT DOCD: CPT | Mod: CPTII,,,

## 2023-04-19 PROCEDURE — 3288F PR FALLS RISK ASSESSMENT DOCUMENTED: ICD-10-PCS | Mod: CPTII,,,

## 2023-04-19 PROCEDURE — 3008F PR BODY MASS INDEX (BMI) DOCUMENTED: ICD-10-PCS | Mod: CPTII,,,

## 2023-04-19 NOTE — PROGRESS NOTES
Subjective:    CC: Follow-up of the Right Knee and Follow-up (R TKA 1/3/23 - pt states that her knee has been throbbing at night on the medial and lateral side of her knee going down the lateral side of her leg - td)       HPI:  Patient presents to clinic for repeat evaluation of right knee.  Status post TKA on 01/03/2023.  She is approximately 3-1/2 months out.  She states that her motion is okay but her knee begins to throb mostly about the thigh as well as medial and lateral aspect of the knee into the lateral aspect of the lower leg after a long day of work.  She does have some slight difficulty going up and down stairs.  She feels it is musculature in nature.  She has stopped physical therapy but feels she may benefit from resuming this.  Currently taking Tylenol as needed as she was told this is the only medicine she can take.  Ambulating unassisted today in clinic.    ROS: Refer to HPI for pertinent ROS. All other 12 point systems negative.    Objective:    There were no vitals filed for this visit.     Physical Exam:  Right lower extremity compartments are soft and warm.  Skin is intact.  There are no signs or symptoms of a DVT or infection.  Her incisions are well healed.  She is mildly tender to palpation about the medial anterior aspect of the knee.  She states she feels a pulling sensation about the thigh and lateral leg aspect.  Right knee range of motion 0-115 degrees.  No joint effusion.  Negative Homans.  Patella tracking appropriately.  Neurovascularly intact distally.    Images:  Previous Images Reviewed and discussed with patient.    Assessment:  1. History of total knee replacement, right  - Ambulatory referral/consult to Physical/Occupational Therapy; Future       Plan:  Physical exam findings discussed with patient.  We will resume physical therapy and is likely to transition to the wellness program.  I believe her discomfort is muscular in nature.  Continue Tylenol as needed with appropriate  precautions.  I would like to see the patient back in 3 months to assess her progress.  Patient understands to call sooner with any problems or difficulties.    Follow up: Follow up in about 3 months (around 7/19/2023).

## 2023-04-24 ENCOUNTER — PATIENT MESSAGE (OUTPATIENT)
Dept: ADMINISTRATIVE | Facility: HOSPITAL | Age: 68
End: 2023-04-24
Payer: MEDICARE

## 2023-05-04 RX ORDER — CYCLOBENZAPRINE HCL 5 MG
TABLET ORAL
Qty: 30 TABLET | Refills: 3 | Status: SHIPPED | OUTPATIENT
Start: 2023-05-04 | End: 2023-08-25

## 2023-05-08 ENCOUNTER — TELEPHONE (OUTPATIENT)
Dept: FAMILY MEDICINE | Facility: CLINIC | Age: 68
End: 2023-05-08
Payer: MEDICARE

## 2023-05-15 ENCOUNTER — PATIENT MESSAGE (OUTPATIENT)
Dept: ADMINISTRATIVE | Facility: HOSPITAL | Age: 68
End: 2023-05-15
Payer: MEDICARE

## 2023-05-29 NOTE — PROGRESS NOTES
Population Health Outreach.    Diabetic Eye Exam due- Reports done @ [facility], record request sent.

## 2023-05-29 NOTE — PROGRESS NOTES
Population Health Outreach.    Diabetic Eye Exam due- Called patient to discuss - no answer, left voicemail.

## 2023-05-31 NOTE — PROGRESS NOTES
The following record(s)  below were uploaded for Health Maintenance .    DM EYE EXAM 11/12/2022

## 2023-06-04 DIAGNOSIS — Z76.0 MEDICATION REFILL: Primary | ICD-10-CM

## 2023-06-05 RX ORDER — CETIRIZINE HYDROCHLORIDE 10 MG/1
TABLET, FILM COATED ORAL
Qty: 30 TABLET | Refills: 11 | Status: SHIPPED | OUTPATIENT
Start: 2023-06-05 | End: 2024-03-11

## 2023-06-09 ENCOUNTER — LAB VISIT (OUTPATIENT)
Dept: LAB | Facility: HOSPITAL | Age: 68
End: 2023-06-09
Attending: NURSE PRACTITIONER
Payer: MEDICARE

## 2023-06-09 DIAGNOSIS — Z00.00 WELL ADULT EXAM: ICD-10-CM

## 2023-06-09 LAB
ALBUMIN SERPL-MCNC: 4.4 G/DL (ref 3.4–4.8)
ALBUMIN/GLOB SERPL: 1.4 RATIO (ref 1.1–2)
ALP SERPL-CCNC: 92 UNIT/L (ref 40–150)
ALT SERPL-CCNC: 19 UNIT/L (ref 0–55)
AST SERPL-CCNC: 17 UNIT/L (ref 5–34)
BASOPHILS # BLD AUTO: 0.01 X10(3)/MCL
BASOPHILS NFR BLD AUTO: 0.1 %
BILIRUBIN DIRECT+TOT PNL SERPL-MCNC: 0.9 MG/DL
BUN SERPL-MCNC: 15.1 MG/DL (ref 9.8–20.1)
CALCIUM SERPL-MCNC: 9.7 MG/DL (ref 8.4–10.2)
CHLORIDE SERPL-SCNC: 104 MMOL/L (ref 98–107)
CHOLEST SERPL-MCNC: 161 MG/DL
CHOLEST/HDLC SERPL: 3 {RATIO} (ref 0–5)
CO2 SERPL-SCNC: 26 MMOL/L (ref 23–31)
CREAT SERPL-MCNC: 0.74 MG/DL (ref 0.55–1.02)
CREAT UR-MCNC: 47.8 MG/DL (ref 47–110)
DEPRECATED CALCIDIOL+CALCIFEROL SERPL-MC: 52.6 NG/ML (ref 30–80)
EOSINOPHIL # BLD AUTO: 0.28 X10(3)/MCL (ref 0–0.9)
EOSINOPHIL NFR BLD AUTO: 3.4 %
ERYTHROCYTE [DISTWIDTH] IN BLOOD BY AUTOMATED COUNT: 13.1 % (ref 11.5–17)
EST. AVERAGE GLUCOSE BLD GHB EST-MCNC: 148.5 MG/DL
GFR SERPLBLD CREATININE-BSD FMLA CKD-EPI: >60 MLS/MIN/1.73/M2
GLOBULIN SER-MCNC: 3.1 GM/DL (ref 2.4–3.5)
GLUCOSE SERPL-MCNC: 142 MG/DL (ref 82–115)
HBA1C MFR BLD: 6.8 %
HCT VFR BLD AUTO: 49.1 % (ref 37–47)
HDLC SERPL-MCNC: 50 MG/DL (ref 35–60)
HGB BLD-MCNC: 15 G/DL (ref 12–16)
IMM GRANULOCYTES # BLD AUTO: 0.01 X10(3)/MCL (ref 0–0.04)
IMM GRANULOCYTES NFR BLD AUTO: 0.1 %
LDLC SERPL CALC-MCNC: 86 MG/DL (ref 50–140)
LYMPHOCYTES # BLD AUTO: 1.66 X10(3)/MCL (ref 0.6–4.6)
LYMPHOCYTES NFR BLD AUTO: 20.1 %
MCH RBC QN AUTO: 28.3 PG (ref 27–31)
MCHC RBC AUTO-ENTMCNC: 30.5 G/DL (ref 33–36)
MCV RBC AUTO: 92.6 FL (ref 80–94)
MICROALBUMIN UR-MCNC: 5.2 UG/ML
MICROALBUMIN/CREAT RATIO PNL UR: 10.9 MG/GM CR (ref 0–30)
MONOCYTES # BLD AUTO: 0.65 X10(3)/MCL (ref 0.1–1.3)
MONOCYTES NFR BLD AUTO: 7.9 %
NEUTROPHILS # BLD AUTO: 5.64 X10(3)/MCL (ref 2.1–9.2)
NEUTROPHILS NFR BLD AUTO: 68.4 %
PLATELET # BLD AUTO: 199 X10(3)/MCL (ref 130–400)
PMV BLD AUTO: 9.4 FL (ref 7.4–10.4)
POTASSIUM SERPL-SCNC: 5 MMOL/L (ref 3.5–5.1)
PROT SERPL-MCNC: 7.5 GM/DL (ref 5.8–7.6)
RBC # BLD AUTO: 5.3 X10(6)/MCL (ref 4.2–5.4)
SODIUM SERPL-SCNC: 142 MMOL/L (ref 136–145)
TRIGL SERPL-MCNC: 127 MG/DL (ref 37–140)
TSH SERPL-ACNC: 2.25 UIU/ML (ref 0.35–4.94)
VLDLC SERPL CALC-MCNC: 25 MG/DL
WBC # SPEC AUTO: 8.25 X10(3)/MCL (ref 4.5–11.5)

## 2023-06-09 PROCEDURE — 85025 COMPLETE CBC W/AUTO DIFF WBC: CPT

## 2023-06-09 PROCEDURE — 84443 ASSAY THYROID STIM HORMONE: CPT

## 2023-06-09 PROCEDURE — 36415 COLL VENOUS BLD VENIPUNCTURE: CPT

## 2023-06-09 PROCEDURE — 82306 VITAMIN D 25 HYDROXY: CPT

## 2023-06-09 PROCEDURE — 80061 LIPID PANEL: CPT

## 2023-06-09 PROCEDURE — 80053 COMPREHEN METABOLIC PANEL: CPT

## 2023-06-09 PROCEDURE — 83036 HEMOGLOBIN GLYCOSYLATED A1C: CPT

## 2023-06-09 PROCEDURE — 82043 UR ALBUMIN QUANTITATIVE: CPT

## 2023-06-12 ENCOUNTER — OFFICE VISIT (OUTPATIENT)
Dept: FAMILY MEDICINE | Facility: CLINIC | Age: 68
End: 2023-06-12
Payer: MEDICARE

## 2023-06-12 VITALS
OXYGEN SATURATION: 97 % | HEART RATE: 65 BPM | WEIGHT: 166.38 LBS | TEMPERATURE: 98 F | SYSTOLIC BLOOD PRESSURE: 109 MMHG | RESPIRATION RATE: 18 BRPM | BODY MASS INDEX: 25.22 KG/M2 | DIASTOLIC BLOOD PRESSURE: 68 MMHG | HEIGHT: 68 IN

## 2023-06-12 DIAGNOSIS — Z00.00 WELL ADULT EXAM: Primary | ICD-10-CM

## 2023-06-12 PROCEDURE — 1101F PT FALLS ASSESS-DOCD LE1/YR: CPT | Mod: ,,, | Performed by: NURSE PRACTITIONER

## 2023-06-12 PROCEDURE — 3008F BODY MASS INDEX DOCD: CPT | Mod: ,,, | Performed by: NURSE PRACTITIONER

## 2023-06-12 PROCEDURE — 3288F PR FALLS RISK ASSESSMENT DOCUMENTED: ICD-10-PCS | Mod: ,,, | Performed by: NURSE PRACTITIONER

## 2023-06-12 PROCEDURE — 1159F MED LIST DOCD IN RCRD: CPT | Mod: ,,, | Performed by: NURSE PRACTITIONER

## 2023-06-12 PROCEDURE — 4010F ACE/ARB THERAPY RXD/TAKEN: CPT | Mod: ,,, | Performed by: NURSE PRACTITIONER

## 2023-06-12 PROCEDURE — 3074F SYST BP LT 130 MM HG: CPT | Mod: ,,, | Performed by: NURSE PRACTITIONER

## 2023-06-12 PROCEDURE — G0439 PR MEDICARE ANNUAL WELLNESS SUBSEQUENT VISIT: ICD-10-PCS | Mod: ,,, | Performed by: NURSE PRACTITIONER

## 2023-06-12 PROCEDURE — G0439 PPPS, SUBSEQ VISIT: HCPCS | Mod: ,,, | Performed by: NURSE PRACTITIONER

## 2023-06-12 PROCEDURE — 1159F PR MEDICATION LIST DOCUMENTED IN MEDICAL RECORD: ICD-10-PCS | Mod: ,,, | Performed by: NURSE PRACTITIONER

## 2023-06-12 PROCEDURE — 1160F PR REVIEW ALL MEDS BY PRESCRIBER/CLIN PHARMACIST DOCUMENTED: ICD-10-PCS | Mod: ,,, | Performed by: NURSE PRACTITIONER

## 2023-06-12 PROCEDURE — 4010F PR ACE/ARB THEARPY RXD/TAKEN: ICD-10-PCS | Mod: ,,, | Performed by: NURSE PRACTITIONER

## 2023-06-12 PROCEDURE — 3061F PR NEG MICROALBUMINURIA RESULT DOCUMENTED/REVIEW: ICD-10-PCS | Mod: ,,, | Performed by: NURSE PRACTITIONER

## 2023-06-12 PROCEDURE — 1125F PR PAIN SEVERITY QUANTIFIED, PAIN PRESENT: ICD-10-PCS | Mod: ,,, | Performed by: NURSE PRACTITIONER

## 2023-06-12 PROCEDURE — 3066F PR DOCUMENTATION OF TREATMENT FOR NEPHROPATHY: ICD-10-PCS | Mod: ,,, | Performed by: NURSE PRACTITIONER

## 2023-06-12 PROCEDURE — 3066F NEPHROPATHY DOC TX: CPT | Mod: ,,, | Performed by: NURSE PRACTITIONER

## 2023-06-12 PROCEDURE — 3074F PR MOST RECENT SYSTOLIC BLOOD PRESSURE < 130 MM HG: ICD-10-PCS | Mod: ,,, | Performed by: NURSE PRACTITIONER

## 2023-06-12 PROCEDURE — 1101F PR PT FALLS ASSESS DOC 0-1 FALLS W/OUT INJ PAST YR: ICD-10-PCS | Mod: ,,, | Performed by: NURSE PRACTITIONER

## 2023-06-12 PROCEDURE — 3288F FALL RISK ASSESSMENT DOCD: CPT | Mod: ,,, | Performed by: NURSE PRACTITIONER

## 2023-06-12 PROCEDURE — 1160F RVW MEDS BY RX/DR IN RCRD: CPT | Mod: ,,, | Performed by: NURSE PRACTITIONER

## 2023-06-12 PROCEDURE — 3061F NEG MICROALBUMINURIA REV: CPT | Mod: ,,, | Performed by: NURSE PRACTITIONER

## 2023-06-12 PROCEDURE — 1125F AMNT PAIN NOTED PAIN PRSNT: CPT | Mod: ,,, | Performed by: NURSE PRACTITIONER

## 2023-06-12 PROCEDURE — 3078F DIAST BP <80 MM HG: CPT | Mod: ,,, | Performed by: NURSE PRACTITIONER

## 2023-06-12 PROCEDURE — 3008F PR BODY MASS INDEX (BMI) DOCUMENTED: ICD-10-PCS | Mod: ,,, | Performed by: NURSE PRACTITIONER

## 2023-06-12 PROCEDURE — 3078F PR MOST RECENT DIASTOLIC BLOOD PRESSURE < 80 MM HG: ICD-10-PCS | Mod: ,,, | Performed by: NURSE PRACTITIONER

## 2023-06-12 NOTE — PROGRESS NOTES
Patient ID: [id number]     Chief Complaint: Annual Exam      HPI:     Rhoda Cesar is a 67 y.o. female here today for an annual wellness visit. No other complaints today.     Cervical Cancer Screening -Up to date  Breast Cancer Screening - Up to date  Colon Cancer Screening - Up to date   Eye Exam - Up to date  Dental Exam - Recommend biannually  Vaccinations -   Immunization History   Administered Date(s) Administered    COVID-19, MRNA, LN-S, PF (Pfizer) (Gray Cap) 01/31/2022    COVID-19, MRNA, LN-S, PF (Pfizer) (Purple Cap) 03/09/2021, 03/30/2021    Influenza 10/10/2014    Influenza - Quadrivalent 10/11/2016    Influenza - Quadrivalent - High Dose - PF (65 years and older) 10/21/2020, 10/25/2021    Influenza - Quadrivalent - PF (6-35 months) 10/11/2017, 10/22/2018    Influenza - Quadrivalent - PF *Preferred* (6 months and older) 10/05/2009, 01/27/2014    Influenza - Trivalent - PF (ADULT) 10/05/2009, 01/27/2014, 10/03/2014, 10/19/2015, 10/11/2017, 10/15/2019, 10/15/2019    Pneumococcal 10/19/2015    Pneumococcal Conjugate - 13 Valent 10/22/2018    Pneumococcal Polysaccharide - 23 Valent 10/19/2015, 01/15/2020    Td (ADULT) 10/11/2016    Td - PF (ADULT) 10/11/2016    Zoster Recombinant 07/12/2021, 09/13/2021        Past Medical History:  has a past medical history of Arthritis, Diabetes mellitus, Digestive disorder, Hypertension, and Thyroid disease.    Surgical History:  has a past surgical history that includes Cholecystectomy; Knee arthroscopy; Tubal ligation; Appendectomy; Colonoscopy; and robotic arthroplasty, knee (Right, 1/3/2023).    Family History: family history includes Arrhythmia in her brother; Arthritis in her daughter and mother; Brain cancer in her brother; Cancer in her mother; Cervical cancer in her sister; Deep vein thrombosis in her sister; Diabetes in her mother and sister; Heart attack in her father; Heart failure in her brother and mother; Peripheral vascular disease in her  father; Stroke in her brother, father, mother, and sister; Ulcerative colitis in her sister; Ulcers in her father.    Social History:  reports that she has never smoked. She has never used smokeless tobacco. She reports that she does not currently use alcohol. She reports that she does not currently use drugs.    Current Outpatient Medications   Medication Instructions    acetaminophen (TYLENOL) 650 mg, Oral, 2 times daily    ALLERGY RELIEF, CETIRIZINE, 10 mg tablet TAKE ONE TABLET BY MOUTH EVERY DAY    ascorbic acid (vitamin C) (VITAMIN C) 500 mg, Oral, Daily    aspirin (ECOTRIN) 81 mg, Oral, Every 12 hours    blood-glucose meter (ONETOUCH ULTRA2 METER) Misc USE TO CHECK BLOOD GLUCOSE LEVELS ONCE DAILY    cephALEXin (KEFLEX) 500 mg, Oral, Every 12 hours    conjugated estrogens (PREMARIN) 0.5 g, Vaginal, Twice weekly, Apply small amount to gloved finger and apply to vaginal walls.    cyclobenzaprine (FLEXERIL) 5 MG tablet TAKE ONE TABLET BY MOUTH EVERY NIGHT AS NEEDED FOR MUSCLE SPASMS    ferrous sulfate (FEOSOL) Tab tablet 1 tablet, Oral, With breakfast    gabapentin (NEURONTIN) 600 mg, Oral, 2 times daily    JARDIANCE 10 mg, Oral, Nightly    lisinopriL 10 MG tablet TAKE ONE TABLET BY MOUTH EVERY DAY    meloxicam (MOBIC) 7.5 mg, Oral, Daily    multivitamin (THERAGRAN) per tablet 1 tablet, Oral, Daily    ONETOUCH DELICA PLUS LANCET 33 gauge Misc USE DAILY AND AS NEEDED FOR BLOOD GLUCOSE TESTING    ONETOUCH ULTRA TEST Strp USE TO CHECK BLOOD GLUCOSE LEVELS DAILY AS DIRECTED    pantoprazole (PROTONIX) 40 MG tablet TAKE ONE TABLET BY MOUTH EVERY DAY    rosuvastatin (CRESTOR) 10 mg, Oral, Daily    SYNTHROID 150 mcg tablet TAKE ONE TABLET BY MOUTH EVERY DAY    vitamin E 200 Units, Oral, Daily    zinc gluconate 50 mg, Oral, Daily       Patient is allergic to sulfa (sulfonamide antibiotics) and codeine.     Patient Care Team:  BRUNO Darden as PCP - General (Nurse Practitioner)  Cece  "TORY Rojas as Care Coordinator  Aneudy Hurst #[id number]       Subjective:     Review of Systems    12 point review of systems conducted, negative except as stated in the history of present illness. See HPI for details.      Objective:     Visit Vitals  /68 (BP Location: Left arm, Patient Position: Sitting)   Pulse 65   Temp 98.1 °F (36.7 °C) (Oral)   Resp 18   Ht 5' 8.4" (1.737 m)   Wt 75.5 kg (166 lb 6.4 oz)   SpO2 97%   BMI 25.01 kg/m²       Physical Exam    General: Alert and oriented, No acute distress.  Head: Normocephalic, Atraumatic.  Eye: Pupils are equal, round and reactive to light, Extraocular movements are intact, Sclera non-icteric.  Ears/Nose/Throat: Normal, Mucosa moist,Clear.  Neck/Thyroid: Supple, Non-tender, No carotid bruit, No lymphadenopathy, No JVD, Full range of motion.  Respiratory: Clear to auscultation bilaterally; No wheezes, rales or rhonchi,Non-labored respirations, Symmetrical chest wall expansion.  Cardiovascular: Regular rate and rhythm, S1/S2 normal, No murmurs, rubs or gallops.  Gastrointestinal: Soft, Non-tender, Non-distended, Normal bowel sounds, No palpable organomegaly.  Musculoskeletal: Normal range of motion.  Integumentary: Warm, Dry, Intact, No suspicious lesions or rashes.  Extremities: No clubbing, cyanosis or edema  Neurologic: No focal deficits, Cranial Nerves II-XII are grossly intact, Motor strength normal upper and lower extremities, Sensory exam intact.  Psychiatric: Normal interaction, Coherent speech, Euthymic mood, Appropriate affect     Labs Reviewed:     Chemistry:  Lab Results   Component Value Date     06/09/2023    K 5.0 06/09/2023    CHLORIDE 104 06/09/2023    BUN 15.1 06/09/2023    CREATININE 0.74 06/09/2023    EGFRNORACEVR >60 06/09/2023    GLUCOSE 142 (H) 06/09/2023    CALCIUM 9.7 06/09/2023    ALKPHOS 92 06/09/2023    LABPROT 7.5 06/09/2023    ALBUMIN 4.4 06/09/2023    BILIDIR 0.4 06/14/2021    IBILI 0.60 06/14/2021    AST 17 06/09/2023    " ALT 19 06/09/2023    MG 2.0 09/01/2019    [id number] 52.6 06/09/2023    TSH 2.251 06/09/2023    MSNNEA5ZSKZ 1.30 01/04/2020        Lab Results   Component Value Date    HGBA1C 6.8 06/09/2023        Hematology:  Lab Results   Component Value Date    WBC 8.25 06/09/2023    HGB 15.0 06/09/2023    HCT 49.1 (H) 06/09/2023     06/09/2023       Lipid Panel:  Lab Results   Component Value Date    CHOL 161 06/09/2023    HDL 50 06/09/2023    LDL 86.00 06/09/2023    TRIG 127 06/09/2023    TOTALCHOLEST 3 06/09/2023        Urine:  Lab Results   Component Value Date    COLORUA Yellow 12/12/2022    APPEARANCEUA Hazy (A) 12/12/2022    SGUA 1.020 12/12/2022    PHUA 6.0 12/12/2022    PROTEINUA Negative 12/12/2022    GLUCOSEUA >=1000 (A) 12/12/2022    KETONESUA Negative 12/12/2022    BLOODUA Negative 12/12/2022    NITRITESUA Negative 12/12/2022    LEUKOCYTESUR Small (A) 12/12/2022    RBCUA None Seen 12/12/2022    WBCUA 21-50 (A) 12/12/2022    BACTERIA Few (A) 12/12/2022    SQEPUA  (A) 09/24/2019    HYALINECASTS 0 09/24/2019    CREATRANDUR 47.8 06/09/2023          Assessment:       ICD-10-CM ICD-9-CM   1. Well adult exam  Z00.00 V70.0        Plan:     1. Well adult exam  Healthy lifestyle discussed.  ADA diet advised.  Increase physical activity to at least 30 minutes a day on most days a week as tolerated.        I offered to discuss advanced care planning, including how to pick a person who would make decisions for you if you were unable to make them for yourself, called a health care power of , and what kind of decisions you might make such as use of life sustaining treatments such as ventilators and tube feeding when faced with a life limiting illness.      Follow up in about 6 months (around 12/12/2023) for DM-A1C Due. In addition to their scheduled follow up, the patient has also been instructed to follow up on as needed basis.     Future Appointments   Date Time Provider Department Center   7/19/2023  8:00  AM Dev Weaver MD [facility] ORTHO [facility]   9/27/2023  9:10 AM Rach Pantoja, CATHLEEN [facility]        MAXIMO DardenP

## 2023-07-09 DIAGNOSIS — E03.9 HYPOTHYROIDISM, UNSPECIFIED TYPE: ICD-10-CM

## 2023-07-09 DIAGNOSIS — M19.90 ARTHRITIS: Primary | ICD-10-CM

## 2023-07-10 RX ORDER — GABAPENTIN 300 MG/1
CAPSULE ORAL
Qty: 120 CAPSULE | Refills: 11 | Status: SHIPPED | OUTPATIENT
Start: 2023-07-10

## 2023-07-10 RX ORDER — LEVOTHYROXINE SODIUM 150 MCG
150 TABLET ORAL DAILY
Qty: 30 TABLET | Refills: 11 | Status: SHIPPED | OUTPATIENT
Start: 2023-07-10

## 2023-07-10 NOTE — TELEPHONE ENCOUNTER
----- Message from Ivonne Holt sent at 7/10/2023  8:05 AM CDT -----  Regarding: refill  Type:  RX Refill Request    Who Called: Iris  Refill or New Rx:refill  RX Name and Strength:  gabapentin (NEURONTIN) 300 MG capsule   How is the patient currently norma it? (ex. 1XDay):2x a day  Is this a 30 day or 90 day RX:  Preferred Pharmacy with phone number:[facility]  Local or Mail Order:local   Ordering Provider:  Would the patient rather a call back or a response via [employer]?   Best Call Back Number:  Additional Information: Patient stated she is completely out of medication    Type:  RX Refill Request    Who Called: Iris  Refill or New Rx:refill   RX Name and Strength:SYNTHROID 150 mcg tablet  How is the patient currently taking it? (ex. 1XDay):  Is this a 30 day or 90 day RX:  Preferred Pharmacy with phone number:[facility]  Local or Mail Order:local  Ordering Provider:  Would the patient rather a call back or a response via [employer]?  Best Call Back Number:  Additional Information:

## 2023-07-19 ENCOUNTER — OFFICE VISIT (OUTPATIENT)
Dept: ORTHOPEDICS | Facility: CLINIC | Age: 68
End: 2023-07-19
Payer: MEDICARE

## 2023-07-19 VITALS — BODY MASS INDEX: 25.23 KG/M2 | WEIGHT: 166.44 LBS | HEIGHT: 68 IN

## 2023-07-19 DIAGNOSIS — Z96.651 HISTORY OF TOTAL KNEE REPLACEMENT, RIGHT: Primary | ICD-10-CM

## 2023-07-19 PROCEDURE — 99213 PR OFFICE/OUTPT VISIT, EST, LEVL III, 20-29 MIN: ICD-10-PCS | Mod: ,,, | Performed by: ORTHOPAEDIC SURGERY

## 2023-07-19 PROCEDURE — 1125F PR PAIN SEVERITY QUANTIFIED, PAIN PRESENT: ICD-10-PCS | Mod: CPTII,,, | Performed by: ORTHOPAEDIC SURGERY

## 2023-07-19 PROCEDURE — 4010F ACE/ARB THERAPY RXD/TAKEN: CPT | Mod: CPTII,,, | Performed by: ORTHOPAEDIC SURGERY

## 2023-07-19 PROCEDURE — 3008F BODY MASS INDEX DOCD: CPT | Mod: CPTII,,, | Performed by: ORTHOPAEDIC SURGERY

## 2023-07-19 PROCEDURE — 1101F PR PT FALLS ASSESS DOC 0-1 FALLS W/OUT INJ PAST YR: ICD-10-PCS | Mod: CPTII,,, | Performed by: ORTHOPAEDIC SURGERY

## 2023-07-19 PROCEDURE — 3288F FALL RISK ASSESSMENT DOCD: CPT | Mod: CPTII,,, | Performed by: ORTHOPAEDIC SURGERY

## 2023-07-19 PROCEDURE — 3061F PR NEG MICROALBUMINURIA RESULT DOCUMENTED/REVIEW: ICD-10-PCS | Mod: CPTII,,, | Performed by: ORTHOPAEDIC SURGERY

## 2023-07-19 PROCEDURE — 1160F RVW MEDS BY RX/DR IN RCRD: CPT | Mod: CPTII,,, | Performed by: ORTHOPAEDIC SURGERY

## 2023-07-19 PROCEDURE — 3066F PR DOCUMENTATION OF TREATMENT FOR NEPHROPATHY: ICD-10-PCS | Mod: CPTII,,, | Performed by: ORTHOPAEDIC SURGERY

## 2023-07-19 PROCEDURE — 1125F AMNT PAIN NOTED PAIN PRSNT: CPT | Mod: CPTII,,, | Performed by: ORTHOPAEDIC SURGERY

## 2023-07-19 PROCEDURE — 3044F PR MOST RECENT HEMOGLOBIN A1C LEVEL <7.0%: ICD-10-PCS | Mod: CPTII,,, | Performed by: ORTHOPAEDIC SURGERY

## 2023-07-19 PROCEDURE — 1159F MED LIST DOCD IN RCRD: CPT | Mod: CPTII,,, | Performed by: ORTHOPAEDIC SURGERY

## 2023-07-19 PROCEDURE — 1160F PR REVIEW ALL MEDS BY PRESCRIBER/CLIN PHARMACIST DOCUMENTED: ICD-10-PCS | Mod: CPTII,,, | Performed by: ORTHOPAEDIC SURGERY

## 2023-07-19 PROCEDURE — 1101F PT FALLS ASSESS-DOCD LE1/YR: CPT | Mod: CPTII,,, | Performed by: ORTHOPAEDIC SURGERY

## 2023-07-19 PROCEDURE — 4010F PR ACE/ARB THEARPY RXD/TAKEN: ICD-10-PCS | Mod: CPTII,,, | Performed by: ORTHOPAEDIC SURGERY

## 2023-07-19 PROCEDURE — 99213 OFFICE O/P EST LOW 20 MIN: CPT | Mod: ,,, | Performed by: ORTHOPAEDIC SURGERY

## 2023-07-19 PROCEDURE — 3288F PR FALLS RISK ASSESSMENT DOCUMENTED: ICD-10-PCS | Mod: CPTII,,, | Performed by: ORTHOPAEDIC SURGERY

## 2023-07-19 PROCEDURE — 3044F HG A1C LEVEL LT 7.0%: CPT | Mod: CPTII,,, | Performed by: ORTHOPAEDIC SURGERY

## 2023-07-19 PROCEDURE — 1159F PR MEDICATION LIST DOCUMENTED IN MEDICAL RECORD: ICD-10-PCS | Mod: CPTII,,, | Performed by: ORTHOPAEDIC SURGERY

## 2023-07-19 PROCEDURE — 3061F NEG MICROALBUMINURIA REV: CPT | Mod: CPTII,,, | Performed by: ORTHOPAEDIC SURGERY

## 2023-07-19 PROCEDURE — 3066F NEPHROPATHY DOC TX: CPT | Mod: CPTII,,, | Performed by: ORTHOPAEDIC SURGERY

## 2023-07-19 PROCEDURE — 3008F PR BODY MASS INDEX (BMI) DOCUMENTED: ICD-10-PCS | Mod: CPTII,,, | Performed by: ORTHOPAEDIC SURGERY

## 2023-07-19 NOTE — PROGRESS NOTES
"Subjective:    CC: Pain of the Right Knee and Follow-up (F/u R TKA 1/3/23, pt states she is doing well but c/o she gets pain every now and then, stiffness/numbness when walking up stairs or standing for a period of time, having some numbness lateral of her knee right now, otc for pain prn, states no other changes from last visit)       HPI:  Patient returns today for repeat exam.  Patient is 6 months from her right total knee arthroplasty.  Patient states she is doing well, the lateral part continues to improve, she is doing much better stairs.    ROS: Refer to HPI for pertinent ROS. All other 12 point systems negative.    Objective:  Vitals:    07/19/23 0805   Weight: 75.5 kg (166 lb 7.2 oz)   Height: 5' 8.4" (1.737 m)        Physical Exam:  Right lower extremity compartments are soft warm.  Skin is intact.  There is no signs or symptoms of DVT or infection.  Her incision is well healed there is no swelling there is no erythema her motion is 0-120 degrees her patella is tracking appropriately she is stable to stressing, she is neurovascular intact distally.    Images: . Images Reviewed and discussed with patient.    Assessment:  1. History of total knee replacement, right        Plan:  At this time we discussed her physical exam and intraoperative findings.  She is doing very well, she will continue low-impact activities.  I would like see back for her 1 year appointment.    Follow UP: No follow-ups on file.              "

## 2023-07-24 ENCOUNTER — TELEPHONE (OUTPATIENT)
Dept: FAMILY MEDICINE | Facility: CLINIC | Age: 68
End: 2023-07-24
Payer: MEDICARE

## 2023-07-24 NOTE — TELEPHONE ENCOUNTER
4 sample boxes of Jardiance 10mg given to pt.     ----- Message from Ivonne Holt sent at 7/24/2023  8:15 AM CDT -----  Type:  Needs Medical Advice    Who Called: Rhoda  Would the patient rather a call back or a response via [employer]? Call back  Best Call Back Number: [phone]  Additional Information: Rhoda called requesting jardiance. She stated she was advised to contact office.

## 2023-08-25 DIAGNOSIS — Z12.31 ENCOUNTER FOR SCREENING MAMMOGRAM FOR BREAST CANCER: ICD-10-CM

## 2023-08-25 DIAGNOSIS — M17.0 PRIMARY OSTEOARTHRITIS OF BOTH KNEES: Primary | ICD-10-CM

## 2023-08-25 RX ORDER — CYCLOBENZAPRINE HCL 5 MG
TABLET ORAL
Qty: 30 TABLET | Refills: 11 | Status: SHIPPED | OUTPATIENT
Start: 2023-08-25 | End: 2023-12-11

## 2023-08-29 DIAGNOSIS — K21.9 GASTROESOPHAGEAL REFLUX DISEASE, UNSPECIFIED WHETHER ESOPHAGITIS PRESENT: Primary | ICD-10-CM

## 2023-08-29 RX ORDER — PANTOPRAZOLE SODIUM 40 MG/1
40 TABLET, DELAYED RELEASE ORAL DAILY
Qty: 30 TABLET | Refills: 11 | Status: SHIPPED | OUTPATIENT
Start: 2023-08-29

## 2023-09-21 ENCOUNTER — TELEPHONE (OUTPATIENT)
Dept: FAMILY MEDICINE | Facility: CLINIC | Age: 68
End: 2023-09-21
Payer: MEDICARE

## 2023-10-18 ENCOUNTER — TELEPHONE (OUTPATIENT)
Dept: FAMILY MEDICINE | Facility: CLINIC | Age: 68
End: 2023-10-18
Payer: MEDICARE

## 2023-10-19 DIAGNOSIS — I10 HYPERTENSION, UNSPECIFIED TYPE: Primary | ICD-10-CM

## 2023-10-19 RX ORDER — LISINOPRIL 10 MG/1
TABLET ORAL
Qty: 90 TABLET | Refills: 3 | Status: SHIPPED | OUTPATIENT
Start: 2023-10-19 | End: 2024-01-17 | Stop reason: SDUPTHER

## 2023-11-27 DIAGNOSIS — E11.9 TYPE 2 DIABETES MELLITUS WITHOUT COMPLICATION, UNSPECIFIED WHETHER LONG TERM INSULIN USE: Primary | ICD-10-CM

## 2023-12-07 ENCOUNTER — LAB VISIT (OUTPATIENT)
Dept: LAB | Facility: HOSPITAL | Age: 68
End: 2023-12-07
Attending: NURSE PRACTITIONER
Payer: MEDICARE

## 2023-12-07 DIAGNOSIS — E11.9 TYPE 2 DIABETES MELLITUS WITHOUT COMPLICATION, UNSPECIFIED WHETHER LONG TERM INSULIN USE: ICD-10-CM

## 2023-12-07 LAB
EST. AVERAGE GLUCOSE BLD GHB EST-MCNC: 165.7 MG/DL
HBA1C MFR BLD: 7.4 %

## 2023-12-07 PROCEDURE — 83036 HEMOGLOBIN GLYCOSYLATED A1C: CPT

## 2023-12-07 PROCEDURE — 36415 COLL VENOUS BLD VENIPUNCTURE: CPT

## 2023-12-11 ENCOUNTER — OFFICE VISIT (OUTPATIENT)
Dept: FAMILY MEDICINE | Facility: CLINIC | Age: 68
End: 2023-12-11
Payer: MEDICARE

## 2023-12-11 VITALS
DIASTOLIC BLOOD PRESSURE: 76 MMHG | HEART RATE: 66 BPM | OXYGEN SATURATION: 98 % | WEIGHT: 177.38 LBS | TEMPERATURE: 98 F | BODY MASS INDEX: 26.66 KG/M2 | SYSTOLIC BLOOD PRESSURE: 133 MMHG

## 2023-12-11 DIAGNOSIS — M54.41 ACUTE RIGHT-SIDED LOW BACK PAIN WITH RIGHT-SIDED SCIATICA: ICD-10-CM

## 2023-12-11 DIAGNOSIS — I10 PRIMARY HYPERTENSION: Primary | ICD-10-CM

## 2023-12-11 DIAGNOSIS — E11.9 TYPE 2 DIABETES MELLITUS WITHOUT COMPLICATION, UNSPECIFIED WHETHER LONG TERM INSULIN USE: ICD-10-CM

## 2023-12-11 PROCEDURE — 3078F PR MOST RECENT DIASTOLIC BLOOD PRESSURE < 80 MM HG: ICD-10-PCS | Mod: ,,, | Performed by: NURSE PRACTITIONER

## 2023-12-11 PROCEDURE — 4010F PR ACE/ARB THEARPY RXD/TAKEN: ICD-10-PCS | Mod: ,,, | Performed by: NURSE PRACTITIONER

## 2023-12-11 PROCEDURE — 3066F PR DOCUMENTATION OF TREATMENT FOR NEPHROPATHY: ICD-10-PCS | Mod: ,,, | Performed by: NURSE PRACTITIONER

## 2023-12-11 PROCEDURE — 3075F PR MOST RECENT SYSTOLIC BLOOD PRESS GE 130-139MM HG: ICD-10-PCS | Mod: ,,, | Performed by: NURSE PRACTITIONER

## 2023-12-11 PROCEDURE — 3008F PR BODY MASS INDEX (BMI) DOCUMENTED: ICD-10-PCS | Mod: ,,, | Performed by: NURSE PRACTITIONER

## 2023-12-11 PROCEDURE — 1101F PT FALLS ASSESS-DOCD LE1/YR: CPT | Mod: ,,, | Performed by: NURSE PRACTITIONER

## 2023-12-11 PROCEDURE — 3051F HG A1C>EQUAL 7.0%<8.0%: CPT | Mod: ,,, | Performed by: NURSE PRACTITIONER

## 2023-12-11 PROCEDURE — 3288F FALL RISK ASSESSMENT DOCD: CPT | Mod: ,,, | Performed by: NURSE PRACTITIONER

## 2023-12-11 PROCEDURE — 4010F ACE/ARB THERAPY RXD/TAKEN: CPT | Mod: ,,, | Performed by: NURSE PRACTITIONER

## 2023-12-11 PROCEDURE — 3066F NEPHROPATHY DOC TX: CPT | Mod: ,,, | Performed by: NURSE PRACTITIONER

## 2023-12-11 PROCEDURE — 1160F RVW MEDS BY RX/DR IN RCRD: CPT | Mod: ,,, | Performed by: NURSE PRACTITIONER

## 2023-12-11 PROCEDURE — 3051F PR MOST RECENT HEMOGLOBIN A1C LEVEL 7.0 - < 8.0%: ICD-10-PCS | Mod: ,,, | Performed by: NURSE PRACTITIONER

## 2023-12-11 PROCEDURE — 1160F PR REVIEW ALL MEDS BY PRESCRIBER/CLIN PHARMACIST DOCUMENTED: ICD-10-PCS | Mod: ,,, | Performed by: NURSE PRACTITIONER

## 2023-12-11 PROCEDURE — 3061F PR NEG MICROALBUMINURIA RESULT DOCUMENTED/REVIEW: ICD-10-PCS | Mod: ,,, | Performed by: NURSE PRACTITIONER

## 2023-12-11 PROCEDURE — 99214 OFFICE O/P EST MOD 30 MIN: CPT | Mod: ,,, | Performed by: NURSE PRACTITIONER

## 2023-12-11 PROCEDURE — 1159F PR MEDICATION LIST DOCUMENTED IN MEDICAL RECORD: ICD-10-PCS | Mod: ,,, | Performed by: NURSE PRACTITIONER

## 2023-12-11 PROCEDURE — 3288F PR FALLS RISK ASSESSMENT DOCUMENTED: ICD-10-PCS | Mod: ,,, | Performed by: NURSE PRACTITIONER

## 2023-12-11 PROCEDURE — 3078F DIAST BP <80 MM HG: CPT | Mod: ,,, | Performed by: NURSE PRACTITIONER

## 2023-12-11 PROCEDURE — 3008F BODY MASS INDEX DOCD: CPT | Mod: ,,, | Performed by: NURSE PRACTITIONER

## 2023-12-11 PROCEDURE — 1101F PR PT FALLS ASSESS DOC 0-1 FALLS W/OUT INJ PAST YR: ICD-10-PCS | Mod: ,,, | Performed by: NURSE PRACTITIONER

## 2023-12-11 PROCEDURE — 1159F MED LIST DOCD IN RCRD: CPT | Mod: ,,, | Performed by: NURSE PRACTITIONER

## 2023-12-11 PROCEDURE — 3075F SYST BP GE 130 - 139MM HG: CPT | Mod: ,,, | Performed by: NURSE PRACTITIONER

## 2023-12-11 PROCEDURE — 3061F NEG MICROALBUMINURIA REV: CPT | Mod: ,,, | Performed by: NURSE PRACTITIONER

## 2023-12-11 PROCEDURE — 99214 PR OFFICE/OUTPT VISIT, EST, LEVL IV, 30-39 MIN: ICD-10-PCS | Mod: ,,, | Performed by: NURSE PRACTITIONER

## 2023-12-11 RX ORDER — IBUPROFEN 800 MG/1
800 TABLET ORAL EVERY 8 HOURS PRN
Qty: 30 TABLET | Refills: 1 | Status: SHIPPED | OUTPATIENT
Start: 2023-12-11 | End: 2024-01-11 | Stop reason: SDUPTHER

## 2023-12-11 RX ORDER — CHLORHEXIDINE GLUCONATE ORAL RINSE 1.2 MG/ML
SOLUTION DENTAL
COMMUNITY
Start: 2023-10-24

## 2023-12-11 RX ORDER — ROSUVASTATIN CALCIUM 10 MG/1
10 TABLET, COATED ORAL DAILY
Qty: 30 TABLET | Refills: 11 | Status: SHIPPED | OUTPATIENT
Start: 2023-12-11 | End: 2024-12-10

## 2023-12-11 NOTE — LETTER
AUTHORIZATION FOR RELEASE OF   CONFIDENTIAL INFORMATION    Dear Zhane payne on [address],    We are seeing Rhoda Cesar, date of birth 1955, in the clinic at [facility]. Radha Ponce FNP is the patient's PCP. Rhoda Cesar has an outstanding lab/procedure at the time we reviewed her chart. In order to help keep her health information updated, she has authorized us to request the following medical record(s):        (  )  MAMMOGRAM                                      (  )  COLONOSCOPY      (  )  PAP SMEAR                                          (  )  OUTSIDE LAB RESULTS     (  )  DEXA SCAN                                          (  )  EYE EXAM            (  )  FOOT EXAM                                          (  )  ENTIRE RECORD     (  )  OUTSIDE IMMUNIZATIONS                 (  x)  diabetic eye exam          Please fax records to [facility], [place], Christi D, FNP, [phone]     If you have any questions, please contact Melissa Rubin at [phone].           Patient Name: Rhoda Cesar  : 1955  Patient Phone #: [phone]

## 2023-12-11 NOTE — PATIENT INSTRUCTIONS
Casey Duong,     If you are due for any health screening(s) below please notify me so we can arrange them to be ordered and scheduled. Most healthy patients at your age complete them, but you are free to accept or refuse.     If you can't do it, I'll definitely understand. If you can, I'd certainly appreciate it!    Tests to Keep You Healthy    Mammogram: DUE  Eye Exam: DUE  Colon Cancer Screening: Met on 5/4/2017  Last Blood Pressure <= 139/89 (12/11/2023): Yes  Last HbA1c < 8 (12/07/2023): Yes      Schedule your breast cancer screening today     Breast cancer is the second most common cancer in women,  and the second leading cause of death from cancer. Mammograms can detect breast cancer early, which significantly increases the chances of curing the cancer.       Our records indicate that you may be overdue for breast cancer screening. Cancer screenings save lives, so schedule yours today to stay healthy.     If you recently had a mammogram performed outside of [facility], please let your Health care team know so that they can update your health record.        Your diabetic retinal eye exam is due     Diabetes is the #1 cause of blindness in the [place] - early detection before signs or symptoms develop can prevent debilitating blindness.     Our records indicate that you may be overdue for your annual diabetic eye exam. Eye screening can help identify patients at risk for developing vision loss which is common in diabetes. This simple screening is an important step to keeping you healthy and preventing complications from diabetes.     This recommended diabetic eye exam should take place once per year and can prevent and treat diabetes complications in the eye before developing symptoms. This can be done with a special camera is used to take photographs of the back of your eye without having to dilate them, or you can see an eye doctor for a full dilated exam.     If you recently had your yearly diabetic eye  exam performed outside of [facility], please let your Health care team know so that they can update your health record.

## 2023-12-11 NOTE — PROGRESS NOTES
Patient ID: [id number]     Chief Complaint: Diabetes (6 mo fu)      HPI:     Rhoda Cesar is a 68 y.o. female here today for a follow up.  Patient presents with complaints right-sided back pain radiating down to right lower extremity times 2 days.  Patient denies any trauma or injury.  Patient denies any saddle anesthesia or bowel or bladder incontinence.  Patient denies relief with meloxicam and Tylenol.  Patient rates pain 5/10 in office.  Pain is described as an aching sensation.    Past Medical History:  has a past medical history of Arthritis, Diabetes mellitus, Digestive disorder, Hypertension, and Thyroid disease.    Surgical History:  has a past surgical history that includes Cholecystectomy; Knee arthroscopy; Tubal ligation; Appendectomy; Colonoscopy; and robotic arthroplasty, knee (Right, 1/3/2023).    Family History: family history includes Arrhythmia in her brother; Arthritis in her daughter and mother; Brain cancer in her brother; Cancer in her mother; Cervical cancer in her sister; Deep vein thrombosis in her sister; Diabetes in her mother and sister; Heart attack in her father; Heart failure in her brother and mother; Peripheral vascular disease in her father; Stroke in her brother, father, mother, and sister; Ulcerative colitis in her sister; Ulcers in her father.    Social History:  reports that she has never smoked. She has never used smokeless tobacco. She reports that she does not currently use alcohol. She reports that she does not currently use drugs.    Current Outpatient Medications   Medication Instructions    acetaminophen (TYLENOL) 650 mg, Oral, 2 times daily    ALLERGY RELIEF, CETIRIZINE, 10 mg tablet TAKE ONE TABLET BY MOUTH EVERY DAY    ascorbic acid (vitamin C) (VITAMIN C) 500 mg, Oral, Daily    aspirin (ECOTRIN) 81 mg, Oral, Every 12 hours    blood-glucose meter (ONETOUCH ULTRA2 METER) Misc USE TO CHECK BLOOD GLUCOSE LEVELS ONCE DAILY    chlorhexidine (PERIDEX) 0.12 % solution      conjugated estrogens (PREMARIN) 0.5 g, Vaginal, Twice weekly, Apply small amount to gloved finger and apply to vaginal walls.    ferrous sulfate (FEOSOL) Tab tablet 1 tablet, Oral, With breakfast    gabapentin (NEURONTIN) 300 MG capsule TAKE TWO CAPSULES BY MOUTH TWICE A DAY    ibuprofen (ADVIL,MOTRIN) 800 mg, Oral, Every 8 hours PRN    JARDIANCE 10 mg, Oral, Nightly    lisinopriL 10 MG tablet TAKE ONE TABLET BY MOUTH EVERY DAY    meloxicam (MOBIC) 7.5 mg, Oral, Daily    multivitamin (THERAGRAN) per tablet 1 tablet, Oral, Daily    ONETOUCH DELICA PLUS LANCET 33 gauge Misc USE DAILY AND AS NEEDED FOR BLOOD GLUCOSE TESTING    ONETOUCH ULTRA TEST Strp USE TO CHECK BLOOD GLUCOSE LEVELS DAILY AS DIRECTED    pantoprazole (PROTONIX) 40 mg, Oral, Daily    rosuvastatin (CRESTOR) 10 mg, Oral, Daily    SYNTHROID 150 mcg, Oral, Daily    vitamin E 200 Units, Oral, Daily    zinc gluconate 50 mg, Oral, Daily       Patient is allergic to sulfa (sulfonamide antibiotics) and codeine.     Patient Care Team:  Radha Ponce FNP as PCP - General (Nurse Practitioner)  Cece Rojas LPN as Care Coordinator  #[id number], [employer]       Subjective:     Review of Systems    12 point review of systems conducted, negative except as stated in the history of present illness. See HPI for details.      Objective:     Visit Vitals  /76   Pulse 66   Temp 98.1 °F (36.7 °C)   Wt 80.5 kg (177 lb 6.4 oz)   SpO2 98%   BMI 26.66 kg/m²       Physical Exam    General: Alert and oriented, No acute distress.  Head: Normocephalic, Atraumatic.  Eye: Pupils are equal, round and reactive to light, Extraocular movements are intact, Sclera non-icteric.  Ears/Nose/Throat: Normal, Mucosa moist,Clear.  Neck/Thyroid: Supple, Non-tender, No carotid bruit, No lymphadenopathy, No JVD, Full range of motion.  Respiratory: Clear to auscultation bilaterally; No wheezes, rales or rhonchi,Non-labored respirations, Symmetrical chest wall  expansion.  Cardiovascular: Regular rate and rhythm, S1/S2 normal, No murmurs, rubs or gallops.  Gastrointestinal: Soft, Non-tender, Non-distended, Normal bowel sounds, No palpable organomegaly.  Musculoskeletal:  Right sided lumbar tenderness to palpation.   Integumentary: Warm, Dry, Intact, No suspicious lesions or rashes.  Extremities: No clubbing, cyanosis or edema  Neurologic: No focal deficits, Cranial Nerves II-XII are grossly intact, Motor strength normal upper and lower extremities, Sensory exam intact.  Psychiatric: Normal interaction, Coherent speech, Euthymic mood, Appropriate affect     Labs Reviewed:     Chemistry:  Lab Results   Component Value Date     06/09/2023    K 5.0 06/09/2023    CHLORIDE 104 06/09/2023    BUN 15.1 06/09/2023    CREATININE 0.74 06/09/2023    EGFRNORACEVR >60 06/09/2023    GLUCOSE 142 (H) 06/09/2023    CALCIUM 9.7 06/09/2023    ALKPHOS 92 06/09/2023    LABPROT 7.5 06/09/2023    ALBUMIN 4.4 06/09/2023    BILIDIR 0.4 06/14/2021    IBILI 0.60 06/14/2021    AST 17 06/09/2023    ALT 19 06/09/2023    MG 2.0 09/01/2019    [id number] 52.6 06/09/2023    TSH 2.251 06/09/2023    ZOUSLG4GCEL 1.30 01/04/2020        Lab Results   Component Value Date    HGBA1C 7.4 (H) 12/07/2023        Hematology:  Lab Results   Component Value Date    WBC 8.25 06/09/2023    HGB 15.0 06/09/2023    HCT 49.1 (H) 06/09/2023     06/09/2023       Lipid Panel:  Lab Results   Component Value Date    CHOL 161 06/09/2023    HDL 50 06/09/2023    LDL 86.00 06/09/2023    TRIG 127 06/09/2023    TOTALCHOLEST 3 06/09/2023        Urine:  Lab Results   Component Value Date    COLORUA Yellow 12/12/2022    APPEARANCEUA Hazy (A) 12/12/2022    SGUA 1.020 12/12/2022    PHUA 6.0 12/12/2022    PROTEINUA Negative 12/12/2022    GLUCOSEUA >=1000 (A) 12/12/2022    KETONESUA Negative 12/12/2022    BLOODUA Negative 12/12/2022    NITRITESUA Negative 12/12/2022    LEUKOCYTESUR Small (A) 12/12/2022    RBCUA None Seen  12/12/2022    WBCUA 21-50 (A) 12/12/2022    BACTERIA Few (A) 12/12/2022    SQEPUA  (A) 09/24/2019    HYALINECASTS 0 09/24/2019    CREATRANDUR 47.8 06/09/2023          Assessment:       ICD-10-CM ICD-9-CM   1. Primary hypertension  I10 401.9   2. Type 2 diabetes mellitus without complication, unspecified whether long term insulin use  E11.9 250.00   3. Acute right-sided low back pain with right-sided sciatica  M54.41 724.2     724.3        Plan:     1. Primary hypertension  Well controlled.   Continue   Hypertension Medications               lisinopriL 10 MG tablet TAKE ONE TABLET BY MOUTH EVERY DAY        Low Sodium Diet (DASH Diet - Less than 2 grams of sodium per day).  Monitor blood pressure daily and log. Report consistent numbers greater than 140/90.  Maintain healthy weight with goal BMI <30. Exercise 30 minutes per day, 5 days per week.  Smoking cessation encouraged to aid in BP reduction.    2. Type 2 diabetes mellitus without complication, unspecified whether long term insulin use  Lab Results   Component Value Date    HGBA1C 7.4 (H) 12/07/2023    HGBA1C 6.8 06/09/2023    LDL 86.00 06/09/2023    CREATININE 0.74 06/09/2023      Patient reports she has not been compliant with diabetic diet due to holidays.  Reports that she will resume ADA diet and pay closer attention to car and sugar intake.  Will monitor blood glucose.  Notify clinic if blood glucose remains greater than 150.  Repeat A1c in 3 months.    Continue   Diabetes Medications               empagliflozin (JARDIANCE) 10 mg tablet Take 10 mg by mouth nightly.          On ACE and Statin according to guidelines.  Discussed caution with SGLT2s + diuretics as concomitant use can cause volume depletion. Discussed caution with DPP-Ivs and HF risk.  Follow ADA Diet. Avoid soda, simple sweets, and limit rice/pasta/breads/starches (no more than 45-50 grams per meal).  Maintain healthy weight with goal BMI <30.  Exercise 5 times per week for 30 minutes  per day.  Stressed importance of daily foot exams.  Stressed importance of annual dilated eye exam.    3. Acute right-sided low back pain with right-sided sciatica  Ibuprofen 800 mg 1 tab p.o. q.8 p.r.n. pain sent to pharmacy.  Hold meloxicam while taking.  Return to clinic if symptoms do not improve.      Follow up in about 3 months (around 3/11/2024) for DM. In addition to their scheduled follow up, the patient has also been instructed to follow up on as needed basis.     Future Appointments   Date Time Provider Department Center   1/17/2024  9:30 AM Dev Weaver MD [email or web address] ORTHO [facility]   3/11/2024  8:00 AM Radha Ponce FNP [facility] [[email or web address]] FAMMED [facility]   5/23/2024  7:30 AM Rach Pantoja ANP [facility]   6/17/2024  8:00 AM Radha Ponce FNP [facility] [email or web address]        BRUNO Darden

## 2023-12-18 ENCOUNTER — DOCUMENTATION ONLY (OUTPATIENT)
Dept: FAMILY MEDICINE | Facility: CLINIC | Age: 68
End: 2023-12-18
Payer: MEDICARE

## 2023-12-18 LAB
LEFT EYE DM RETINOPATHY: NORMAL
RIGHT EYE DM RETINOPATHY: NORMAL

## 2023-12-18 NOTE — PROGRESS NOTES
12/12/23 [facility] for diabetic eye exam requested results   12/18/23 patient diabatic eye exam in media return in a year.

## 2024-01-05 ENCOUNTER — PATIENT MESSAGE (OUTPATIENT)
Dept: ADMINISTRATIVE | Facility: OTHER | Age: 69
End: 2024-01-05
Payer: MEDICARE

## 2024-01-11 ENCOUNTER — TELEPHONE (OUTPATIENT)
Dept: FAMILY MEDICINE | Facility: CLINIC | Age: 69
End: 2024-01-11
Payer: MEDICARE

## 2024-01-11 DIAGNOSIS — R52 PAIN: Primary | ICD-10-CM

## 2024-01-11 RX ORDER — IBUPROFEN 800 MG/1
800 TABLET ORAL EVERY 8 HOURS PRN
Qty: 30 TABLET | Refills: 1 | Status: SHIPPED | OUTPATIENT
Start: 2024-01-11 | End: 2024-02-16 | Stop reason: SDUPTHER

## 2024-01-17 ENCOUNTER — TELEPHONE (OUTPATIENT)
Dept: FAMILY MEDICINE | Facility: CLINIC | Age: 69
End: 2024-01-17
Payer: MEDICARE

## 2024-01-17 ENCOUNTER — OFFICE VISIT (OUTPATIENT)
Dept: ORTHOPEDICS | Facility: CLINIC | Age: 69
End: 2024-01-17
Payer: MEDICARE

## 2024-01-17 VITALS — WEIGHT: 177 LBS | BODY MASS INDEX: 26.83 KG/M2 | HEIGHT: 68 IN

## 2024-01-17 DIAGNOSIS — E11.9 TYPE 2 DIABETES MELLITUS WITHOUT COMPLICATION, UNSPECIFIED WHETHER LONG TERM INSULIN USE: Primary | ICD-10-CM

## 2024-01-17 DIAGNOSIS — I10 HYPERTENSION, UNSPECIFIED TYPE: ICD-10-CM

## 2024-01-17 DIAGNOSIS — Z96.651 HISTORY OF TOTAL KNEE REPLACEMENT, RIGHT: Primary | ICD-10-CM

## 2024-01-17 PROCEDURE — 1101F PT FALLS ASSESS-DOCD LE1/YR: CPT | Mod: CPTII,,, | Performed by: ORTHOPAEDIC SURGERY

## 2024-01-17 PROCEDURE — 3008F BODY MASS INDEX DOCD: CPT | Mod: CPTII,,, | Performed by: ORTHOPAEDIC SURGERY

## 2024-01-17 PROCEDURE — 1160F RVW MEDS BY RX/DR IN RCRD: CPT | Mod: CPTII,,, | Performed by: ORTHOPAEDIC SURGERY

## 2024-01-17 PROCEDURE — 1159F MED LIST DOCD IN RCRD: CPT | Mod: CPTII,,, | Performed by: ORTHOPAEDIC SURGERY

## 2024-01-17 PROCEDURE — 4010F ACE/ARB THERAPY RXD/TAKEN: CPT | Mod: CPTII,,, | Performed by: ORTHOPAEDIC SURGERY

## 2024-01-17 PROCEDURE — 3288F FALL RISK ASSESSMENT DOCD: CPT | Mod: CPTII,,, | Performed by: ORTHOPAEDIC SURGERY

## 2024-01-17 PROCEDURE — 99213 OFFICE O/P EST LOW 20 MIN: CPT | Mod: ,,, | Performed by: ORTHOPAEDIC SURGERY

## 2024-01-17 RX ORDER — LISINOPRIL 10 MG/1
10 TABLET ORAL DAILY
Qty: 90 TABLET | Refills: 3 | Status: SHIPPED | OUTPATIENT
Start: 2024-01-17 | End: 2024-04-09 | Stop reason: SDUPTHER

## 2024-01-17 NOTE — PROGRESS NOTES
"Subjective:    CC: Follow-up of the Right Knee (R TKA 1/3/23 - pt states that she still has pain at the top of her knee and some on her shin. 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 )       HPI:  Patient returns today for repeat exam.  Patient is 1 year from her right total knee arthroplasty.  She is states she is doing very well.  Pain at times.    ROS: Refer to HPI for pertinent ROS. All other 12 point systems negative.    Objective:  Vitals:    01/17/24 0932   Weight: 80.3 kg (177 lb)   Height: 5' 8" (1.727 m)        Physical Exam:  Right lower extremity compartment soft and warm.  Skin is intact.  There is no signs symptoms of DVT or infection.  Her incision well healed there is no swelling there is no erythema there is no joint effusion.  Her motion is 0-120 degrees she is stable to stressing, neurovascular intact distally.    Images:  X-rays three views right knee demonstrate a well-aligned total knee arthroplasty. Images Reviewed and discussed with patient.    Assessment:  1. History of total knee replacement, right  - X-Ray Knee 3 View Right; Future        Plan:  At this time we discussed her physical exam and x-ray findings.  She is healed nicely she will continue low-impact activities, I would like see back with any problems or difficulties.    Follow UP: No follow-ups on file.              "

## 2024-01-17 NOTE — TELEPHONE ENCOUNTER
----- Message from Leanne Johnston sent at 1/17/2024  9:50 AM CST -----  Regarding: Refill  Pt. Needs Refill On Lisinopril 10MG, Onetouch Ultra Test Strp Call in At [facility]

## 2024-01-29 ENCOUNTER — TELEPHONE (OUTPATIENT)
Dept: FAMILY MEDICINE | Facility: CLINIC | Age: 69
End: 2024-01-29
Payer: MEDICARE

## 2024-02-16 ENCOUNTER — TELEPHONE (OUTPATIENT)
Dept: FAMILY MEDICINE | Facility: CLINIC | Age: 69
End: 2024-02-16
Payer: MEDICARE

## 2024-02-16 DIAGNOSIS — R52 PAIN: ICD-10-CM

## 2024-02-16 RX ORDER — IBUPROFEN 800 MG/1
800 TABLET ORAL EVERY 8 HOURS PRN
Qty: 30 TABLET | Refills: 11 | Status: SHIPPED | OUTPATIENT
Start: 2024-02-16 | End: 2024-03-11 | Stop reason: SDUPTHER

## 2024-02-27 DIAGNOSIS — E11.9 TYPE 2 DIABETES MELLITUS WITHOUT COMPLICATION, UNSPECIFIED WHETHER LONG TERM INSULIN USE: Primary | ICD-10-CM

## 2024-02-29 DIAGNOSIS — M25.562 LEFT KNEE PAIN, UNSPECIFIED CHRONICITY: Primary | ICD-10-CM

## 2024-02-29 RX ORDER — MELOXICAM 7.5 MG/1
7.5 TABLET ORAL
Qty: 30 TABLET | Refills: 11 | Status: SHIPPED | OUTPATIENT
Start: 2024-02-29 | End: 2024-03-11

## 2024-03-08 ENCOUNTER — LAB VISIT (OUTPATIENT)
Dept: LAB | Facility: HOSPITAL | Age: 69
End: 2024-03-08
Attending: NURSE PRACTITIONER
Payer: MEDICARE

## 2024-03-08 DIAGNOSIS — E11.9 TYPE 2 DIABETES MELLITUS WITHOUT COMPLICATION, UNSPECIFIED WHETHER LONG TERM INSULIN USE: ICD-10-CM

## 2024-03-08 LAB
EST. AVERAGE GLUCOSE BLD GHB EST-MCNC: 151.3 MG/DL
HBA1C MFR BLD: 6.9 %

## 2024-03-08 PROCEDURE — 83036 HEMOGLOBIN GLYCOSYLATED A1C: CPT

## 2024-03-08 PROCEDURE — 36415 COLL VENOUS BLD VENIPUNCTURE: CPT

## 2024-03-11 ENCOUNTER — OFFICE VISIT (OUTPATIENT)
Dept: FAMILY MEDICINE | Facility: CLINIC | Age: 69
End: 2024-03-11
Payer: MEDICARE

## 2024-03-11 VITALS
SYSTOLIC BLOOD PRESSURE: 131 MMHG | HEIGHT: 68 IN | HEART RATE: 65 BPM | RESPIRATION RATE: 18 BRPM | WEIGHT: 176.69 LBS | OXYGEN SATURATION: 96 % | BODY MASS INDEX: 26.78 KG/M2 | DIASTOLIC BLOOD PRESSURE: 76 MMHG | TEMPERATURE: 98 F

## 2024-03-11 DIAGNOSIS — E11.9 TYPE 2 DIABETES MELLITUS WITHOUT COMPLICATION, UNSPECIFIED WHETHER LONG TERM INSULIN USE: ICD-10-CM

## 2024-03-11 DIAGNOSIS — I10 PRIMARY HYPERTENSION: Primary | ICD-10-CM

## 2024-03-11 DIAGNOSIS — Z00.00 WELL ADULT EXAM: ICD-10-CM

## 2024-03-11 DIAGNOSIS — Z76.0 MEDICATION REFILL: ICD-10-CM

## 2024-03-11 PROCEDURE — 1126F AMNT PAIN NOTED NONE PRSNT: CPT | Mod: ,,, | Performed by: NURSE PRACTITIONER

## 2024-03-11 PROCEDURE — 3075F SYST BP GE 130 - 139MM HG: CPT | Mod: ,,, | Performed by: NURSE PRACTITIONER

## 2024-03-11 PROCEDURE — 3008F BODY MASS INDEX DOCD: CPT | Mod: ,,, | Performed by: NURSE PRACTITIONER

## 2024-03-11 PROCEDURE — 4010F ACE/ARB THERAPY RXD/TAKEN: CPT | Mod: ,,, | Performed by: NURSE PRACTITIONER

## 2024-03-11 PROCEDURE — 1160F RVW MEDS BY RX/DR IN RCRD: CPT | Mod: ,,, | Performed by: NURSE PRACTITIONER

## 2024-03-11 PROCEDURE — 1159F MED LIST DOCD IN RCRD: CPT | Mod: ,,, | Performed by: NURSE PRACTITIONER

## 2024-03-11 PROCEDURE — 1101F PT FALLS ASSESS-DOCD LE1/YR: CPT | Mod: ,,, | Performed by: NURSE PRACTITIONER

## 2024-03-11 PROCEDURE — 3044F HG A1C LEVEL LT 7.0%: CPT | Mod: ,,, | Performed by: NURSE PRACTITIONER

## 2024-03-11 PROCEDURE — 99213 OFFICE O/P EST LOW 20 MIN: CPT | Mod: ,,, | Performed by: NURSE PRACTITIONER

## 2024-03-11 PROCEDURE — 3288F FALL RISK ASSESSMENT DOCD: CPT | Mod: ,,, | Performed by: NURSE PRACTITIONER

## 2024-03-11 PROCEDURE — 3078F DIAST BP <80 MM HG: CPT | Mod: ,,, | Performed by: NURSE PRACTITIONER

## 2024-03-11 RX ORDER — IBUPROFEN 800 MG/1
800 TABLET ORAL EVERY 8 HOURS PRN
Qty: 90 TABLET | Refills: 3 | Status: SHIPPED | OUTPATIENT
Start: 2024-03-11

## 2024-03-11 RX ORDER — LEVOCETIRIZINE DIHYDROCHLORIDE 5 MG/1
5 TABLET, FILM COATED ORAL NIGHTLY
Qty: 30 TABLET | Refills: 11 | Status: SHIPPED | OUTPATIENT
Start: 2024-03-11 | End: 2025-03-11

## 2024-03-11 NOTE — PROGRESS NOTES
Patient ID: [id number]     Chief Complaint: Diabetes (3 mo fu)      HPI:     Rhoda Cesar is a 68 y.o. female here today for a follow up. No other complaints today.       Past Medical History:  has a past medical history of Arthritis, Diabetes mellitus, Digestive disorder, Hypertension, and Thyroid disease.    Surgical History:  has a past surgical history that includes Cholecystectomy; Knee arthroscopy; Tubal ligation; Appendectomy; Colonoscopy; and robotic arthroplasty, knee (Right, 1/3/2023).    Family History: family history includes Arrhythmia in her brother; Arthritis in her daughter and mother; Brain cancer in her brother; Cancer in her mother; Cervical cancer in her sister; Deep vein thrombosis in her sister; Diabetes in her mother and sister; Heart attack in her father; Heart failure in her brother and mother; Peripheral vascular disease in her father; Stroke in her brother, father, mother, and sister; Ulcerative colitis in her sister; Ulcers in her father.    Social History:  reports that she has never smoked. She has never used smokeless tobacco. She reports that she does not currently use alcohol. She reports that she does not currently use drugs.    Current Outpatient Medications   Medication Instructions    acetaminophen (TYLENOL) 650 mg, Oral, 2 times daily    ascorbic acid (vitamin C) (VITAMIN C) 500 mg, Oral, Daily    aspirin (ECOTRIN) 81 mg, Oral, Every 12 hours    blood sugar diagnostic (ONETOUCH ULTRA TEST) Strp 1 strip, Other, Daily    blood-glucose meter (ONETOUCH ULTRA2 METER) Misc USE TO CHECK BLOOD GLUCOSE LEVELS ONCE DAILY    chlorhexidine (PERIDEX) 0.12 % solution     conjugated estrogens (PREMARIN) 0.5 g, Vaginal, Twice weekly, Apply small amount to gloved finger and apply to vaginal walls.    ferrous sulfate (FEOSOL) Tab tablet 1 tablet, Oral, With breakfast    gabapentin (NEURONTIN) 300 MG capsule TAKE TWO CAPSULES BY MOUTH TWICE A DAY    ibuprofen (ADVIL,MOTRIN) 800 mg, Oral, Every  "8 hours PRN    JARDIANCE 10 mg, Oral, Nightly    levocetirizine (XYZAL) 5 mg, Oral, Nightly    lisinopriL 10 mg, Oral, Daily    multivitamin (THERAGRAN) per tablet 1 tablet, Oral, Daily    ONETOUCH DELICA PLUS LANCET 33 gauge Misc USE DAILY AND AS NEEDED FOR BLOOD GLUCOSE TESTING    pantoprazole (PROTONIX) 40 mg, Oral, Daily    rosuvastatin (CRESTOR) 10 mg, Oral, Daily    SYNTHROID 150 mcg, Oral, Daily    vitamin E 200 Units, Oral, Daily    zinc gluconate 50 mg, Oral, Daily       Patient is allergic to sulfa (sulfonamide antibiotics) and codeine.     Patient Care Team:  Radha Ponce FNP as PCP - General (Nurse Practitioner)  Cece Rojas LPN as Care Coordinator  #[id number], [employer]       Subjective:     Review of Systems    12 point review of systems conducted, negative except as stated in the history of present illness. See HPI for details.      Objective:     Visit Vitals  /76 (BP Location: Left arm, Patient Position: Sitting)   Pulse 65   Temp 97.9 °F (36.6 °C) (Oral)   Resp 18   Ht 5' 8" (1.727 m)   Wt 80.2 kg (176 lb 11.2 oz)   SpO2 96%   BMI 26.87 kg/m²       Physical Exam    General: Alert and oriented, No acute distress.  Head: Normocephalic, Atraumatic.  Eye: Pupils are equal, round and reactive to light, Extraocular movements are intact, Sclera non-icteric.  Ears/Nose/Throat: Normal, Mucosa moist,Clear.  Neck/Thyroid: Supple, Non-tender, No carotid bruit, No lymphadenopathy, No JVD, Full range of motion.  Respiratory: Clear to auscultation bilaterally; No wheezes, rales or rhonchi,Non-labored respirations, Symmetrical chest wall expansion.  Cardiovascular: Regular rate and rhythm, S1/S2 normal, No murmurs, rubs or gallops.  Gastrointestinal: Soft, Non-tender, Non-distended, Normal bowel sounds, No palpable organomegaly.  Musculoskeletal: Normal range of motion.  Integumentary: Warm, Dry, Intact, No suspicious lesions or rashes.  Extremities: No clubbing, cyanosis or " edema  Neurologic: No focal deficits, Cranial Nerves II-XII are grossly intact, Motor strength normal upper and lower extremities, Sensory exam intact.  Psychiatric: Normal interaction, Coherent speech, Euthymic mood, Appropriate affect     Labs Reviewed:     Chemistry:  Lab Results   Component Value Date     06/09/2023    K 5.0 06/09/2023    CHLORIDE 104 06/09/2023    BUN 15.1 06/09/2023    CREATININE 0.74 06/09/2023    EGFRNORACEVR >60 06/09/2023    GLUCOSE 142 (H) 06/09/2023    CALCIUM 9.7 06/09/2023    ALKPHOS 92 06/09/2023    LABPROT 7.5 06/09/2023    ALBUMIN 4.4 06/09/2023    BILIDIR 0.4 06/14/2021    IBILI 0.60 06/14/2021    AST 17 06/09/2023    ALT 19 06/09/2023    MG 2.0 09/01/2019    [id number] 52.6 06/09/2023    TSH 2.251 06/09/2023    SGLZNY2MSWV 1.30 01/04/2020        Lab Results   Component Value Date    HGBA1C 6.9 03/08/2024        Hematology:  Lab Results   Component Value Date    WBC 8.25 06/09/2023    HGB 15.0 06/09/2023    HCT 49.1 (H) 06/09/2023     06/09/2023       Lipid Panel:  Lab Results   Component Value Date    CHOL 161 06/09/2023    HDL 50 06/09/2023    LDL 86.00 06/09/2023    TRIG 127 06/09/2023    TOTALCHOLEST 3 06/09/2023        Urine:  Lab Results   Component Value Date    COLORUA Yellow 12/12/2022    APPEARANCEUA Hazy (A) 12/12/2022    SGUA 1.020 12/12/2022    PHUA 6.0 12/12/2022    PROTEINUA Negative 12/12/2022    GLUCOSEUA >=1000 (A) 12/12/2022    KETONESUA Negative 12/12/2022    BLOODUA Negative 12/12/2022    NITRITESUA Negative 12/12/2022    LEUKOCYTESUR Small (A) 12/12/2022    RBCUA None Seen 12/12/2022    WBCUA 21-50 (A) 12/12/2022    BACTERIA Few (A) 12/12/2022    SQEPUA  (A) 09/24/2019    HYALINECASTS 0 09/24/2019    CREATRANKITTYR 47.8 06/09/2023          Assessment:       ICD-10-CM ICD-9-CM   1. Primary hypertension  I10 401.9   2. Type 2 diabetes mellitus without complication, unspecified whether long term insulin use  E11.9 250.00   3. Medication refill   Z76.0 V68.1   4. Well adult exam  Z00.00 V70.0        Plan:   1. Primary hypertension  Well controlled.   Continue   Hypertension Medications               lisinopriL 10 MG tablet Take 1 tablet (10 mg total) by mouth once daily.        Low Sodium Diet (DASH Diet - Less than 2 grams of sodium per day).  Monitor blood pressure daily and log. Report consistent numbers greater than 140/90.  Maintain healthy weight with goal BMI <30. Exercise 30 minutes per day, 5 days per week.  Smoking cessation encouraged to aid in BP reduction.      2. Type 2 diabetes mellitus without complication, unspecified whether long term insulin use  Lab Results   Component Value Date    HGBA1C 6.9 03/08/2024    HGBA1C 7.4 (H) 12/07/2023    LDL 86.00 06/09/2023    CREATININE 0.74 06/09/2023      Continue   Diabetes Medications               empagliflozin (JARDIANCE) 10 mg tablet Take 10 mg by mouth nightly.            On ACE and Statin according to guidelines.  Discussed caution with SGLT2s + diuretics as concomitant use can cause volume depletion. Discussed caution with DPP-Ivs and HF risk.  Follow ADA Diet. Avoid soda, simple sweets, and limit rice/pasta/breads/starches (no more than 45-50 grams per meal).  Maintain healthy weight with goal BMI <30.  Exercise 5 times per week for 30 minutes per day.  Stressed importance of daily foot exams.  Stressed importance of annual dilated eye exam.    3. Medication refill  - ibuprofen (ADVIL,MOTRIN) 800 MG tablet; Take 1 tablet (800 mg total) by mouth every 8 (eight) hours as needed for Pain.  Dispense: 90 tablet; Refill: 3    4. Well adult exam  - CBC Auto Differential; Future  - Comprehensive Metabolic Panel; Future  - Lipid Panel; Future  - TSH; Future  - Hemoglobin A1C; Future  - Urinalysis; Future  - Microalbumin/Creatinine Ratio, Urine; Future  - Vitamin D; Future  - Urinalysis         Follow up in about 3 months (around 6/11/2024) for Wellness. In addition to their scheduled follow up, the  patient has also been instructed to follow up on as needed basis.     Future Appointments   Date Time Provider Department Center   3/13/2024  7:30 AM Rach Pantoja, CATHLEEN [facility] GYN [facility]   6/14/2024  8:00 AM Radha Ponce FNP [facility]   6/17/2024  8:00 AM Radha Ponce FNP [facility]        BRUNO Darden

## 2024-03-11 NOTE — PATIENT INSTRUCTIONS
Casey Duong,     If you are due for any health screening(s) below please notify me so we can arrange them to be ordered and scheduled. Most healthy patients at your age complete them, but you are free to accept or refuse.     If you can't do it, I'll definitely understand. If you can, I'd certainly appreciate it!    Tests to Keep You Healthy    Mammogram: DUE  Eye Exam: Met on 12/18/2023  Colon Cancer Screening: Met on 5/4/2017  Last Blood Pressure <= 139/89 (3/11/2024): Yes  Last HbA1c < 8 (03/08/2024): Yes      Schedule your breast cancer screening today     Breast cancer is the second most common cancer in women,  and the second leading cause of death from cancer. Mammograms can detect breast cancer early, which significantly increases the chances of curing the cancer.       Our records indicate that you may be overdue for breast cancer screening. Cancer screenings save lives, so schedule yours today to stay healthy.     If you recently had a mammogram performed outside of [facility], please let your Health care team know so that they can update your health record.

## 2024-04-05 ENCOUNTER — TELEPHONE (OUTPATIENT)
Dept: FAMILY MEDICINE | Facility: CLINIC | Age: 69
End: 2024-04-05
Payer: MEDICARE

## 2024-04-05 DIAGNOSIS — E11.9 TYPE 2 DIABETES MELLITUS WITHOUT COMPLICATION, UNSPECIFIED WHETHER LONG TERM INSULIN USE: Primary | ICD-10-CM

## 2024-04-05 RX ORDER — GLIPIZIDE 5 MG/1
5 TABLET ORAL
Qty: 60 TABLET | Refills: 11 | Status: SHIPPED | OUTPATIENT
Start: 2024-04-05 | End: 2025-04-05

## 2024-04-05 NOTE — TELEPHONE ENCOUNTER
Pt made aware. UV.    ----- Message from BRUNO Darden sent at 4/5/2024  9:40 AM CDT -----  Regarding: RE: med  DC Jardiance. Start glipizide 5mg po BID.    ----- Message -----  From: Ann Monteiro LPN  Sent: 4/1/2024   3:34 PM CDT  To: BRUNO Darden  Subject: FW: med                                          Pt requesting samples. Spoke with Dex approx 2 wks ago for samples. Have not received any as of yet. Please advise.   ----- Message -----  From: Meghann Mccracken  Sent: 4/1/2024   3:01 PM CDT  To: [facility] Staff  Subject: med                                              .Type:  Needs Medical Advice    Who Called: pt   Would the patient rather a call back or a response via [facility]?   Best Call Back Number:  [phone]  Additional Information: request med samples jardiance

## 2024-04-09 ENCOUNTER — TELEPHONE (OUTPATIENT)
Dept: FAMILY MEDICINE | Facility: CLINIC | Age: 69
End: 2024-04-09
Payer: MEDICARE

## 2024-04-09 DIAGNOSIS — I10 HYPERTENSION, UNSPECIFIED TYPE: ICD-10-CM

## 2024-04-09 RX ORDER — LISINOPRIL 10 MG/1
10 TABLET ORAL DAILY
Qty: 90 TABLET | Refills: 3 | Status: SHIPPED | OUTPATIENT
Start: 2024-04-09

## 2024-05-24 ENCOUNTER — TELEPHONE (OUTPATIENT)
Dept: FAMILY MEDICINE | Facility: CLINIC | Age: 69
End: 2024-05-24
Payer: MEDICARE

## 2024-06-15 ENCOUNTER — LAB VISIT (OUTPATIENT)
Dept: LAB | Facility: HOSPITAL | Age: 69
End: 2024-06-15
Attending: NURSE PRACTITIONER
Payer: MEDICARE

## 2024-06-15 DIAGNOSIS — Z00.00 WELL ADULT EXAM: ICD-10-CM

## 2024-06-15 LAB
25(OH)D3+25(OH)D2 SERPL-MCNC: 42 NG/ML (ref 30–80)
ALBUMIN SERPL-MCNC: 3.9 G/DL (ref 3.4–4.8)
ALBUMIN/GLOB SERPL: 1.3 RATIO (ref 1.1–2)
ALP SERPL-CCNC: 89 UNIT/L (ref 40–150)
ALT SERPL-CCNC: 27 UNIT/L (ref 0–55)
ANION GAP SERPL CALC-SCNC: 7 MEQ/L
AST SERPL-CCNC: 22 UNIT/L (ref 5–34)
BASOPHILS # BLD AUTO: 0.01 X10(3)/MCL
BASOPHILS NFR BLD AUTO: 0.2 %
BILIRUB SERPL-MCNC: 0.6 MG/DL
BUN SERPL-MCNC: 19.3 MG/DL (ref 9.8–20.1)
CALCIUM SERPL-MCNC: 9.6 MG/DL (ref 8.4–10.2)
CHLORIDE SERPL-SCNC: 111 MMOL/L (ref 98–107)
CHOLEST SERPL-MCNC: 147 MG/DL
CHOLEST/HDLC SERPL: 4 {RATIO} (ref 0–5)
CO2 SERPL-SCNC: 26 MMOL/L (ref 23–31)
CREAT SERPL-MCNC: 0.73 MG/DL (ref 0.55–1.02)
CREAT UR-MCNC: 129.5 MG/DL (ref 45–106)
CREAT/UREA NIT SERPL: 26
EOSINOPHIL # BLD AUTO: 0.15 X10(3)/MCL (ref 0–0.9)
EOSINOPHIL NFR BLD AUTO: 2.3 %
ERYTHROCYTE [DISTWIDTH] IN BLOOD BY AUTOMATED COUNT: 12.7 % (ref 11.5–17)
EST. AVERAGE GLUCOSE BLD GHB EST-MCNC: 142.7 MG/DL
GFR SERPLBLD CREATININE-BSD FMLA CKD-EPI: >60 ML/MIN/1.73/M2
GLOBULIN SER-MCNC: 2.9 GM/DL (ref 2.4–3.5)
GLUCOSE SERPL-MCNC: 152 MG/DL (ref 82–115)
HBA1C MFR BLD: 6.6 %
HCT VFR BLD AUTO: 43.7 % (ref 37–47)
HDLC SERPL-MCNC: 39 MG/DL (ref 35–60)
HGB BLD-MCNC: 13.7 G/DL (ref 12–16)
IMM GRANULOCYTES # BLD AUTO: 0.01 X10(3)/MCL (ref 0–0.04)
IMM GRANULOCYTES NFR BLD AUTO: 0.2 %
LDLC SERPL CALC-MCNC: 91 MG/DL (ref 50–140)
LYMPHOCYTES # BLD AUTO: 1.39 X10(3)/MCL (ref 0.6–4.6)
LYMPHOCYTES NFR BLD AUTO: 21 %
MCH RBC QN AUTO: 28.7 PG (ref 27–31)
MCHC RBC AUTO-ENTMCNC: 31.4 G/DL (ref 33–36)
MCV RBC AUTO: 91.4 FL (ref 80–94)
MICROALBUMIN UR-MCNC: 9.6 UG/ML
MICROALBUMIN/CREAT RATIO PNL UR: 7.4 MG/GM CR (ref 0–30)
MONOCYTES # BLD AUTO: 0.71 X10(3)/MCL (ref 0.1–1.3)
MONOCYTES NFR BLD AUTO: 10.7 %
NEUTROPHILS # BLD AUTO: 4.35 X10(3)/MCL (ref 2.1–9.2)
NEUTROPHILS NFR BLD AUTO: 65.6 %
PLATELET # BLD AUTO: 175 X10(3)/MCL (ref 130–400)
PMV BLD AUTO: 9.7 FL (ref 7.4–10.4)
POTASSIUM SERPL-SCNC: 5.2 MMOL/L (ref 3.5–5.1)
PROT SERPL-MCNC: 6.8 GM/DL (ref 5.8–7.6)
RBC # BLD AUTO: 4.78 X10(6)/MCL (ref 4.2–5.4)
SODIUM SERPL-SCNC: 144 MMOL/L (ref 136–145)
TRIGL SERPL-MCNC: 86 MG/DL (ref 37–140)
TSH SERPL-ACNC: 4.35 UIU/ML (ref 0.35–4.94)
VLDLC SERPL CALC-MCNC: 17 MG/DL
WBC # BLD AUTO: 6.62 X10(3)/MCL (ref 4.5–11.5)

## 2024-06-15 PROCEDURE — 82570 ASSAY OF URINE CREATININE: CPT

## 2024-06-15 PROCEDURE — 85025 COMPLETE CBC W/AUTO DIFF WBC: CPT

## 2024-06-15 PROCEDURE — 36415 COLL VENOUS BLD VENIPUNCTURE: CPT

## 2024-06-15 PROCEDURE — 82306 VITAMIN D 25 HYDROXY: CPT

## 2024-06-15 PROCEDURE — 84443 ASSAY THYROID STIM HORMONE: CPT

## 2024-06-15 PROCEDURE — 83036 HEMOGLOBIN GLYCOSYLATED A1C: CPT

## 2024-06-15 PROCEDURE — 80053 COMPREHEN METABOLIC PANEL: CPT

## 2024-06-15 PROCEDURE — 80061 LIPID PANEL: CPT

## 2024-06-17 ENCOUNTER — HOSPITAL ENCOUNTER (OUTPATIENT)
Dept: RADIOLOGY | Facility: HOSPITAL | Age: 69
Discharge: HOME | End: 2024-06-17
Attending: NURSE PRACTITIONER
Payer: MEDICARE

## 2024-06-17 ENCOUNTER — OFFICE VISIT (OUTPATIENT)
Dept: FAMILY MEDICINE | Facility: CLINIC | Age: 69
End: 2024-06-17
Payer: MEDICARE

## 2024-06-17 VITALS
DIASTOLIC BLOOD PRESSURE: 74 MMHG | BODY MASS INDEX: 29.3 KG/M2 | OXYGEN SATURATION: 93 % | HEART RATE: 65 BPM | HEIGHT: 66 IN | WEIGHT: 182.31 LBS | SYSTOLIC BLOOD PRESSURE: 119 MMHG | TEMPERATURE: 98 F

## 2024-06-17 DIAGNOSIS — K13.21 LEUKOPLAKIA OF ORAL MUCOSA, INCLUDING TONGUE: Primary | ICD-10-CM

## 2024-06-17 DIAGNOSIS — E11.9 ENCOUNTER FOR DIABETIC FOOT EXAM: ICD-10-CM

## 2024-06-17 DIAGNOSIS — Z78.0 POSTMENOPAUSAL: ICD-10-CM

## 2024-06-17 DIAGNOSIS — Z00.00 WELLNESS EXAMINATION: Primary | ICD-10-CM

## 2024-06-17 DIAGNOSIS — E11.9 TYPE 2 DIABETES MELLITUS WITHOUT COMPLICATION, UNSPECIFIED WHETHER LONG TERM INSULIN USE: ICD-10-CM

## 2024-06-17 DIAGNOSIS — Z12.31 ENCOUNTER FOR SCREENING MAMMOGRAM FOR BREAST CANCER: ICD-10-CM

## 2024-06-17 PROCEDURE — 3078F DIAST BP <80 MM HG: CPT | Mod: ,,, | Performed by: NURSE PRACTITIONER

## 2024-06-17 PROCEDURE — G0439 PPPS, SUBSEQ VISIT: HCPCS | Mod: ,,, | Performed by: NURSE PRACTITIONER

## 2024-06-17 PROCEDURE — 1159F MED LIST DOCD IN RCRD: CPT | Mod: ,,, | Performed by: NURSE PRACTITIONER

## 2024-06-17 PROCEDURE — 1160F RVW MEDS BY RX/DR IN RCRD: CPT | Mod: ,,, | Performed by: NURSE PRACTITIONER

## 2024-06-17 PROCEDURE — 1125F AMNT PAIN NOTED PAIN PRSNT: CPT | Mod: ,,, | Performed by: NURSE PRACTITIONER

## 2024-06-17 PROCEDURE — 4010F ACE/ARB THERAPY RXD/TAKEN: CPT | Mod: ,,, | Performed by: NURSE PRACTITIONER

## 2024-06-17 PROCEDURE — 3288F FALL RISK ASSESSMENT DOCD: CPT | Mod: ,,, | Performed by: NURSE PRACTITIONER

## 2024-06-17 PROCEDURE — 3074F SYST BP LT 130 MM HG: CPT | Mod: ,,, | Performed by: NURSE PRACTITIONER

## 2024-06-17 PROCEDURE — 3044F HG A1C LEVEL LT 7.0%: CPT | Mod: ,,, | Performed by: NURSE PRACTITIONER

## 2024-06-17 PROCEDURE — 3066F NEPHROPATHY DOC TX: CPT | Mod: ,,, | Performed by: NURSE PRACTITIONER

## 2024-06-17 PROCEDURE — 3061F NEG MICROALBUMINURIA REV: CPT | Mod: ,,, | Performed by: NURSE PRACTITIONER

## 2024-06-17 PROCEDURE — 1101F PT FALLS ASSESS-DOCD LE1/YR: CPT | Mod: ,,, | Performed by: NURSE PRACTITIONER

## 2024-06-17 PROCEDURE — 77080 DXA BONE DENSITY AXIAL: CPT | Mod: TC

## 2024-06-17 NOTE — PROGRESS NOTES
Patient ID: [id number]     Chief Complaint: wellness with lab review  (No concerns )      HPI:     Rhoda Cesar is a 68 y.o. female here today for an annual wellness visit. No other complaints today.       Health Maintenance   Topic Date Due    Foot Exam  Never done    DEXA Scan  01/20/2023    Mammogram  11/08/2023    Hemoglobin A1c  12/15/2024    Eye Exam  12/18/2024    Low Dose Statin  03/13/2025    Lipid Panel  06/15/2025    TETANUS VACCINE  10/11/2026    Colorectal Cancer Screening  05/04/2027    Hepatitis C Screening  Completed    Shingles Vaccine  Completed     Dental Exam - Recommend biannually  Vaccinations -   Immunization History   Administered Date(s) Administered    COVID-19, MRNA, LN-S, PF (Pfizer) (Gray Cap) 01/31/2022    COVID-19, MRNA, LN-S, PF (Pfizer) (Purple Cap) 03/09/2021, 03/30/2021    Influenza 10/10/2014    Influenza - Quadrivalent 10/11/2016    Influenza - Quadrivalent - High Dose - PF (65 years and older) 10/21/2020, 10/25/2021, 11/17/2022    Influenza - Quadrivalent - PF (6-35 months) 10/11/2017, 10/22/2018    Influenza - Quadrivalent - PF *Preferred* (6 months and older) 10/05/2009, 01/27/2014    Influenza - Trivalent - PF (ADULT) 10/05/2009, 01/27/2014, 10/03/2014, 10/19/2015, 10/11/2017, 10/15/2019, 10/15/2019    Pneumococcal 10/19/2015    Pneumococcal Conjugate - 13 Valent 10/22/2018    Pneumococcal Polysaccharide - 23 Valent 10/19/2015, 01/15/2020    Td (ADULT) 10/11/2016    Td - PF (ADULT) 10/11/2016    Zoster Recombinant 07/12/2021, 09/13/2021        Past Medical History:  has a past medical history of Arthritis, Diabetes mellitus, Digestive disorder, Hyperlipidemia, Hypertension, and Thyroid disease.    Surgical History:  has a past surgical history that includes Cholecystectomy; Knee arthroscopy; Tubal ligation; Appendectomy; Colonoscopy; and robotic arthroplasty, knee (Right, 1/3/2023).    Family History: family history includes Arrhythmia in her brother; Arthritis in her  daughter and mother; Brain cancer in her brother; Cancer in her mother; Cervical cancer in her sister; Deep vein thrombosis in her sister; Diabetes in her mother and sister; Heart attack in her father; Heart failure in her brother and mother; Peripheral vascular disease in her father; Stroke in her brother, father, mother, and sister; Ulcerative colitis in her sister; Ulcers in her father.    Social History:  reports that she has never smoked. She has never used smokeless tobacco. She reports that she does not currently use alcohol. She reports that she does not currently use drugs.    Current Outpatient Medications   Medication Instructions    acetaminophen (TYLENOL) 650 mg, Oral, 2 times daily    ascorbic acid (vitamin C) (VITAMIN C) 500 mg, Oral, Daily    aspirin (ECOTRIN) 81 mg, Oral, Every 12 hours    blood sugar diagnostic (ONETOUCH ULTRA TEST) Strp 1 strip, Other, Daily    blood-glucose meter (ONETOUCH ULTRA2 METER) Misc USE TO CHECK BLOOD GLUCOSE LEVELS ONCE DAILY    gabapentin (NEURONTIN) 300 MG capsule TAKE TWO CAPSULES BY MOUTH TWICE A DAY    glipiZIDE (GLUCOTROL) 5 mg, Oral, 2 times daily before meals    ibuprofen (ADVIL,MOTRIN) 800 mg, Oral, Every 8 hours PRN    levocetirizine (XYZAL) 5 mg, Oral, Nightly    lisinopriL 10 mg, Oral, Daily    multivitamin (THERAGRAN) per tablet 1 tablet, Oral, Daily    ONETOUCH DELICA PLUS LANCET 33 gauge Misc USE DAILY AND AS NEEDED FOR BLOOD GLUCOSE TESTING    pantoprazole (PROTONIX) 40 mg, Oral, Daily    rosuvastatin (CRESTOR) 10 mg, Oral, Daily    SYNTHROID 150 mcg, Oral, Daily    vitamin E 200 Units, Oral, Daily    zinc gluconate 50 mg, Oral, Daily       Patient is allergic to sulfa (sulfonamide antibiotics) and codeine.     Patient Care Team:  Radha Ponce FNP as PCP - General (Nurse Practitioner)  Cece Rojas LPN as Care Coordinator  #[id number], [facility]       Subjective:     Review of Systems    12 point review of systems conducted, negative except  "as stated in the history of present illness. See HPI for details.      Objective:     Visit Vitals  /74   Pulse 65   Temp 98.1 °F (36.7 °C)   Ht 5' 6" (1.676 m)   Wt 82.7 kg (182 lb 4.8 oz)   SpO2 (!) 93%   BMI 29.42 kg/m²       Physical Exam    General: Alert and oriented, No acute distress.  Head: Normocephalic, Atraumatic.  Eye: Pupils are equal, round and reactive to light, Extraocular movements are intact, Sclera non-icteric.  Ears/Nose/Throat: Normal, Mucosa moist,Clear.  Neck/Thyroid: Supple, Non-tender, No carotid bruit, No lymphadenopathy, No JVD, Full range of motion.  Respiratory: Clear to auscultation bilaterally; No wheezes, rales or rhonchi,Non-labored respirations, Symmetrical chest wall expansion.  Cardiovascular: Regular rate and rhythm, S1/S2 normal, No murmurs, rubs or gallops.  Gastrointestinal: Soft, Non-tender, Non-distended, Normal bowel sounds, No palpable organomegaly.  Musculoskeletal: Normal range of motion.  Integumentary: Warm, Dry, Intact, No suspicious lesions or rashes.  Extremities: No clubbing, cyanosis or edema  Neurologic: No focal deficits, Cranial Nerves II-XII are grossly intact, Motor strength normal upper and lower extremities, Sensory exam intact.  Psychiatric: Normal interaction, Coherent speech, Euthymic mood, Appropriate affect     Labs Reviewed:     Chemistry:  Lab Results   Component Value Date     06/15/2024    K 5.2 (H) 06/15/2024    BUN 19.3 06/15/2024    CREATININE 0.73 06/15/2024    EGFRNORACEVR >60 06/15/2024    GLUCOSE 152 (H) 06/15/2024    CALCIUM 9.6 06/15/2024    ALKPHOS 89 06/15/2024    LABPROT 6.8 06/15/2024    ALBUMIN 3.9 06/15/2024    BILIDIR 0.4 06/14/2021    IBILI 0.60 06/14/2021    AST 22 06/15/2024    ALT 27 06/15/2024    MG 2.0 09/01/2019    [id number] 42 06/15/2024    TSH 4.352 06/15/2024    VJSVXB5PNEW 1.30 01/04/2020        Lab Results   Component Value Date    HGBA1C 6.6 06/15/2024        Hematology:  Lab Results   Component Value " Date    WBC 6.62 06/15/2024    HGB 13.7 06/15/2024    HCT 43.7 06/15/2024     06/15/2024       Lipid Panel:  Lab Results   Component Value Date    CHOL 147 06/15/2024    HDL 39 06/15/2024    LDL 91.00 06/15/2024    TRIG 86 06/15/2024    TOTALCHOLEST 4 06/15/2024        Urine:  Lab Results   Component Value Date    APPEARANCEUA Hazy (A) 12/12/2022    SGUA 1.020 12/12/2022    PROTEINUA Negative 12/12/2022    KETONESUA Negative 12/12/2022    LEUKOCYTESUR Small (A) 12/12/2022    RBCUA None Seen 12/12/2022    WBCUA 21-50 (A) 12/12/2022    BACTERIA Few (A) 12/12/2022    SQEPUA  (A) 09/24/2019    HYALINECASTS 0 09/24/2019    CREATRANDUR 129.5 (H) 06/15/2024          Assessment:       ICD-10-CM ICD-9-CM   1. Wellness examination  Z00.00 V70.0   2. Type 2 diabetes mellitus without complication, unspecified whether long term insulin use  E11.9 250.00   3. Encounter for screening mammogram for breast cancer  Z12.31 V76.12   4. Encounter for diabetic foot exam  E11.9 250.00   5. Postmenopausal  Z78.0 V49.81        Plan:   1. Wellness examination  Healthy lifestyle discussed.  Breast cancer screening ordered.    2. Type 2 diabetes mellitus without complication, unspecified whether long term insulin use  Lab Results   Component Value Date    HGBA1C 6.6 06/15/2024    HGBA1C 6.9 03/08/2024    LDL 91.00 06/15/2024    CREATININE 0.73 06/15/2024      Controlled.  Continue  Diabetes Medications                    glipiZIDE (GLUCOTROL) 5 MG tablet Take 1 tablet (5 mg total) by mouth 2 (two) times daily before meals.        Follow ADA Diet. Avoid soda, simple sweets, and limit rice/pasta/breads/starches (no more than 45-50 grams per meal).  Maintain healthy weight with goal BMI <30.  Exercise 5 times per week for 30 minutes per day.  Stressed importance of daily foot exams.  Stressed importance of annual dilated eye exam.    3. Encounter for screening mammogram for breast cancer  - Mammo Digital Screening Bilat w/ Brody;  Future    4. Encounter for diabetic foot exam  Referral sent to Podiatry.    5. Postmenopausal  - DXA Bone Density Axial Skeleton 1 or more sites; Future      I offered to discuss advanced care planning, including how to pick a person who would make decisions for you if you were unable to make them for yourself, called a health care power of , and what kind of decisions you might make such as use of life sustaining treatments such as ventilators and tube feeding when faced with a life limiting illness.    Follow up in about 6 months (around 12/17/2024) for DM, Hyperlipidemia. In addition to their scheduled follow up, the patient has also been instructed to follow up on as needed basis.     Future Appointments   Date Time Provider Department Center   6/17/2024  9:00 AM [facility] DEXA1 300 LB LIMIT [facility] XRAY [facility]   6/19/2024  7:30 AM [facility] MAMMO1 [facility] MAMMO [facility]   6/19/2025  8:30 AM Radha Ponce FNP [facility] FAMMED [facility] Cl        BRUNO Darden

## 2024-06-17 NOTE — PATIENT INSTRUCTIONS
Casey Duong,     If you are due for any health screening(s) below please notify me so we can arrange them to be ordered and scheduled. Most healthy patients at your age complete them, but you are free to accept or refuse.     If you can't do it, I'll definitely understand. If you can, I'd certainly appreciate it!    Tests to Keep You Healthy    Mammogram: ORDERED BUT NOT SCHEDULED  Eye Exam: Met on 12/18/2023  Colon Cancer Screening: Met on 5/4/2017  Last Blood Pressure <= 139/89 (3/11/2024): Yes  Last HbA1c < 8 (06/15/2024): Yes      Schedule your breast cancer screening today     Breast cancer is the second most common cancer in women,  and the second leading cause of death from cancer. Mammograms can detect breast cancer early, which significantly increases the chances of curing the cancer.       Our records indicate that you may be overdue for breast cancer screening. Cancer screenings save lives, so schedule yours today to stay healthy.     If you recently had a mammogram performed outside of [facility], please let your Health care team know so that they can update your health record.

## 2024-06-19 ENCOUNTER — HOSPITAL ENCOUNTER (OUTPATIENT)
Dept: RADIOLOGY | Facility: HOSPITAL | Age: 69
Discharge: HOME | End: 2024-06-19
Attending: NURSE PRACTITIONER
Payer: MEDICARE

## 2024-06-19 ENCOUNTER — TELEPHONE (OUTPATIENT)
Dept: FAMILY MEDICINE | Facility: CLINIC | Age: 69
End: 2024-06-19
Payer: MEDICARE

## 2024-06-19 DIAGNOSIS — Z12.31 ENCOUNTER FOR SCREENING MAMMOGRAM FOR BREAST CANCER: ICD-10-CM

## 2024-06-19 DIAGNOSIS — M85.851 OSTEOPENIA OF RIGHT HIP: Primary | ICD-10-CM

## 2024-06-19 PROCEDURE — 77067 SCR MAMMO BI INCL CAD: CPT | Mod: 26,,, | Performed by: RADIOLOGY

## 2024-06-19 PROCEDURE — 77063 BREAST TOMOSYNTHESIS BI: CPT | Mod: 26,,, | Performed by: RADIOLOGY

## 2024-06-19 PROCEDURE — 77067 SCR MAMMO BI INCL CAD: CPT | Mod: TC

## 2024-06-19 RX ORDER — ALENDRONATE SODIUM 70 MG/1
70 TABLET ORAL
Qty: 4 TABLET | Refills: 11 | Status: SHIPPED | OUTPATIENT
Start: 2024-06-19 | End: 2025-06-19

## 2024-06-19 NOTE — TELEPHONE ENCOUNTER
----- Message from BRUNO Darden sent at 6/19/2024  3:56 PM CDT -----  Please inform patient of results.    1. Osteopenia to right hip.  Would recommend Fosamax if patient is in agreement.    All other findings within acceptable ranges.

## 2024-06-20 ENCOUNTER — TELEPHONE (OUTPATIENT)
Dept: FAMILY MEDICINE | Facility: CLINIC | Age: 69
End: 2024-06-20
Payer: MEDICARE

## 2024-06-20 NOTE — TELEPHONE ENCOUNTER
Pt aware. Understanding voiced.   ----- Message from BRUNO Darden sent at 6/20/2024  8:22 AM CDT -----  Normal MMG. Repeat in 1 year.

## 2024-07-08 DIAGNOSIS — M19.90 ARTHRITIS: ICD-10-CM

## 2024-07-08 RX ORDER — GABAPENTIN 300 MG/1
600 CAPSULE ORAL 2 TIMES DAILY
Qty: 120 CAPSULE | Refills: 11 | Status: SHIPPED | OUTPATIENT
Start: 2024-07-08

## 2024-07-11 DIAGNOSIS — E03.9 HYPOTHYROIDISM, UNSPECIFIED TYPE: ICD-10-CM

## 2024-07-11 RX ORDER — LEVOTHYROXINE SODIUM 150 UG/1
150 TABLET ORAL DAILY
Qty: 30 TABLET | Refills: 11 | Status: SHIPPED | OUTPATIENT
Start: 2024-07-11

## 2024-07-15 DIAGNOSIS — E11.9 TYPE 2 DIABETES MELLITUS WITHOUT COMPLICATION, UNSPECIFIED WHETHER LONG TERM INSULIN USE: ICD-10-CM

## 2024-07-15 DIAGNOSIS — E03.9 HYPOTHYROIDISM, UNSPECIFIED TYPE: ICD-10-CM

## 2024-07-15 DIAGNOSIS — M19.90 ARTHRITIS: ICD-10-CM

## 2024-07-15 DIAGNOSIS — Z76.0 MEDICATION REFILL: Primary | ICD-10-CM

## 2024-07-15 RX ORDER — GABAPENTIN 300 MG/1
600 CAPSULE ORAL 2 TIMES DAILY
Qty: 120 CAPSULE | Refills: 11 | OUTPATIENT
Start: 2024-07-15

## 2024-07-15 RX ORDER — LEVOTHYROXINE SODIUM 150 UG/1
150 TABLET ORAL DAILY
Qty: 30 TABLET | Refills: 11 | OUTPATIENT
Start: 2024-07-15

## 2024-07-19 DIAGNOSIS — Z76.0 MEDICATION REFILL: ICD-10-CM

## 2024-07-19 RX ORDER — IBUPROFEN 800 MG/1
800 TABLET ORAL EVERY 8 HOURS PRN
Qty: 90 TABLET | Refills: 3 | Status: SHIPPED | OUTPATIENT
Start: 2024-07-19

## 2024-08-22 ENCOUNTER — TELEPHONE (OUTPATIENT)
Dept: FAMILY MEDICINE | Facility: CLINIC | Age: 69
End: 2024-08-22
Payer: MEDICARE

## 2024-08-22 NOTE — TELEPHONE ENCOUNTER
----- Message from Nupur Tan sent at 8/21/2024  8:24 AM CDT -----  Who Called: Rhoda Cesar    Caller is requesting assistance/information from provider's office.    Symptoms (please be specific):    How long has patient had these symptoms:    List of preferred pharmacies on file (remove unneeded): [unfilled]  If different, enter pharmacy into here including location and phone number:       Patient's Preferred Phone Number on File: [phone]   Best Call Back Number, if different:  Additional Information: pt called to inform nurse she is scheduled with a local ENT , has appt sched 10/01 at with Paula Tai ([phone])

## 2024-09-12 DIAGNOSIS — K21.9 GASTROESOPHAGEAL REFLUX DISEASE, UNSPECIFIED WHETHER ESOPHAGITIS PRESENT: ICD-10-CM

## 2024-09-12 RX ORDER — PANTOPRAZOLE SODIUM 40 MG/1
40 TABLET, DELAYED RELEASE ORAL DAILY
Qty: 30 TABLET | Refills: 11 | Status: SHIPPED | OUTPATIENT
Start: 2024-09-12

## 2024-11-18 DIAGNOSIS — Z76.0 MEDICATION REFILL: ICD-10-CM

## 2024-12-03 LAB
LEFT EYE DM RETINOPATHY: NEGATIVE
RIGHT EYE DM RETINOPATHY: NEGATIVE

## 2024-12-16 ENCOUNTER — TELEPHONE (OUTPATIENT)
Dept: FAMILY MEDICINE | Facility: CLINIC | Age: 69
End: 2024-12-16
Payer: MEDICARE

## 2024-12-16 DIAGNOSIS — E11.9 TYPE 2 DIABETES MELLITUS WITHOUT COMPLICATION, UNSPECIFIED WHETHER LONG TERM INSULIN USE: Primary | ICD-10-CM

## 2024-12-16 DIAGNOSIS — E78.5 HYPERLIPIDEMIA, UNSPECIFIED HYPERLIPIDEMIA TYPE: ICD-10-CM

## 2024-12-18 ENCOUNTER — LAB VISIT (OUTPATIENT)
Dept: LAB | Facility: HOSPITAL | Age: 69
End: 2024-12-18
Attending: NURSE PRACTITIONER
Payer: MEDICARE

## 2024-12-18 DIAGNOSIS — E11.9 TYPE 2 DIABETES MELLITUS WITHOUT COMPLICATION, UNSPECIFIED WHETHER LONG TERM INSULIN USE: ICD-10-CM

## 2024-12-18 DIAGNOSIS — E78.5 HYPERLIPIDEMIA, UNSPECIFIED HYPERLIPIDEMIA TYPE: ICD-10-CM

## 2024-12-18 LAB
ALBUMIN SERPL-MCNC: 4 G/DL (ref 3.4–4.8)
ALBUMIN/GLOB SERPL: 1.4 RATIO (ref 1.1–2)
ALP SERPL-CCNC: 68 UNIT/L (ref 40–150)
ALT SERPL-CCNC: 31 UNIT/L (ref 0–55)
ANION GAP SERPL CALC-SCNC: 5 MEQ/L
AST SERPL-CCNC: 23 UNIT/L (ref 5–34)
BILIRUB SERPL-MCNC: 0.7 MG/DL
BUN SERPL-MCNC: 13.4 MG/DL (ref 9.8–20.1)
CALCIUM SERPL-MCNC: 9.5 MG/DL (ref 8.4–10.2)
CHLORIDE SERPL-SCNC: 105 MMOL/L (ref 98–107)
CHOLEST SERPL-MCNC: 156 MG/DL
CHOLEST/HDLC SERPL: 4 {RATIO} (ref 0–5)
CO2 SERPL-SCNC: 28 MMOL/L (ref 23–31)
CREAT SERPL-MCNC: 0.67 MG/DL (ref 0.55–1.02)
CREAT/UREA NIT SERPL: 20
EST. AVERAGE GLUCOSE BLD GHB EST-MCNC: 157.1 MG/DL
GFR SERPLBLD CREATININE-BSD FMLA CKD-EPI: >60 ML/MIN/1.73/M2
GLOBULIN SER-MCNC: 2.9 GM/DL (ref 2.4–3.5)
GLUCOSE SERPL-MCNC: 180 MG/DL (ref 82–115)
HBA1C MFR BLD: 7.1 %
HDLC SERPL-MCNC: 43 MG/DL (ref 35–60)
LDLC SERPL CALC-MCNC: 94 MG/DL (ref 50–140)
POTASSIUM SERPL-SCNC: 4.9 MMOL/L (ref 3.5–5.1)
PROT SERPL-MCNC: 6.9 GM/DL (ref 5.8–7.6)
SODIUM SERPL-SCNC: 138 MMOL/L (ref 136–145)
TRIGL SERPL-MCNC: 95 MG/DL (ref 37–140)
VLDLC SERPL CALC-MCNC: 19 MG/DL

## 2024-12-18 PROCEDURE — 36415 COLL VENOUS BLD VENIPUNCTURE: CPT

## 2024-12-18 PROCEDURE — 80053 COMPREHEN METABOLIC PANEL: CPT

## 2024-12-18 PROCEDURE — 83036 HEMOGLOBIN GLYCOSYLATED A1C: CPT

## 2024-12-18 PROCEDURE — 80061 LIPID PANEL: CPT

## 2024-12-23 ENCOUNTER — HOSPITAL ENCOUNTER (OUTPATIENT)
Dept: RADIOLOGY | Facility: HOSPITAL | Age: 69
Discharge: HOME | End: 2024-12-23
Attending: NURSE PRACTITIONER
Payer: MEDICARE

## 2024-12-23 ENCOUNTER — OFFICE VISIT (OUTPATIENT)
Dept: FAMILY MEDICINE | Facility: CLINIC | Age: 69
End: 2024-12-23
Payer: MEDICARE

## 2024-12-23 VITALS
SYSTOLIC BLOOD PRESSURE: 127 MMHG | TEMPERATURE: 98 F | BODY MASS INDEX: 30.24 KG/M2 | HEART RATE: 60 BPM | RESPIRATION RATE: 18 BRPM | DIASTOLIC BLOOD PRESSURE: 63 MMHG | HEIGHT: 66 IN | WEIGHT: 188.19 LBS | OXYGEN SATURATION: 98 %

## 2024-12-23 DIAGNOSIS — M25.552 LEFT HIP PAIN: ICD-10-CM

## 2024-12-23 DIAGNOSIS — K21.9 GASTROESOPHAGEAL REFLUX DISEASE, UNSPECIFIED WHETHER ESOPHAGITIS PRESENT: ICD-10-CM

## 2024-12-23 DIAGNOSIS — E11.9 TYPE 2 DIABETES MELLITUS WITHOUT COMPLICATION, UNSPECIFIED WHETHER LONG TERM INSULIN USE: ICD-10-CM

## 2024-12-23 DIAGNOSIS — I10 PRIMARY HYPERTENSION: Primary | ICD-10-CM

## 2024-12-23 DIAGNOSIS — E78.5 HYPERLIPIDEMIA, UNSPECIFIED HYPERLIPIDEMIA TYPE: ICD-10-CM

## 2024-12-23 PROCEDURE — 3061F NEG MICROALBUMINURIA REV: CPT | Mod: ,,, | Performed by: NURSE PRACTITIONER

## 2024-12-23 PROCEDURE — 3288F FALL RISK ASSESSMENT DOCD: CPT | Mod: ,,, | Performed by: NURSE PRACTITIONER

## 2024-12-23 PROCEDURE — 73502 X-RAY EXAM HIP UNI 2-3 VIEWS: CPT | Mod: TC,LT

## 2024-12-23 PROCEDURE — G2211 COMPLEX E/M VISIT ADD ON: HCPCS | Mod: ,,, | Performed by: NURSE PRACTITIONER

## 2024-12-23 PROCEDURE — 3074F SYST BP LT 130 MM HG: CPT | Mod: ,,, | Performed by: NURSE PRACTITIONER

## 2024-12-23 PROCEDURE — 1125F AMNT PAIN NOTED PAIN PRSNT: CPT | Mod: ,,, | Performed by: NURSE PRACTITIONER

## 2024-12-23 PROCEDURE — 3066F NEPHROPATHY DOC TX: CPT | Mod: ,,, | Performed by: NURSE PRACTITIONER

## 2024-12-23 PROCEDURE — 1101F PT FALLS ASSESS-DOCD LE1/YR: CPT | Mod: ,,, | Performed by: NURSE PRACTITIONER

## 2024-12-23 PROCEDURE — 1160F RVW MEDS BY RX/DR IN RCRD: CPT | Mod: ,,, | Performed by: NURSE PRACTITIONER

## 2024-12-23 PROCEDURE — 3078F DIAST BP <80 MM HG: CPT | Mod: ,,, | Performed by: NURSE PRACTITIONER

## 2024-12-23 PROCEDURE — 4010F ACE/ARB THERAPY RXD/TAKEN: CPT | Mod: ,,, | Performed by: NURSE PRACTITIONER

## 2024-12-23 PROCEDURE — 3008F BODY MASS INDEX DOCD: CPT | Mod: ,,, | Performed by: NURSE PRACTITIONER

## 2024-12-23 PROCEDURE — 1159F MED LIST DOCD IN RCRD: CPT | Mod: ,,, | Performed by: NURSE PRACTITIONER

## 2024-12-23 PROCEDURE — 3051F HG A1C>EQUAL 7.0%<8.0%: CPT | Mod: ,,, | Performed by: NURSE PRACTITIONER

## 2024-12-23 PROCEDURE — 99214 OFFICE O/P EST MOD 30 MIN: CPT | Mod: ,,, | Performed by: NURSE PRACTITIONER

## 2024-12-23 RX ORDER — MINOCYCLINE HYDROCHLORIDE 100 MG/1
100 CAPSULE ORAL 2 TIMES DAILY
COMMUNITY
Start: 2024-12-18

## 2024-12-23 RX ORDER — PANTOPRAZOLE SODIUM 40 MG/1
40 TABLET, DELAYED RELEASE ORAL DAILY
Qty: 30 TABLET | Refills: 11 | Status: SHIPPED | OUTPATIENT
Start: 2024-12-23

## 2024-12-23 RX ORDER — MONTELUKAST SODIUM 10 MG/1
10 TABLET ORAL DAILY
COMMUNITY
Start: 2024-12-04

## 2024-12-23 NOTE — PROGRESS NOTES
Patient ID: [id number]     Chief Complaint: Diabetes (6 mo follow-up) and Hyperlipidemia      HPI:     Rhoda Cesar is a 69 y.o. female here today for a follow up.  Of occasional left hip pain for 2 weeks.  Patient denies any trauma or injury.  Patient rates pain 3/10.  Patient reports pain begins in left hip and radiates to left leg.  Reports mild relief with Tylenol and ibuprofen OTC.    Health Maintenance   Topic Date Due    Foot Exam  Never done    Low Dose Statin  Never done    RSV Vaccine (Age 60+ and Pregnant patients) (1 - Risk 60-74 years 1-dose series) Never done    COVID-19 Vaccine (4 - 2024-25 season) 09/01/2024    Eye Exam  12/18/2024    Diabetes Urine Screening  06/15/2025    Hemoglobin A1c  06/18/2025    Mammogram  06/19/2025    Lipid Panel  12/18/2025    TETANUS VACCINE  10/11/2026    Colorectal Cancer Screening  05/04/2027    DEXA Scan  06/17/2027    Hepatitis C Screening  Completed    Shingles Vaccine  Completed    Influenza Vaccine  Completed    Pneumococcal Vaccines (Age 50+)  Completed       Past Medical History:  has a past medical history of Arthritis, Diabetes mellitus, Digestive disorder, Hyperlipidemia, Hypertension, and Thyroid disease.    Surgical History:  has a past surgical history that includes Cholecystectomy; Knee arthroscopy; Tubal ligation; Appendectomy; Colonoscopy; and robotic arthroplasty, knee (Right, 1/3/2023).    Family History: family history includes Arrhythmia in her brother; Arthritis in her daughter and mother; Brain cancer in her brother; Cancer in her mother; Cervical cancer in her sister; Deep vein thrombosis in her sister; Diabetes in her mother and sister; Heart attack in her father; Heart failure in her brother and mother; Peripheral vascular disease in her father; Stroke in her brother, father, mother, and sister; Ulcerative colitis in her sister; Ulcers in her father.    Social History:  reports that she has never smoked. She has never used smokeless  "tobacco. She reports that she does not currently use alcohol. She reports that she does not currently use drugs.    Current Outpatient Medications   Medication Instructions    acetaminophen (TYLENOL) 650 mg, 2 times daily    alendronate (FOSAMAX) 70 mg, Oral, Every 7 days    ascorbic acid (vitamin C) (VITAMIN C) 500 mg, Daily    aspirin (ECOTRIN) 81 mg, Oral, Every 12 hours    blood sugar diagnostic (ONETOUCH ULTRA TEST) Strp 1 strip, Other, Daily    blood-glucose meter (ONETOUCH ULTRA2 METER) Misc USE TO CHECK BLOOD GLUCOSE LEVELS ONCE DAILY    gabapentin (NEURONTIN) 600 mg, Oral, 2 times daily    glipiZIDE (GLUCOTROL) 5 mg, Oral, 2 times daily before meals    ibuprofen (ADVIL,MOTRIN) 800 mg, Oral, Every 8 hours PRN    levocetirizine (XYZAL) 5 mg, Oral, Nightly    lisinopriL 10 mg, Oral, Daily    minocycline (MINOCIN,DYNACIN) 100 mg, 2 times daily    montelukast (SINGULAIR) 10 mg, Daily    multivitamin (THERAGRAN) per tablet 1 tablet, Daily    ONETOUCH DELICA PLUS LANCET 33 gauge Misc USE DAILY AND AS NEEDED FOR BLOOD GLUCOSE TESTING    pantoprazole (PROTONIX) 40 mg, Oral, Daily    rosuvastatin (CRESTOR) 10 mg, Oral, Daily    SYNTHROID 150 mcg, Oral, Daily    vitamin E 200 Units, Daily    zinc gluconate 50 mg, Daily       Patient is allergic to sulfa (sulfonamide antibiotics) and codeine.     Patient Care Team:  Radha Ponce FNP as PCP - General (Nurse Practitioner)  Cece Rojas LPN as Care Coordinator  #[id number], [facility]       Subjective:     Review of Systems    12 point review of systems conducted, negative except as stated in the history of present illness. See HPI for details.      Objective:     Visit Vitals  /63 (BP Location: Left arm, Patient Position: Sitting)   Pulse 60   Temp 98.3 °F (36.8 °C) (Oral)   Resp 18   Ht 5' 6" (1.676 m)   Wt 85.4 kg (188 lb 3.2 oz)   SpO2 98%   BMI 30.38 kg/m²       Physical Exam    General: Alert and oriented, No acute distress.  Head: Normocephalic, " Atraumatic.  Eye: Pupils are equal, round and reactive to light, Extraocular movements are intact, Sclera non-icteric.  Ears/Nose/Throat: Normal, Mucosa moist,Clear.  Neck/Thyroid: Supple, Non-tender, No carotid bruit, No lymphadenopathy, No JVD, Full range of motion.  Respiratory: Clear to auscultation bilaterally; No wheezes, rales or rhonchi,Non-labored respirations, Symmetrical chest wall expansion.  Cardiovascular: Regular rate and rhythm, S1/S2 normal, No murmurs, rubs or gallops.  Gastrointestinal: Soft, Non-tender, Non-distended, Normal bowel sounds, No palpable organomegaly.  Musculoskeletal:  Decreased range of motion to left hip.  Integumentary: Warm, Dry, Intact, No suspicious lesions or rashes.  Extremities: No clubbing, cyanosis or edema  Neurologic: No focal deficits, Cranial Nerves II-XII are grossly intact, Motor strength normal upper and lower extremities, Sensory exam intact.  Psychiatric: Normal interaction, Coherent speech, Euthymic mood, Appropriate affect     Labs Reviewed:     Chemistry:  Lab Results   Component Value Date     12/18/2024    K 4.9 12/18/2024    BUN 13.4 12/18/2024    CREATININE 0.67 12/18/2024    EGFRNORACEVR >60 12/18/2024    GLUCOSE 180 (H) 12/18/2024    CALCIUM 9.5 12/18/2024    ALKPHOS 68 12/18/2024    LABPROT 6.9 12/18/2024    ALBUMIN 4.0 12/18/2024    BILIDIR 0.4 06/14/2021    IBILI 0.60 06/14/2021    AST 23 12/18/2024    ALT 31 12/18/2024    MG 2.0 09/01/2019    [id number] 42 06/15/2024    TSH 4.352 06/15/2024    IYCYOL2WZOQ 1.30 01/04/2020        Lab Results   Component Value Date    HGBA1C 7.1 (H) 12/18/2024        Hematology:  Lab Results   Component Value Date    WBC 6.62 06/15/2024    HGB 13.7 06/15/2024    HCT 43.7 06/15/2024     06/15/2024       Lipid Panel:  Lab Results   Component Value Date    CHOL 156 12/18/2024    HDL 43 12/18/2024    LDL 94.00 12/18/2024    TRIG 95 12/18/2024    TOTALCHOLEST 4 12/18/2024        Urine:  Lab Results   Component  Value Date    APPEARANCEUA Hazy (A) 12/12/2022    SGUA 1.020 12/12/2022    PROTEINUA Negative 12/12/2022    KETONESUA Negative 12/12/2022    LEUKOCYTESUR Small (A) 12/12/2022    RBCUA None Seen 12/12/2022    WBCUA 21-50 (A) 12/12/2022    BACTERIA Few (A) 12/12/2022    SQEPUA  (A) 09/24/2019    HYALINECASTS 0 09/24/2019    CREATRANDUR 129.5 (H) 06/15/2024          Assessment:       ICD-10-CM ICD-9-CM   1. Primary hypertension  I10 401.9   2. Hyperlipidemia, unspecified hyperlipidemia type  E78.5 272.4   3. Type 2 diabetes mellitus without complication, unspecified whether long term insulin use  E11.9 250.00   4. Left hip pain  M25.552 719.45   5. Gastroesophageal reflux disease, unspecified whether esophagitis present  K21.9 530.81        Plan:   1. Primary hypertension (Primary)  Well controlled.   Continue  Hypertension Medications               lisinopriL 10 MG tablet Take 1 tablet (10 mg total) by mouth once daily.        Low Sodium Diet (DASH Diet - Less than 2 grams of sodium per day).  Monitor blood pressure daily and log. Report consistent numbers greater than 140/90.  Maintain healthy weight with goal BMI <30. Exercise 30 minutes per day, 5 days per week.  Smoking cessation encouraged to aid in BP reduction.    2. Hyperlipidemia, unspecified hyperlipidemia type  Lab Results   Component Value Date    LDL 94.00 12/18/2024    TRIG 95 12/18/2024    HDL 43 12/18/2024    TOTALCHOLEST 4 12/18/2024     Controlled.  Continue   Hyperlipidemia Medications               rosuvastatin (CRESTOR) 10 MG tablet Take 1 tablet (10 mg total) by mouth once daily.        Stressed importance of dietary modifications. Follow a low cholesterol, low saturated fat diet with less that 200mg of cholesterol a day.  Avoid fried foods and high saturated fats (high saturated fats less than 7% of calories).  Add Flax Seed/Fish Oil supplements to diet. Increase dietary fiber.  Regular exercise can reduce LDL and raise HDL. Stressed  importance of physical activity 5 times per week for 30 minutes per day.       3. Type 2 diabetes mellitus without complication, unspecified whether long term insulin use  Lab Results   Component Value Date    HGBA1C 7.1 (H) 12/18/2024    HGBA1C 6.6 06/15/2024    LDL 94.00 12/18/2024    CREATININE 0.67 12/18/2024      Not at goal.  Patient reports increased sugar intake due to holidays.  ADA diet advised.  Repeat A1c in 3 months.  Continue   Diabetes Medications               glipiZIDE (GLUCOTROL) 5 MG tablet Take 1 tablet (5 mg total) by mouth 2 (two) times daily before meals.        Follow ADA Diet. Avoid soda, simple sweets, and limit rice/pasta/breads/starches (no more than 45-50 grams per meal).  Maintain healthy weight with goal BMI <30.  Exercise 5 times per week for 30 minutes per day.  Stressed importance of daily foot exams.  Stressed importance of annual dilated eye exam.    4. Left hip pain  Continue alternating Tylenol and ibuprofen OTC as directed.  X-ray left hip ordered.  Will call with results and further plan of care.  - X-Ray Hip 2 or 3 views Left with Pelvis when performed; Future    5. Gastroesophageal reflux disease, unspecified whether esophagitis present  Controlled.  Refills sent for pantoprazole 40 mg p.o. daily.  - pantoprazole (PROTONIX) 40 MG tablet; Take 1 tablet (40 mg total) by mouth once daily.  Dispense: 30 tablet; Refill: 11       Follow up in about 3 months (around 3/23/2025) for DM. In addition to their scheduled follow up, the patient has also been instructed to follow up on as needed basis.     Future Appointments   Date Time Provider Department Center   3/24/2025  8:15 AM Radha Ponce FNP [facility]   6/19/2025  8:30 AM Radha Ponce FNP [facility]        BRUNO Darden

## 2024-12-24 DIAGNOSIS — E78.5 HYPERLIPIDEMIA, UNSPECIFIED HYPERLIPIDEMIA TYPE: Primary | ICD-10-CM

## 2024-12-24 RX ORDER — ROSUVASTATIN CALCIUM 10 MG/1
10 TABLET, COATED ORAL DAILY
Qty: 30 TABLET | Refills: 11 | Status: SHIPPED | OUTPATIENT
Start: 2024-12-24 | End: 2025-12-24

## 2024-12-26 ENCOUNTER — PATIENT OUTREACH (OUTPATIENT)
Facility: CLINIC | Age: 69
End: 2024-12-26
Payer: MEDICARE

## 2024-12-26 NOTE — PROGRESS NOTES
Health Maintenance Topic(s) Outreach Outcomes & Actions Taken:    Eye Exam - Outreach Outcomes & Actions Taken  : External Records Requested & Care Team Updated if Applicable and SEAN sent to [facility]    Diabetic Foot Exam - Outreach Outcomes & Actions Taken  : External Records Requested & Care Team Updated if Applicable and SEAN sent to Dr. Shae Fletcher       Additional Notes:

## 2024-12-26 NOTE — PROGRESS NOTES
Health Maintenance Topic(s) Outreach Outcomes & Actions Taken:    Eye Exam - Outreach Outcomes & Actions Taken  : Diabetic Eye External Records Uploaded, Care Team & History Updated if Applicable     Additional Notes:  DM Eye Exam 12/3/24

## 2024-12-26 NOTE — LETTER
AUTHORIZATION FOR RELEASE OF CONFIDENTIAL INFORMATION      2024      Dear Dr. Fletcher,    We are seeing Rhoda Cesar, date of birth 1955, in the clinic at [facility].  Radha Ponce FNP is the patient's PCP. Rhoda Cesar has an outstanding lab/procedure at the time we reviewed his chart.  In order to help keep her health information updated, Rhoda has authorized us to request the following medical record(s):            Foot Exam               Please fax any records to Radha Ponce FNP's at  [phone]        If you have any questions you can contact Vivienne Patton at [phone].             Patient Name: Rhoda Cesar  :1955  Patient Phone #:[phone]                                Rhoda Cesar  MRN: [id number]  : 1955  Age: 68 y.o.  Sex: female         Patient/Legal Guardian Signature  This signature was collected at 2024    Rhoda Cesar       _______________________________   Printed Name/Relationship to Patient      Consent for Examination and Treatment: I hereby authorize the providers and employees of [facility] ([facility]) to provide medical treatment/services which includes, but is not limited to, performing and administering tests and diagnostic procedures that are deemed necessary, including, but not limited to, imaging examinations, blood tests and other laboratory procedures as may be required by the hospital, clinic, or may be ordered by my physician(s) or persons working under the general and/or special instructions of my physician(s).      I understand and agree that this consent covers all authorized persons, including but not limited to physicians, residents, nurse practitioners, physicians' assistants, specialists, consultants, student nurses, and independently contracted physicians, who are called upon by the physician in charge, to carry out the diagnostic procedures and medical or surgical treatment.     I hereby  authorize [facility] to retain or dispose of any specimens or tissue, should there be such remaining from any test or procedure.     I hereby authorize and give consent for [facility] providers and employees to take photographs, images or videotapes of such diagnostic, surgical or treatment procedures of Patient as may be required by [facility] or as may be ordered by a physician. I further acknowledge and agree that [facility] may use cameras or other devices for patient monitoring.     I am aware that the practice of medicine is not an exact science, and I acknowledge that no guarantees have been made to me as to the outcome of any tests, procedures or treatment.     Authorization for Release of Information: I understand that my insurance company and/or their agents may need information necessary to make determinations about payment/reimbursement. I hereby provide authorization to release to all insurance companies, their successors, assignees, other parties with whom they may have contracted, or others acting on their behalf, that are involved with payment for any hospital and/or clinic charges incurred by the patient, any information that they request and deem necessary for payment/reimbursement, and/or quality review.  I further authorize the release of my health information to physicians or other health care practitioners on staff who are involved in my health care now and in the future, and to other health care providers, entities, or institutions for the purpose of my continued care and treatment, including referrals.     REGISTRATION AUTHORIZATION  Form No. 70490 (Rev. 7/13/2022)       Medicare Patient's Certification and Authorization to Release Information and Payment Request:  I certify that the information given by me in applying for payment under Title XVIII of the Social Security Act is correct. I authorize any high of medical or other information about me to release to the Social SecurityAdministration, or its  intermediaries or carriers, any information needed for this or a related Medicare claim. I request that payment of authorized benefits be made on my behalf.     Assignment of Insurance Benefits:   I hereby authorize any and all insurance companies, health plans, defined   benefit plans, health insurers or any entity that is or may be responsible for payment of my medical expenses to pay all hospital and medical benefits now due, and to become due and payable to me under any hospital benefits, sick benefits, injury benefits or any other benefit for services rendered to me, including Major Medical Benefits, direct to [facility] and all independently contracted physicians. I assign any and all rights that I may have against any and all insurance companies, health plans, defined benefit plans, health insurers or any entity that is or may be responsible for payment of my medical expenses, including, but not limited to any right to appeal a denial of a claim, any right to bring any action, lawsuit, administrative proceeding, or other cause of action on my behalf. I specifically assign my right to pursue litigation against any and all insurance companies, health plans, defined benefit plans, health insurers or any entity that is or may be responsible for payment of my medical expenses based upon a refusal to pay charges.            E. Valuables: It is understood and agreed that [facility] is not liable for the damage to or loss of any money, jewelry,   documents, dentures, eye glasses, hearing aids, prosthetics, or other property of value.     F. Computer Equipment: I understand and agree that should I choose to use computer equipment owned by [facility] or if I choose to access the Internet via [facility]s network, I do so at my own risk. [facility] is not responsible for any damage to my computer equipment or to any damages of any type that might arise from my loss of equipment or data.     G. Acceptance of Financial Responsibility:   I agree that in consideration of the services and   supplies that have been   or will be furnished to the patient, I am hereby obligated to pay all charges made for or on the account of the patient according to the standard rates (in effect at the time the services and supplies are delivered) established by [facility], including its Patient Financial Assistance Policy to the extent it is applicable. I understand that I am responsible for all charges, or portions thereof, not covered by insurance or other sources. Patient refunds will be distributed only after balances at all [facility] facilities are paid.     H. Communication Authorization:  I hereby authorize [facility] and its representatives, along with any billing service   or  who may work on their behalf, to contact me on   my cell phone and/or home phone using pre- recorded messages, artificial voice messages, automatic telephone dialing devices or other computer assisted technology, or by electronic      mail, text messaging, or by any other form of electronic communication. This includes, but is not limited to, appointment reminders, yearly physical exam reminders, preventive care reminders, patient campaigns, welcome calls, and calls about account balances on my account or any account on which I am listed as a guarantor. I understand I have the right to opt out of these communications at any time.      Relationship  Between  Facility and  Provider:      I understand that some, but not all, providers furnishing services to the patient are not employees or agents of [facility]. The patient is under the care and supervision of his/her attending physician, and it is the responsibility of the facility and its nursing staff to carry out the instructions of such physicians. It is the responsibility of the patient's physician/designee to obtain the patient's informed consent, when required, for medical or surgical treatment, special diagnostic or therapeutic  procedures, or hospital services rendered for the patient under the special instructions of the physician/designee.     REGISTRATION AUTHORIZATION  Form No. 32018 (Rev. 7/13/2022)      Notice of Privacy Practices: I acknowledge I have received a copy of [facility]'s Notice of Privacy Practices.     Facility  Directory: I have discussed with the organization my desire to be either included or excluded  in the facility directory in the event of my being an inpatient at an [facility] facility. I understand that if my choice is to opt-out of being identified in the facility directory that the facility will not provide any information about me such as my condition (e.g. fair, stable, etc.) or my location in the facility (e.g., room number, department).     Immunizations: [facility] shares immunization information with state sponsored health departments to help you and your doctor keep track of your immunization records. By signing, you consent to have this information shared with the health department in your state:                                [place] - LINKS ([place] Immunization Network for Kids Statewide)                                [place] - MIIX ([place] Immunization Information eXchange)                                [place] - ImmPRINT (Immunization Patient Registry with Integrated Technology)     TERM: This authorization is valid for this and subsequent care/treatment I receive at [facility] and will remain valid unless/until revoked in writing by me.     [facility]: As used in this document, [facility] means all [facility] owned and managed facilities, including, but not limited to, all health centers, surgery centers, clinics, urgent care centers, and hospitals.         [facility] complies with applicable Federal civil rights laws and does not discriminate on the basis of race, color, national origin, age, disability, or sex.  ATENCIÓN: si major henning a vidal disposición  servicios gratuitos de asistencia lingüística. Adrienne ortiz [phone].  GELY Ý: N?u b?n nói Ti?ng Vi?t, có các d?ch v? h? tr? ngôn ng? mi?n phí dành cho b?n. G?i s? [phone].        REGISTRATION AUTHORIZATION  Form No. 36773 (Rev. 7/13/2022)

## 2024-12-26 NOTE — LETTER
AUTHORIZATION FOR RELEASE OF CONFIDENTIAL INFORMATION      2024    Dear [facility],    We are seeing Rhoda Cesar, date of birth 1955, in the clinic at [facility].  Radha Ponce FNP is the patient's PCP.  Rhoda Cesar has an outstanding lab/procedure at the time we reviewed his chart.  In order to help keep her health information updated, Rhoda has authorized us to request the following medical record(s):    Eye Exam - including evaluation for diabetic retinopathy    Or please comment on the following:    DATE EYE EXAM: __________________________  Significant Findings:  ________ No Diabetic Retinopathy  ________ Minimal Background Diabetic Retinopathy  ________ Moderate to Severe Background Diabetic Retinopathy  ________ Clinically Significant Macular Edema  ________ Proliferative Diabetic Retinopathy     Further Testing / Treatment:  ________ No Treatment  ________ Intravenous Fluorescein Angiogram  ________ Laser: Focal / Pan Retinal (PRP)  ________ Other: ____________________________________     Signature of Examining Provider: _______________________    Printed name of provider: ____________________________________       Please fax any records to Radha Ponce FNP's at  [phone]    If you have any questions you can contact Vivienne Patton at [phone].       Patient Name: Rhoda Cesar  :1955  Patient Phone #: [phone]                Rhoda Cesar  MRN: [id number]  : 1955  Age: 68 y.o.  Sex: female         Patient/Legal Guardian Signature  This signature was collected at 2024    Rhoda Cesar       _______________________________   Printed Name/Relationship to Patient      Consent for Examination and Treatment: I hereby authorize the providers and employees of [facility] ([facility]) to provide medical treatment/services which includes, but is not limited to, performing and administering tests and diagnostic procedures that  are deemed necessary, including, but not limited to, imaging examinations, blood tests and other laboratory procedures as may be required by the hospital, clinic, or may be ordered by my physician(s) or persons working under the general and/or special instructions of my physician(s).      I understand and agree that this consent covers all authorized persons, including but not limited to physicians, residents, nurse practitioners, physicians' assistants, specialists, consultants, student nurses, and independently contracted physicians, who are called upon by the physician in charge, to carry out the diagnostic procedures and medical or surgical treatment.     I hereby authorize [facility] to retain or dispose of any specimens or tissue, should there be such remaining from any test or procedure.     I hereby authorize and give consent for [facility] providers and employees to take photographs, images or videotapes of such diagnostic, surgical or treatment procedures of Patient as may be required by [facility] or as may be ordered by a physician. I further acknowledge and agree that [facility] may use cameras or other devices for patient monitoring.     I am aware that the practice of medicine is not an exact science, and I acknowledge that no guarantees have been made to me as to the outcome of any tests, procedures or treatment.     Authorization for Release of Information: I understand that my insurance company and/or their agents may need information necessary to make determinations about payment/reimbursement. I hereby provide authorization to release to all insurance companies, their successors, assignees, other parties with whom they may have contracted, or others acting on their behalf, that are involved with payment for any hospital and/or clinic charges incurred by the patient, any information that they request and deem necessary for payment/reimbursement, and/or quality review.  I further authorize the release of my  health information to physicians or other health care practitioners on staff who are involved in my health care now and in the future, and to other health care providers, entities, or institutions for the purpose of my continued care and treatment, including referrals.     REGISTRATION AUTHORIZATION  Form No. 16951 (Rev. 7/13/2022)       Medicare Patient's Certification and Authorization to Release Information and Payment Request:  I certify that the information given by me in applying for payment under Title XVIII of the Social Security Act is correct. I authorize any high of medical or other information about me to release to the Social SecurityAdministration, or its intermediaries or carriers, any information needed for this or a related Medicare claim. I request that payment of authorized benefits be made on my behalf.     Assignment of Insurance Benefits:   I hereby authorize any and all insurance companies, health plans, defined   benefit plans, health insurers or any entity that is or may be responsible for payment of my medical expenses to pay all hospital and medical benefits now due, and to become due and payable to me under any hospital benefits, sick benefits, injury benefits or any other benefit for services rendered to me, including Major Medical Benefits, direct to [facility] and all independently contracted physicians. I assign any and all rights that I may have against any and all insurance companies, health plans, defined benefit plans, health insurers or any entity that is or may be responsible for payment of my medical expenses, including, but not limited to any right to appeal a denial of a claim, any right to bring any action, lawsuit, administrative proceeding, or other cause of action on my behalf. I specifically assign my right to pursue litigation against any and all insurance companies, health plans, defined benefit plans, health insurers or any entity that is or may be responsible for  payment of my medical expenses based upon a refusal to pay charges.            E. Valuables: It is understood and agreed that [facility] is not liable for the damage to or loss of any money, jewelry,   documents, dentures, eye glasses, hearing aids, prosthetics, or other property of value.     F. Computer Equipment: I understand and agree that should I choose to use computer equipment owned by [facility] or if I choose to access the Internet via [email or web address] network, I do so at my own risk. [facility] is not responsible for any damage to my computer equipment or to any damages of any type that might arise from my loss of equipment or data.     G. Acceptance of Financial Responsibility:  I agree that in consideration of the services and   supplies that have been   or will be furnished to the patient, I am hereby obligated to pay all charges made for or on the account of the patient according to the standard rates (in effect at the time the services and supplies are delivered) established by [facility], including its Patient Financial Assistance Policy to the extent it is applicable. I understand that I am responsible for all charges, or portions thereof, not covered by insurance or other sources. Patient refunds will be distributed only after balances at all [facility] facilities are paid.     H. Communication Authorization:  I hereby authorize [facility] and its representatives, along with any billing service   or  who may work on their behalf, to contact me on   my cell phone and/or home phone using pre- recorded messages, artificial voice messages, automatic telephone dialing devices or other computer assisted technology, or by electronic      mail, text messaging, or by any other form of electronic communication. This includes, but is not limited to, appointment reminders, yearly physical exam reminders, preventive care reminders, patient campaigns, welcome calls, and calls about account balances on my account or any  account on which I am listed as a guarantor. I understand I have the right to opt out of these communications at any time.      Relationship  Between  Facility and  Provider:      I understand that some, but not all, providers furnishing services to the patient are not employees or agents of [facility]. The patient is under the care and supervision of his/her attending physician, and it is the responsibility of the facility and its nursing staff to carry out the instructions of such physicians. It is the responsibility of the patient's physician/designee to obtain the patient's informed consent, when required, for medical or surgical treatment, special diagnostic or therapeutic procedures, or hospital services rendered for the patient under the special instructions of the physician/designee.     REGISTRATION AUTHORIZATION  Form No. 22464 (Rev. 7/13/2022)      Notice of Privacy Practices: I acknowledge I have received a copy of [facility]'s Notice of Privacy Practices.     Facility  Directory: I have discussed with the organization my desire to be either included or excluded  in the facility directory in the event of my being an inpatient at an [facility] facility. I understand that if my choice is to opt-out of being identified in the facility directory that the facility will not provide any information about me such as my condition (e.g. fair, stable, etc.) or my location in the facility (e.g., room number, department).     Immunizations: [facility] shares immunization information with state sponsored health departments to help you and your doctor keep track of your immunization records. By signing, you consent to have this information shared with the health department in your state:                                [place] - LINKS ([place] Immunization Network for Kids Statewide)                                [place] - MIIX ([place] Immunization Information eXchange)                                [place] -  ImmPRINT (Immunization Patient Registry with Integrated Technology)     TERM: This authorization is valid for this and subsequent care/treatment I receive at [facility] and will remain valid unless/until revoked in writing by me.     [facility]: As used in this document, [facility] means all [facility] owned and managed facilities, including, but not limited to, all health centers, surgery centers, clinics, urgent care centers, and hospitals.         [facility] complies with applicable Federal civil rights laws and does not discriminate on the basis of race, color, national origin, age, disability, or sex.  ATENCIÓN: si habla español, tiene a vidal disposición servicios gratuitos de asistencia lingüística. Adrienne al [phone].  CHÚ Ý: N?u b?n nói Ti?ng Vi?t, có các d?ch v? h? tr? ngôn ng? mi?n phí dành cho b?n. G?i s? [phone].        REGISTRATION AUTHORIZATION  Form No. 96092 (Rev. 7/13/2022)

## 2025-03-05 DIAGNOSIS — J30.2 SEASONAL ALLERGIES: Primary | ICD-10-CM

## 2025-03-05 RX ORDER — LEVOCETIRIZINE DIHYDROCHLORIDE 5 MG/1
5 TABLET, FILM COATED ORAL NIGHTLY
Qty: 30 TABLET | Refills: 11 | Status: SHIPPED | OUTPATIENT
Start: 2025-03-05 | End: 2026-03-05

## 2025-03-13 ENCOUNTER — TELEPHONE (OUTPATIENT)
Dept: FAMILY MEDICINE | Facility: CLINIC | Age: 70
End: 2025-03-13
Payer: MEDICARE

## 2025-03-13 NOTE — TELEPHONE ENCOUNTER
----- Message from Tisha sent at 3/13/2025  2:00 PM CDT -----  Who Called: Rhoda TheodoretPatijacqueline is returning phone callWho Left Message for Patient: ??Does the patient know what this is regarding?: maybe her upcoming apptPreferred Method of Contact: Phone CallPatient's Preferred Phone Number on File: [phone] Best Call Back Number, if different: n/aAdditional Information: n/a

## 2025-03-17 ENCOUNTER — RESULTS FOLLOW-UP (OUTPATIENT)
Dept: FAMILY MEDICINE | Facility: CLINIC | Age: 70
End: 2025-03-17

## 2025-03-17 ENCOUNTER — LAB VISIT (OUTPATIENT)
Dept: LAB | Facility: HOSPITAL | Age: 70
End: 2025-03-17
Attending: NURSE PRACTITIONER
Payer: MEDICARE

## 2025-03-17 DIAGNOSIS — E11.9 TYPE 2 DIABETES MELLITUS WITHOUT COMPLICATION, UNSPECIFIED WHETHER LONG TERM INSULIN USE: ICD-10-CM

## 2025-03-17 LAB
EST. AVERAGE GLUCOSE BLD GHB EST-MCNC: 165.7 MG/DL
HBA1C MFR BLD: 7.4 %

## 2025-03-17 PROCEDURE — 36415 COLL VENOUS BLD VENIPUNCTURE: CPT

## 2025-03-17 PROCEDURE — 83036 HEMOGLOBIN GLYCOSYLATED A1C: CPT

## 2025-03-24 ENCOUNTER — OFFICE VISIT (OUTPATIENT)
Dept: FAMILY MEDICINE | Facility: CLINIC | Age: 70
End: 2025-03-24
Payer: MEDICARE

## 2025-03-24 VITALS
HEART RATE: 62 BPM | RESPIRATION RATE: 18 BRPM | BODY MASS INDEX: 30.84 KG/M2 | TEMPERATURE: 98 F | OXYGEN SATURATION: 96 % | SYSTOLIC BLOOD PRESSURE: 121 MMHG | WEIGHT: 191.88 LBS | DIASTOLIC BLOOD PRESSURE: 76 MMHG | HEIGHT: 66 IN

## 2025-03-24 DIAGNOSIS — R00.2 PALPITATION: ICD-10-CM

## 2025-03-24 DIAGNOSIS — R00.2 FLUTTERING SENSATION OF HEART: ICD-10-CM

## 2025-03-24 DIAGNOSIS — E03.9 HYPOTHYROIDISM, UNSPECIFIED TYPE: ICD-10-CM

## 2025-03-24 DIAGNOSIS — R06.02 SOB (SHORTNESS OF BREATH): Primary | ICD-10-CM

## 2025-03-24 DIAGNOSIS — J81.0 ACUTE PULMONARY EDEMA: ICD-10-CM

## 2025-03-24 DIAGNOSIS — E11.9 TYPE 2 DIABETES MELLITUS WITHOUT COMPLICATION, UNSPECIFIED WHETHER LONG TERM INSULIN USE: ICD-10-CM

## 2025-03-24 DIAGNOSIS — Z12.31 ENCOUNTER FOR SCREENING MAMMOGRAM FOR BREAST CANCER: ICD-10-CM

## 2025-03-24 PROCEDURE — 4010F ACE/ARB THERAPY RXD/TAKEN: CPT | Mod: ,,, | Performed by: NURSE PRACTITIONER

## 2025-03-24 PROCEDURE — 3078F DIAST BP <80 MM HG: CPT | Mod: ,,, | Performed by: NURSE PRACTITIONER

## 2025-03-24 PROCEDURE — G2211 COMPLEX E/M VISIT ADD ON: HCPCS | Mod: ,,, | Performed by: NURSE PRACTITIONER

## 2025-03-24 PROCEDURE — 3008F BODY MASS INDEX DOCD: CPT | Mod: ,,, | Performed by: NURSE PRACTITIONER

## 2025-03-24 PROCEDURE — 1125F AMNT PAIN NOTED PAIN PRSNT: CPT | Mod: ,,, | Performed by: NURSE PRACTITIONER

## 2025-03-24 PROCEDURE — 1100F PTFALLS ASSESS-DOCD GE2>/YR: CPT | Mod: ,,, | Performed by: NURSE PRACTITIONER

## 2025-03-24 PROCEDURE — 3074F SYST BP LT 130 MM HG: CPT | Mod: ,,, | Performed by: NURSE PRACTITIONER

## 2025-03-24 PROCEDURE — 3051F HG A1C>EQUAL 7.0%<8.0%: CPT | Mod: ,,, | Performed by: NURSE PRACTITIONER

## 2025-03-24 PROCEDURE — 3288F FALL RISK ASSESSMENT DOCD: CPT | Mod: ,,, | Performed by: NURSE PRACTITIONER

## 2025-03-24 PROCEDURE — 99214 OFFICE O/P EST MOD 30 MIN: CPT | Mod: ,,, | Performed by: NURSE PRACTITIONER

## 2025-03-24 RX ORDER — GABAPENTIN 600 MG/1
600 TABLET ORAL 3 TIMES DAILY
COMMUNITY
Start: 2025-02-27

## 2025-03-25 ENCOUNTER — HOSPITAL ENCOUNTER (OUTPATIENT)
Dept: RADIOLOGY | Facility: HOSPITAL | Age: 70
Discharge: HOME | End: 2025-03-25
Attending: NURSE PRACTITIONER
Payer: MEDICARE

## 2025-03-25 ENCOUNTER — HOSPITAL ENCOUNTER (OUTPATIENT)
Dept: CARDIOLOGY | Facility: HOSPITAL | Age: 70
Discharge: HOME | End: 2025-03-25
Attending: NURSE PRACTITIONER
Payer: MEDICARE

## 2025-03-25 DIAGNOSIS — R06.02 SOB (SHORTNESS OF BREATH): ICD-10-CM

## 2025-03-25 DIAGNOSIS — R00.2 FLUTTERING SENSATION OF HEART: ICD-10-CM

## 2025-03-25 DIAGNOSIS — R00.2 PALPITATION: ICD-10-CM

## 2025-03-25 PROCEDURE — 93005 ELECTROCARDIOGRAM TRACING: CPT

## 2025-03-25 PROCEDURE — 71046 X-RAY EXAM CHEST 2 VIEWS: CPT | Mod: TC

## 2025-03-25 RX ORDER — FUROSEMIDE 40 MG/1
40 TABLET ORAL 2 TIMES DAILY
Qty: 60 TABLET | Refills: 3 | Status: SHIPPED | OUTPATIENT
Start: 2025-03-25 | End: 2026-03-25

## 2025-03-25 RX ORDER — POTASSIUM CHLORIDE 20 MEQ/1
20 TABLET, EXTENDED RELEASE ORAL DAILY
Qty: 30 TABLET | Refills: 6 | Status: SHIPPED | OUTPATIENT
Start: 2025-03-25

## 2025-03-25 NOTE — PROGRESS NOTES
Patient ID: [id number]      Chief Complaint:  Three-month follow-up        HPI: Rhoda Cesar is a 69 y.o. female here today for a three-month follow-up for diabetes.  She presents with complaints of occasional palpitations and shortness for breath with exertion.  Patient also reports occasional chest pain that resolves within seconds spontaneously.  Patient reports family history of cardiac disease.  Previously seen by Cardiology over 20 years ago and failed a stress test.  She is unsure of any follow-up since then.                Health Maintenance   Topic Date Due    RSV Vaccine (Age 60+ and Pregnant patients) (1 - Risk 60-74 years 1-dose series) Never done    COVID-19 Vaccine (4 - 2024-25 season) 09/01/2024    Mammogram  06/19/2025    Diabetes Urine Screening  06/15/2025    Foot Exam  07/16/2025    Hemoglobin A1c  09/17/2025    Diabetic Eye Exam  12/03/2025    Lipid Panel  12/18/2025    Low Dose Statin  12/24/2025    TETANUS VACCINE  10/11/2026    Colorectal Cancer Screening  05/04/2027    DEXA Scan  06/17/2027    Hepatitis C Screening  Completed    Shingles Vaccine  Completed    Influenza Vaccine  Completed    Pneumococcal Vaccines (Age 50+)  Completed         Past Medical History:  has a past medical history of Arthritis, Diabetes mellitus, Digestive disorder, Hyperlipidemia, Hypertension, and Thyroid disease.     Surgical History:  has a past surgical history that includes Cholecystectomy; Knee arthroscopy; Tubal ligation; Appendectomy; Colonoscopy; and robotic arthroplasty, knee (Right, 1/3/2023).     Family History: family history includes Arrhythmia in her brother; Arthritis in her daughter and mother; Brain cancer in her brother; Cancer in her mother; Cervical cancer in her sister; Deep vein thrombosis in her sister; Diabetes in her mother and sister; Heart attack in her father; Heart failure in her brother and mother; Peripheral vascular disease in her father; Stroke in her brother, father, mother,  and sister; Ulcerative colitis in her sister; Ulcers in her father.     Social History:  reports that she has never smoked. She has never used smokeless tobacco. She reports that she does not currently use alcohol. She reports that she does not currently use drugs.          Current Outpatient Medications   Medication Instructions    acetaminophen (TYLENOL) 650 mg, 2 times daily    alendronate (FOSAMAX) 70 mg, Oral, Every 7 days    ascorbic acid (vitamin C) (VITAMIN C) 500 mg, Daily    aspirin (ECOTRIN) 81 mg, Oral, Every 12 hours    blood sugar diagnostic (ONETOUCH ULTRA TEST) Strp 1 strip, Other, Daily    blood-glucose meter (ONETOUCH ULTRA2 METER) Misc USE TO CHECK BLOOD GLUCOSE LEVELS ONCE DAILY    furosemide (LASIX) 40 mg, Oral, 2 times daily    gabapentin (NEURONTIN) 600 mg, Oral, 2 times daily    gabapentin (NEURONTIN) 600 mg, 3 times daily    glipiZIDE (GLUCOTROL) 5 mg, Oral, 2 times daily before meals    ibuprofen (ADVIL,MOTRIN) 800 mg, Oral, Every 8 hours PRN    levocetirizine (XYZAL) 5 mg, Oral, Nightly    lisinopriL 10 mg, Oral, Daily    minocycline (MINOCIN,DYNACIN) 100 mg, 2 times daily    montelukast (SINGULAIR) 10 mg, Daily    multivitamin (THERAGRAN) per tablet 1 tablet, Daily    ONETOUCH DELICA PLUS LANCET 33 gauge Misc USE DAILY AND AS NEEDED FOR BLOOD GLUCOSE TESTING    pantoprazole (PROTONIX) 40 mg, Oral, Daily    potassium chloride SA (K-DUR,KLOR-CON) 20 MEQ tablet 20 mEq, Oral, Daily    rosuvastatin (CRESTOR) 10 mg, Oral, Daily    SYNTHROID 150 mcg, Oral, Daily    vitamin E 200 Units, Daily    zinc gluconate 50 mg, Daily         Patient is allergic to sulfa (sulfonamide antibiotics) and codeine.      Patient Care Team:  Radha Ponce FNP as PCP - General (Nurse Practitioner)  Cece Rojas LPN as Care Coordinator  #[id number], [facility]         Subjective:      Review of Systems     12 point review of systems conducted, negative except as stated in the history of present illness.  "See HPI for details.        Objective:      Visit Vitals  /76 (BP Location: Right arm, Patient Position: Sitting)   Pulse 62   Temp 98.1 °F (36.7 °C) (Oral)   Resp 18   Ht 5' 6" (1.676 m)   Wt 87 kg (191 lb 14.4 oz)   SpO2 96%   BMI 30.97 kg/m²         Physical Exam     General: Alert and oriented, No acute distress.  Head: Normocephalic, Atraumatic.  Eye: Pupils are equal, round and reactive to light, Extraocular movements are intact, Sclera non-icteric.  Ears/Nose/Throat: Normal, Mucosa moist,Clear.  Neck/Thyroid: Supple, Non-tender, No carotid bruit, No lymphadenopathy, No JVD, Full range of motion.  Respiratory: Clear to auscultation bilaterally; No wheezes, rales or rhonchi,Non-labored respirations, Symmetrical chest wall expansion.  Cardiovascular: Regular rate and rhythm, S1/S2 normal, No murmurs, rubs or gallops.  Gastrointestinal: Soft, Non-tender, Non-distended, Normal bowel sounds, No palpable organomegaly.  Musculoskeletal: Normal range of motion.  Integumentary: Warm, Dry, Intact, No suspicious lesions or rashes.  Extremities: No clubbing, cyanosis or edema  Neurologic: No focal deficits, Cranial Nerves II-XII are grossly intact, Motor strength normal upper and lower extremities, Sensory exam intact.  Psychiatric: Normal interaction, Coherent speech, Euthymic mood, Appropriate affect      Labs Reviewed:      Chemistry:        Lab Results   Component Value Date      12/18/2024     K 4.9 12/18/2024     BUN 13.4 12/18/2024     CREATININE 0.67 12/18/2024     EGFRNORACEVR >60 12/18/2024     GLUCOSE 180 (H) 12/18/2024     CALCIUM 9.5 12/18/2024     ALKPHOS 68 12/18/2024     LABPROT 6.9 12/18/2024     ALBUMIN 4.0 12/18/2024     BILIDIR 0.4 06/14/2021     IBILI 0.60 06/14/2021     AST 23 12/18/2024     ALT 31 12/18/2024     MG 2.0 09/01/2019     [id number] 42 06/15/2024     TSH 1.975 03/25/2025     [id number] 1.30 01/04/2020               Lab Results   Component Value Date     HGBA1C 7.4 (H) " 03/17/2025         Hematology:        Lab Results   Component Value Date     WBC 6.62 06/15/2024     HGB 13.7 06/15/2024     HCT 43.7 06/15/2024      06/15/2024         Lipid Panel:        Lab Results   Component Value Date     CHOL 156 12/18/2024     HDL 43 12/18/2024     LDL 94.00 12/18/2024     TRIG 95 12/18/2024     TOTALCHOLEST 4 12/18/2024         Urine:        Lab Results   Component Value Date     APPEARANCEUA Hazy (A) 12/12/2022     SGUA 1.020 12/12/2022     PROTEINUA Negative 12/12/2022     KETONESUA Negative 12/12/2022     LEUKOCYTESUR Small (A) 12/12/2022     RBCUA None Seen 12/12/2022     WBCUA 21-50 (A) 12/12/2022     BACTERIA Few (A) 12/12/2022     SQEPUA  (A) 09/24/2019     HYALINECASTS 0 09/24/2019     CREATRANDUR 129.5 (H) 06/15/2024            Assessment/Plan      Assessment & Plan  SOB (shortness of breath)  ED precautions given.  CXR ordered.  Referral sent to Cardiology.  Orders:    BNP; Future    X-Ray Chest PA And Lateral; Future     Acute pulmonary edema  ED precautions given.  EKG ordered.  Referral sent to Cardiology.  Orders:    X-Ray Chest PA And Lateral; Future        X-ray shows mild pulmonary edema.  Lasix 40 mg p.o. b.i.d. sent to pharmacy.  Strict ED precautions.  Await appointment with Cardiology.    furosemide (LASIX) 40 MG tablet; Take 1 tablet (40 mg total) by mouth 2 (two) times a day.     Palpitation  ED precautions given.  EKG ordered.  Referral sent to Cardiology.  Orders:    Ambulatory referral/consult to Cardiology; Future    SCHEDULED EKG 12-LEAD (to Muse); Future     Fluttering sensation of heart  ED precautions given.  EKG ordered.  Referral sent to Cardiology.  Orders:    Ambulatory referral/consult to Cardiology; Future    SCHEDULED EKG 12-LEAD (to Muse); Future     Type 2 diabetes mellitus without complication, unspecified whether long term insulin use        Lab Results   Component Value Date     HGBA1C 7.4 (H) 03/17/2025     HGBA1C 7.1 (H) 12/18/2024      LDL 94.00 12/18/2024     CREATININE 0.67 12/18/2024      Not at goal.  Patient will comply with ADA diet more strictly.  Continue current management.  Repeat A1c in 3 months.  Follow ADA Diet. Avoid soda, simple sweets, and limit rice/pasta/breads/starches (no more than 45-50 grams per meal).  Maintain healthy weight with goal BMI <30.  Exercise 5 times per week for 30 minutes per day.  Stressed importance of daily foot exams.  Stressed importance of annual dilated eye exam.     Orders:    Ambulatory referral/consult to Diabetes Education; Future     Hypothyroidism, unspecified type  Orders:    TSH; Future     Encounter for screening mammogram for breast cancer  Orders:    Mammo Digital Screening Bilat w/ Bordy (XPD); Future           Follow up in about 3 months (around 6/24/2025) for Medicare Wellness. In addition to their scheduled follow up, the patient has also been instructed to follow up on as needed basis.             Future Appointments   Date Time Provider Department Center   6/10/2025  1:30 PM Radha Ponce FNP [facility]   6/19/2025  8:30 AM Radha Ponce FNP [facility]         BRUNO Darden

## 2025-03-26 ENCOUNTER — RESULTS FOLLOW-UP (OUTPATIENT)
Dept: FAMILY MEDICINE | Facility: CLINIC | Age: 70
End: 2025-03-26

## 2025-03-27 LAB
OHS QRS DURATION: 100 MS
OHS QTC CALCULATION: 437 MS

## 2025-04-03 DIAGNOSIS — Z76.0 MEDICATION REFILL: ICD-10-CM

## 2025-04-03 RX ORDER — IBUPROFEN 800 MG/1
800 TABLET ORAL EVERY 8 HOURS PRN
Qty: 90 TABLET | Refills: 3 | Status: SHIPPED | OUTPATIENT
Start: 2025-04-03

## 2025-04-08 DIAGNOSIS — E11.9 TYPE 2 DIABETES MELLITUS WITHOUT COMPLICATION, UNSPECIFIED WHETHER LONG TERM INSULIN USE: ICD-10-CM

## 2025-04-08 RX ORDER — GLIPIZIDE 5 MG/1
5 TABLET ORAL
Qty: 60 TABLET | Refills: 11 | Status: SHIPPED | OUTPATIENT
Start: 2025-04-08 | End: 2026-04-08

## 2025-04-15 DIAGNOSIS — I10 HYPERTENSION, UNSPECIFIED TYPE: ICD-10-CM

## 2025-04-15 RX ORDER — LISINOPRIL 10 MG/1
10 TABLET ORAL DAILY
Qty: 90 TABLET | Refills: 3 | Status: SHIPPED | OUTPATIENT
Start: 2025-04-15

## 2025-05-06 DIAGNOSIS — E03.9 HYPOTHYROIDISM, UNSPECIFIED TYPE: ICD-10-CM

## 2025-05-06 RX ORDER — LEVOTHYROXINE SODIUM 150 UG/1
150 TABLET ORAL DAILY
Qty: 30 TABLET | Refills: 11 | Status: SHIPPED | OUTPATIENT
Start: 2025-05-06

## 2025-05-23 DIAGNOSIS — M85.851 OSTEOPENIA OF RIGHT HIP: ICD-10-CM

## 2025-05-23 RX ORDER — ALENDRONATE SODIUM 70 MG/1
70 TABLET ORAL
Qty: 4 TABLET | Refills: 11 | Status: SHIPPED | OUTPATIENT
Start: 2025-05-23 | End: 2026-05-23

## 2025-06-03 ENCOUNTER — TELEPHONE (OUTPATIENT)
Dept: FAMILY MEDICINE | Facility: CLINIC | Age: 70
End: 2025-06-03
Payer: MEDICARE

## 2025-06-03 DIAGNOSIS — Z00.00 WELLNESS EXAMINATION: Primary | ICD-10-CM

## 2025-06-09 ENCOUNTER — HOSPITAL ENCOUNTER (OUTPATIENT)
Facility: HOSPITAL | Age: 70
Discharge: HOME | End: 2025-06-09
Attending: INTERNAL MEDICINE | Admitting: INTERNAL MEDICINE
Payer: MEDICARE

## 2025-06-09 DIAGNOSIS — I20.89 STABLE ANGINA: ICD-10-CM

## 2025-06-09 LAB
CATH EF QUANTITATIVE: 60 %
OHS QRS DURATION: 96 MS
OHS QTC CALCULATION: 438 MS
POCT GLUCOSE: 190 MG/DL (ref 70–110)

## 2025-06-09 PROCEDURE — C1769 GUIDE WIRE: HCPCS | Performed by: INTERNAL MEDICINE

## 2025-06-09 PROCEDURE — 99152 MOD SED SAME PHYS/QHP 5/>YRS: CPT | Performed by: INTERNAL MEDICINE

## 2025-06-09 PROCEDURE — 25000003 PHARM REV CODE 250: Performed by: INTERNAL MEDICINE

## 2025-06-09 PROCEDURE — C1887 CATHETER, GUIDING: HCPCS | Performed by: INTERNAL MEDICINE

## 2025-06-09 PROCEDURE — 93005 ELECTROCARDIOGRAM TRACING: CPT

## 2025-06-09 PROCEDURE — C1894 INTRO/SHEATH, NON-LASER: HCPCS | Performed by: INTERNAL MEDICINE

## 2025-06-09 PROCEDURE — 25500020 PHARM REV CODE 255: Performed by: INTERNAL MEDICINE

## 2025-06-09 PROCEDURE — 63600175 PHARM REV CODE 636 W HCPCS: Performed by: INTERNAL MEDICINE

## 2025-06-09 PROCEDURE — 93010 ELECTROCARDIOGRAM REPORT: CPT | Mod: ,,, | Performed by: STUDENT IN AN ORGANIZED HEALTH CARE EDUCATION/TRAINING PROGRAM

## 2025-06-09 PROCEDURE — 93458 L HRT ARTERY/VENTRICLE ANGIO: CPT | Performed by: INTERNAL MEDICINE

## 2025-06-09 RX ORDER — MIDAZOLAM HYDROCHLORIDE 1 MG/ML
INJECTION INTRAMUSCULAR; INTRAVENOUS
Status: DISCONTINUED | OUTPATIENT
Start: 2025-06-09 | End: 2025-06-09 | Stop reason: HOSPADM

## 2025-06-09 RX ORDER — ATROPINE SULFATE 0.1 MG/ML
INJECTION INTRAVENOUS
Status: DISCONTINUED
Start: 2025-06-09 | End: 2025-06-09 | Stop reason: WASHOUT

## 2025-06-09 RX ORDER — IOPAMIDOL 755 MG/ML
INJECTION, SOLUTION INTRAVASCULAR
Status: DISCONTINUED | OUTPATIENT
Start: 2025-06-09 | End: 2025-06-09 | Stop reason: HOSPADM

## 2025-06-09 RX ORDER — LIDOCAINE HYDROCHLORIDE 10 MG/ML
INJECTION, SOLUTION INFILTRATION; PERINEURAL
Status: DISCONTINUED | OUTPATIENT
Start: 2025-06-09 | End: 2025-06-09 | Stop reason: HOSPADM

## 2025-06-09 RX ORDER — DIAZEPAM 5 MG/1
10 TABLET ORAL
Status: DISCONTINUED | OUTPATIENT
Start: 2025-06-09 | End: 2025-06-09 | Stop reason: HOSPADM

## 2025-06-09 RX ORDER — NITROGLYCERIN 20 MG/100ML
INJECTION INTRAVENOUS
Status: DISCONTINUED | OUTPATIENT
Start: 2025-06-09 | End: 2025-06-09 | Stop reason: HOSPADM

## 2025-06-09 RX ORDER — DIPHENHYDRAMINE HCL 25 MG
50 CAPSULE ORAL
Status: DISCONTINUED | OUTPATIENT
Start: 2025-06-09 | End: 2025-06-09 | Stop reason: HOSPADM

## 2025-06-09 RX ORDER — SODIUM CHLORIDE 9 MG/ML
INJECTION, SOLUTION INTRAVENOUS CONTINUOUS
Status: ACTIVE | OUTPATIENT
Start: 2025-06-09 | End: 2025-06-09

## 2025-06-09 RX ORDER — FENTANYL CITRATE 50 UG/ML
INJECTION, SOLUTION INTRAMUSCULAR; INTRAVENOUS
Status: DISCONTINUED | OUTPATIENT
Start: 2025-06-09 | End: 2025-06-09 | Stop reason: HOSPADM

## 2025-06-09 RX ADMIN — DIAZEPAM 10 MG: 5 TABLET ORAL at 05:06

## 2025-06-09 RX ADMIN — DIPHENHYDRAMINE HYDROCHLORIDE 50 MG: 25 CAPSULE ORAL at 05:06

## 2025-06-09 NOTE — Clinical Note
The catheter was inserted into the, was removed from the and was inserted over the wire into the left ventricle. Hemodynamics were performed.  and Pullback was recorded.  An angiography was performed of the LV.  60%. EDP 21

## 2025-06-09 NOTE — Clinical Note
Manual pressure was applied to the right radial artery and right radial artery sheath insertion site.

## 2025-06-09 NOTE — DISCHARGE INSTRUCTIONS
?? Post-Procedure Care Instructions  Please follow the guidelines below to ensure a smooth recovery after your procedure. If you have any questions, contact your healthcare provider.    1. Dressing Care  Remove the dressing after 24 hours unless instructed otherwise.  Keep the area clean and dry.    2. Driving  Do not drive for 2 days following your procedure.  Arrange for transportation as needed.    3. Activity Restrictions  Lifting: Avoid lifting anything heavier than a gallon of milk (approximately 8 lbs or 3.5 kg) for 5 days.  Submersion Precautions: If your access site was in the groin, Do not submerge the access site in water (e.g., no baths, hot tubs, or swimming) for 5 days. Showers are acceptable unless otherwise directed.    4. Skin Care  Do not apply lotions, creams, or powders near the access site for 5 days.    5. Warning Signs - Seek Medical Attention Immediately  Go to the nearest emergency room or contact your healthcare provider if you experience:  Fever  Redness, warmth, or swelling around the access site  Discharge or drainage from the site    6. In Case of Bleeding  If the insertion site begins to bleed:  Lie flat on your back.  Apply firm pressure directly to the site.  Call 911 immediately.

## 2025-06-13 ENCOUNTER — RESULTS FOLLOW-UP (OUTPATIENT)
Dept: FAMILY MEDICINE | Facility: CLINIC | Age: 70
End: 2025-06-13

## 2025-06-13 ENCOUNTER — LAB VISIT (OUTPATIENT)
Dept: LAB | Facility: HOSPITAL | Age: 70
End: 2025-06-13
Attending: NURSE PRACTITIONER
Payer: MEDICARE

## 2025-06-13 VITALS
WEIGHT: 188 LBS | OXYGEN SATURATION: 99 % | SYSTOLIC BLOOD PRESSURE: 113 MMHG | HEIGHT: 66 IN | RESPIRATION RATE: 20 BRPM | BODY MASS INDEX: 30.22 KG/M2 | TEMPERATURE: 98 F | DIASTOLIC BLOOD PRESSURE: 67 MMHG | HEART RATE: 57 BPM

## 2025-06-13 DIAGNOSIS — Z00.00 WELLNESS EXAMINATION: ICD-10-CM

## 2025-06-13 LAB
25(OH)D3+25(OH)D2 SERPL-MCNC: 47 NG/ML (ref 30–80)
ALBUMIN SERPL-MCNC: 4 G/DL (ref 3.4–4.8)
ALBUMIN/GLOB SERPL: 1.2 RATIO (ref 1.1–2)
ALP SERPL-CCNC: 75 UNIT/L (ref 40–150)
ALT SERPL-CCNC: 27 UNIT/L (ref 0–55)
ANION GAP SERPL CALC-SCNC: 10 MEQ/L
AST SERPL-CCNC: 21 UNIT/L (ref 11–45)
BACTERIA #/AREA URNS AUTO: ABNORMAL /HPF
BASOPHILS # BLD AUTO: 0.02 X10(3)/MCL
BASOPHILS NFR BLD AUTO: 0.3 %
BILIRUB SERPL-MCNC: 0.7 MG/DL
BILIRUB UR QL STRIP.AUTO: NEGATIVE
BUN SERPL-MCNC: 18.8 MG/DL (ref 9.8–20.1)
CALCIUM SERPL-MCNC: 9.4 MG/DL (ref 8.4–10.2)
CHLORIDE SERPL-SCNC: 104 MMOL/L (ref 98–107)
CHOLEST SERPL-MCNC: 161 MG/DL
CHOLEST/HDLC SERPL: 4 {RATIO} (ref 0–5)
CLARITY UR: CLEAR
CO2 SERPL-SCNC: 28 MMOL/L (ref 23–31)
COLOR UR AUTO: ABNORMAL
CREAT SERPL-MCNC: 0.81 MG/DL (ref 0.55–1.02)
CREAT UR-MCNC: 94.1 MG/DL (ref 45–106)
CREAT/UREA NIT SERPL: 23
EOSINOPHIL # BLD AUTO: 0.19 X10(3)/MCL (ref 0–0.9)
EOSINOPHIL NFR BLD AUTO: 2.6 %
ERYTHROCYTE [DISTWIDTH] IN BLOOD BY AUTOMATED COUNT: 12.6 % (ref 11.5–17)
EST. AVERAGE GLUCOSE BLD GHB EST-MCNC: 180 MG/DL
GFR SERPLBLD CREATININE-BSD FMLA CKD-EPI: >60 ML/MIN/1.73/M2
GLOBULIN SER-MCNC: 3.4 GM/DL (ref 2.4–3.5)
GLUCOSE SERPL-MCNC: 180 MG/DL (ref 82–115)
GLUCOSE UR QL STRIP: NEGATIVE
HBA1C MFR BLD: 7.9 %
HCT VFR BLD AUTO: 42 % (ref 37–47)
HDLC SERPL-MCNC: 39 MG/DL (ref 35–60)
HGB BLD-MCNC: 13.7 G/DL (ref 12–16)
HGB UR QL STRIP: NEGATIVE
IMM GRANULOCYTES # BLD AUTO: 0.01 X10(3)/MCL (ref 0–0.04)
IMM GRANULOCYTES NFR BLD AUTO: 0.1 %
KETONES UR QL STRIP: NEGATIVE
LDLC SERPL CALC-MCNC: 81 MG/DL (ref 50–140)
LEUKOCYTE ESTERASE UR QL STRIP: ABNORMAL
LYMPHOCYTES # BLD AUTO: 1.46 X10(3)/MCL (ref 0.6–4.6)
LYMPHOCYTES NFR BLD AUTO: 20.2 %
MCH RBC QN AUTO: 29.8 PG (ref 27–31)
MCHC RBC AUTO-ENTMCNC: 32.6 G/DL (ref 33–36)
MCV RBC AUTO: 91.5 FL (ref 80–94)
MICROALBUMIN UR-MCNC: 11.5 UG/ML
MICROALBUMIN/CREAT RATIO PNL UR: 12.2 MG/GM CR (ref 0–30)
MONOCYTES # BLD AUTO: 0.65 X10(3)/MCL (ref 0.1–1.3)
MONOCYTES NFR BLD AUTO: 9 %
NEUTROPHILS # BLD AUTO: 4.88 X10(3)/MCL (ref 2.1–9.2)
NEUTROPHILS NFR BLD AUTO: 67.8 %
NITRITE UR QL STRIP: NEGATIVE
PH UR STRIP: 6 [PH]
PLATELET # BLD AUTO: 184 X10(3)/MCL (ref 130–400)
PMV BLD AUTO: 9.9 FL (ref 7.4–10.4)
POTASSIUM SERPL-SCNC: 5.4 MMOL/L (ref 3.5–5.1)
PROT SERPL-MCNC: 7.4 GM/DL (ref 5.8–7.6)
PROT UR QL STRIP: NEGATIVE
RBC # BLD AUTO: 4.59 X10(6)/MCL (ref 4.2–5.4)
RBC #/AREA URNS AUTO: ABNORMAL /HPF
SODIUM SERPL-SCNC: 142 MMOL/L (ref 136–145)
SP GR UR STRIP.AUTO: 1.01 (ref 1–1.03)
SQUAMOUS #/AREA URNS AUTO: ABNORMAL /HPF
TRIGL SERPL-MCNC: 207 MG/DL (ref 37–140)
TSH SERPL-ACNC: 4.29 UIU/ML (ref 0.35–4.94)
UROBILINOGEN UR STRIP-ACNC: 0.2
VLDLC SERPL CALC-MCNC: 41 MG/DL
WBC # BLD AUTO: 7.21 X10(3)/MCL (ref 4.5–11.5)
WBC #/AREA URNS AUTO: ABNORMAL /HPF

## 2025-06-13 PROCEDURE — 81003 URINALYSIS AUTO W/O SCOPE: CPT

## 2025-06-13 PROCEDURE — 82306 VITAMIN D 25 HYDROXY: CPT

## 2025-06-13 PROCEDURE — 80061 LIPID PANEL: CPT

## 2025-06-13 PROCEDURE — 85025 COMPLETE CBC W/AUTO DIFF WBC: CPT

## 2025-06-13 PROCEDURE — 80053 COMPREHEN METABOLIC PANEL: CPT

## 2025-06-13 PROCEDURE — 82043 UR ALBUMIN QUANTITATIVE: CPT

## 2025-06-13 PROCEDURE — 36415 COLL VENOUS BLD VENIPUNCTURE: CPT

## 2025-06-13 PROCEDURE — 83036 HEMOGLOBIN GLYCOSYLATED A1C: CPT

## 2025-06-13 PROCEDURE — 84443 ASSAY THYROID STIM HORMONE: CPT

## 2025-06-19 ENCOUNTER — OFFICE VISIT (OUTPATIENT)
Dept: FAMILY MEDICINE | Facility: CLINIC | Age: 70
End: 2025-06-19
Payer: MEDICARE

## 2025-06-19 VITALS
WEIGHT: 191.5 LBS | OXYGEN SATURATION: 97 % | DIASTOLIC BLOOD PRESSURE: 73 MMHG | RESPIRATION RATE: 18 BRPM | TEMPERATURE: 97 F | HEART RATE: 61 BPM | SYSTOLIC BLOOD PRESSURE: 122 MMHG | BODY MASS INDEX: 30.78 KG/M2 | HEIGHT: 66 IN

## 2025-06-19 DIAGNOSIS — E11.9 TYPE 2 DIABETES MELLITUS WITHOUT COMPLICATION, UNSPECIFIED WHETHER LONG TERM INSULIN USE: ICD-10-CM

## 2025-06-19 DIAGNOSIS — E66.9 OBESITY, UNSPECIFIED CLASS, UNSPECIFIED OBESITY TYPE, UNSPECIFIED WHETHER SERIOUS COMORBIDITY PRESENT: ICD-10-CM

## 2025-06-19 DIAGNOSIS — Z00.00 WELLNESS EXAMINATION: Primary | ICD-10-CM

## 2025-06-19 DIAGNOSIS — E78.5 HYPERLIPIDEMIA, UNSPECIFIED HYPERLIPIDEMIA TYPE: ICD-10-CM

## 2025-06-19 DIAGNOSIS — I10 PRIMARY HYPERTENSION: ICD-10-CM

## 2025-06-19 DIAGNOSIS — K21.9 GASTROESOPHAGEAL REFLUX DISEASE, UNSPECIFIED WHETHER ESOPHAGITIS PRESENT: ICD-10-CM

## 2025-06-19 RX ORDER — OMEPRAZOLE 40 MG/1
40 CAPSULE, DELAYED RELEASE ORAL DAILY
Qty: 90 CAPSULE | Refills: 3 | Status: SHIPPED | OUTPATIENT
Start: 2025-06-19 | End: 2026-06-19

## 2025-06-19 RX ORDER — TIRZEPATIDE 2.5 MG/.5ML
2.5 INJECTION, SOLUTION SUBCUTANEOUS
Qty: 2 ML | Refills: 1 | Status: SHIPPED | OUTPATIENT
Start: 2025-06-19

## 2025-06-19 NOTE — PROGRESS NOTES
Internal Medicine    Rhoda Cesar is a 69 y.o. female here today for a Medicare Annual Wellness visit and comprehensive Health Risk Assessment.     Health Maintenance   Topic Date Due    RSV Vaccine (Age 60+ and Pregnant patients) (1 - Risk 60-74 years 1-dose series) Never done    COVID-19 Vaccine (4 - 2024-25 season) 09/01/2024    Mammogram  06/19/2025    Foot Exam  07/16/2025    Diabetic Eye Exam  12/03/2025    Hemoglobin A1c  12/13/2025    Diabetes Urine Screening  06/13/2026    Lipid Panel  06/13/2026    Low Dose Statin  06/19/2026    TETANUS VACCINE  10/11/2026    Colorectal Cancer Screening  05/04/2027    DEXA Scan  06/17/2027    Hepatitis C Screening  Completed    Shingles Vaccine  Completed    Influenza Vaccine  Completed    Pneumococcal Vaccines (Age 50+)  Completed       Past Medical History:  has a past medical history of Arthritis, Diabetes mellitus, Digestive disorder, Hyperlipidemia, Hypertension, and Thyroid disease.     Surgical History:  has a past surgical history that includes Cholecystectomy; Knee arthroscopy (Right); Tubal ligation; Appendectomy; Colonoscopy; robotic arthroplasty, knee (Right, 01/03/2023); and ANGIOGRAM, CORONARY, WITH LEFT HEART CATHETERIZATION (N/A, 06/09/2025).     Family History: family history includes Arrhythmia in her brother; Arthritis in her daughter and mother; Brain cancer in her brother; Cancer in her mother; Cervical cancer in her sister; Deep vein thrombosis in her sister; Diabetes in her mother and sister; Heart attack in her father; Heart failure in her brother and mother; Peripheral vascular disease in her father; Stroke in her brother, father, mother, and sister; Ulcerative colitis in her sister; Ulcers in her father.      Social History:  reports that she has never smoked. She has never used smokeless tobacco. She reports that she does not currently use alcohol. She reports that she does not use drugs.     Current Outpatient Medications   Medication  "Instructions    acetaminophen (TYLENOL) 650 mg, 2 times daily    alendronate (FOSAMAX) 70 mg, Oral, Every 7 days    ascorbic acid (vitamin C) (VITAMIN C) 500 mg, Daily    aspirin (ECOTRIN) 81 mg, Oral, Every 12 hours    blood sugar diagnostic (ONETOUCH ULTRA TEST) Strp 1 strip, Other, Daily    blood-glucose meter (ONETOUCH ULTRA2 METER) Misc USE TO CHECK BLOOD GLUCOSE LEVELS ONCE DAILY    furosemide (LASIX) 40 mg, Oral, 2 times daily    gabapentin (NEURONTIN) 600 mg, Oral, 2 times daily    gabapentin (NEURONTIN) 600 mg, 3 times daily    glipiZIDE (GLUCOTROL) 5 mg, Oral, 2 times daily before meals    ibuprofen (ADVIL,MOTRIN) 800 mg, Oral, Every 8 hours PRN    levocetirizine (XYZAL) 5 mg, Oral, Nightly    lisinopriL 10 mg, Oral, Daily    montelukast (SINGULAIR) 10 mg, Daily    MOUNJARO 2.5 mg, Subcutaneous, Every 7 days    multivitamin (THERAGRAN) per tablet 1 tablet, Daily    omeprazole (PRILOSEC) 40 mg, Oral, Daily    ONETOUCH DELICA PLUS LANCET 33 gauge Misc USE DAILY AND AS NEEDED FOR BLOOD GLUCOSE TESTING    rosuvastatin (CRESTOR) 10 mg, Oral, Daily    SYNTHROID 150 mcg, Oral, Daily    vitamin E 200 Units, Daily    zinc gluconate 50 mg, Daily        Patient is allergic to sulfa (sulfonamide antibiotics) and codeine.     Patient Care Team:  Radha Ponce FNP as PCP - General (Nurse Practitioner)  Cece Rojas LPN as Care Coordinator  #[id number], [facility]     Subjective   The following components were reviewed and updated:  Medical history  Family History  Social history  Allergies  Current Medications  Immunizations  Health Maintenance  Patient Care Team    Review of Systems    12 point review of systems conducted, negative except as stated in the history of present illness. See HPI for details.     Objective   Visit Vitals  /73 (BP Location: Left arm, Patient Position: Sitting)   Pulse 61   Temp 97 °F (36.1 °C) (Oral)   Resp 18   Ht 5' 6" (1.676 m)   Wt 86.9 kg (191 lb 8 oz)   SpO2 97%   BMI " 30.91 kg/m²        Physical Exam    General: Alert and oriented, No acute distress.  Head: Normocephalic, Atraumatic.  Eye: Pupils are equal, round and reactive to light, Extraocular movements are intact, Sclera non-icteric.  Ears/Nose/Throat: Normal, Mucosa moist,Clear.  Neck/Thyroid: Supple, Non-tender, No carotid bruit, No lymphadenopathy, No JVD, Full range of motion.  Respiratory: Clear to auscultation bilaterally; No wheezes, rales or rhonchi,Non-labored respirations, Symmetrical chest wall expansion.  Cardiovascular: Regular rate and rhythm, S1/S2 normal, No murmurs, rubs or gallops.  Gastrointestinal: Soft, Non-tender, Non-distended, Normal bowel sounds, No palpable organomegaly.  Musculoskeletal: Normal range of motion.  Integumentary: Warm, Dry, Intact, No suspicious lesions or rashes.  Extremities: No clubbing, cyanosis or edema  Neurologic: No focal deficits, Cranial Nerves II-XII are grossly intact, Motor strength normal upper and lower extremities, Sensory exam intact.  Psychiatric: Normal interaction, Coherent speech, Euthymic mood, Appropriate affect     Labs Reviewed    Chemistry:  Lab Results   Component Value Date     06/13/2025    K 5.4 (H) 06/13/2025    BUN 18.8 06/13/2025    CREATININE 0.81 06/13/2025    EGFRNORACEVR >60 06/13/2025    CALCIUM 9.4 06/13/2025    ALKPHOS 75 06/13/2025    ALBUMIN 4.0 06/13/2025    BILIDIR 0.4 06/14/2021    IBILI 0.60 06/14/2021    AST 21 06/13/2025    ALT 27 06/13/2025    MG 2.0 09/01/2019    [id number] 47 06/13/2025    TSH 4.288 06/13/2025    KNJWOQ0RTOY 1.30 01/04/2020        Lab Results   Component Value Date    HGBA1C 7.9 (H) 06/13/2025        Hematology:  Lab Results   Component Value Date    WBC 7.21 06/13/2025    HGB 13.7 06/13/2025    HCT 42.0 06/13/2025     06/13/2025       Lipid Panel:  Lab Results   Component Value Date    CHOL 161 06/13/2025    HDL 39 06/13/2025    LDL 81.00 06/13/2025    TRIG 207 (H) 06/13/2025    TOTALCHOLEST 4  06/13/2025        Urine:  Lab Results   Component Value Date    APPEARANCEUA Clear 06/13/2025    SGUA 1.010 06/13/2025    PROTEINUA Negative 06/13/2025    KETONESUA Negative 06/13/2025    LEUKOCYTESUR Moderate (A) 06/13/2025    RBCUA 0-5 06/13/2025    WBCUA 3-5 06/13/2025    BACTERIA None Seen 06/13/2025    SQEPUA  (A) 09/24/2019    HYALINECASTS 0 09/24/2019    CREATRANDUR 94.1 06/13/2025           Assessment/Plan:  Assessment & Plan  Wellness examination  Healthy lifestyle discussed. Will follow up with Podiatry for diabetic foot exam.  Mammogram scheduled for June 23rd.  Primary hypertension  Well controlled.  Keep scheduled follow-up appointments with Cardiology.  Continue  Hypertension Medications              furosemide (LASIX) 40 MG tablet Take 1 tablet (40 mg total) by mouth 2 (two) times a day.    lisinopriL 10 MG tablet Take 1 tablet (10 mg total) by mouth once daily.        Low Sodium Diet (DASH Diet - Less than 2 grams of sodium per day).  Monitor blood pressure daily and log. Report consistent numbers greater than 140/90.  Maintain healthy weight with goal BMI <30. Exercise 30 minutes per day, 5 days per week.  Smoking cessation encouraged to aid in BP reduction.  Hyperlipidemia, unspecified hyperlipidemia type  Lab Results   Component Value Date    LDL 81.00 06/13/2025    TRIG 207 (H) 06/13/2025    HDL 39 06/13/2025    TOTALCHOLEST 4 06/13/2025   Not at goal.  Patient reports eating more fast foods lately.  Low-fat, low-cholesterol diet advised.  Continue  Hyperlipidemia Medications              rosuvastatin (CRESTOR) 10 MG tablet Take 1 tablet (10 mg total) by mouth once daily.        Stressed importance of dietary modifications. Follow a low cholesterol, low saturated fat diet with less that 200mg of cholesterol a day.  Avoid fried foods and high saturated fats (high saturated fats less than 7% of calories).  Add Flax Seed/Fish Oil supplements to diet. Increase dietary fiber.  Regular exercise  can reduce LDL and raise HDL. Stressed importance of physical activity 5 times per week for 30 minutes per day.   Gastroesophageal reflux disease, unspecified whether esophagitis present  Not at goal.  DC pantoprazole.  Start omeprazole 40 mg p.o. daily.  Avoid spicy, acidic, fried foods and alcohol.  Eat 2-3 hours before going to bed.  Avoid tight clothing, chew food thoroughly.  Reduce caffeine intake, avoid soda.  Stressed importance of Smoking/Tobacco Cessation.    Orders:    omeprazole (PRILOSEC) 40 MG capsule; Take 1 capsule (40 mg total) by mouth once daily.    Type 2 diabetes mellitus without complication, unspecified whether long term insulin use  Lab Results   Component Value Date    HGBA1C 7.9 (H) 06/13/2025    HGBA1C 7.4 (H) 03/17/2025    LDL 81.00 06/13/2025    CREATININE 0.81 06/13/2025      Continue   Diabetes Medications              glipiZIDE (GLUCOTROL) 5 MG tablet Take 1 tablet (5 mg total) by mouth 2 (two) times daily before meals.        Follow ADA Diet. Avoid soda, simple sweets, and limit rice/pasta/breads/starches (no more than 45-50 grams per meal).  Maintain healthy weight with goal BMI <30.  Exercise 5 times per week for 30 minutes per day.  Stressed importance of daily foot exams.  Stressed importance of annual dilated eye exam.  Obesity, unspecified class, unspecified obesity type, unspecified whether serious comorbidity present  Body mass index is 30.91 kg/m².  Goal BMI <30.  Exercise 5 times a week for 30 minutes per day.  Avoid soda, simple sugars, excessive rice, potatoes or bread. Limit fast foods and fried foods.  Choose complex carbs in moderation (example: green vegetables, beans, oatmeal). Eat plenty of fresh fruits and vegetables with lean meats daily.  Do not skip meals. Eat a balanced portion size.  Avoid fad diets. Consider permanent healthy life style changes.            A comprehensive HEALTH RISK ASSESSMENT was completed today. Results are summarized below:    There are  NO EMOTIONAL/SOCIAL CONCERNS identified on today's screening for Social Isolation, Depression and Anxiety.    The following COGNITIVE FUNCTION CONCERNS were identified on today's screening:  *Patient reports others comment she FREQUENTLY REPEATS THINGS. (Do others tell you that you repeat questions/statements in the same day?: (!) Yes)  *Patient reports she FREQUENTLY HAS TROUBLE REMEMBERING. (Do you have trouble remembering the date, year, and time?: (!) Yes)  The following FUNCTIONAL AND/OR SAFETY CONCERNS were identified on today's screening for Physical Symptoms, Nutritional, Home Safety/Living Situation, Fall Risk, Activities of Daily Living, Independent Activities of Daily Living, Physical Activity,Timed Up and Go test and Whisper test::  *Patient reports recent HEARING/VISION DIFFICULTIES. (Have you noticed any hearing or vision difficulties?: (!) Yes (vision, wears contacts))  *Patient reports NO ROUTINE EXERCISE. (On average, how many days per week do you engage in moderate to strenuous exercise (like a brisk walk)?: (!) 0)      The patient reports NO OPIOID PRESCRIPTIONS. This was confirmed through medication reconciliation.    The patient is NOT A TOBACCO USER.  The patient reports NO SIGNIFICANT ALCOHOL USE.     All Questions regarding food, transportation or housing were not answered today.    Education, counseling, and referrals were provided as documented above and can be viewed in the After Visit Summary.    Follow up in about 3 months (around 9/19/2025) for DM. In addition to this scheduled follow up, the patient has also been instructed to follow up on as needed basis.     The patient was asked and declined the use of a free .Advance Care Planning   Today we discussed advance care planning. She is interested in learning more about how to make Advance Directives. Information was provided and I offered to discuss more at her discretion.    Radha Ponce, BRUNO

## 2025-06-19 NOTE — ASSESSMENT & PLAN NOTE
Lab Results   Component Value Date    HGBA1C 7.9 (H) 06/13/2025    HGBA1C 7.4 (H) 03/17/2025    LDL 81.00 06/13/2025    CREATININE 0.81 06/13/2025      Continue   Diabetes Medications              glipiZIDE (GLUCOTROL) 5 MG tablet Take 1 tablet (5 mg total) by mouth 2 (two) times daily before meals.        Follow ADA Diet. Avoid soda, simple sweets, and limit rice/pasta/breads/starches (no more than 45-50 grams per meal).  Maintain healthy weight with goal BMI <30.  Exercise 5 times per week for 30 minutes per day.  Stressed importance of daily foot exams.  Stressed importance of annual dilated eye exam.

## 2025-06-19 NOTE — ASSESSMENT & PLAN NOTE
Well controlled.  Keep scheduled follow-up appointments with Cardiology.  Continue  Hypertension Medications              furosemide (LASIX) 40 MG tablet Take 1 tablet (40 mg total) by mouth 2 (two) times a day.    lisinopriL 10 MG tablet Take 1 tablet (10 mg total) by mouth once daily.        Low Sodium Diet (DASH Diet - Less than 2 grams of sodium per day).  Monitor blood pressure daily and log. Report consistent numbers greater than 140/90.  Maintain healthy weight with goal BMI <30. Exercise 30 minutes per day, 5 days per week.  Smoking cessation encouraged to aid in BP reduction.

## 2025-06-19 NOTE — ASSESSMENT & PLAN NOTE
Lab Results   Component Value Date    LDL 81.00 06/13/2025    TRIG 207 (H) 06/13/2025    HDL 39 06/13/2025    TOTALCHOLEST 4 06/13/2025   Not at goal.  Patient reports eating more fast foods lately.  Low-fat, low-cholesterol diet advised.  Continue  Hyperlipidemia Medications              rosuvastatin (CRESTOR) 10 MG tablet Take 1 tablet (10 mg total) by mouth once daily.        Stressed importance of dietary modifications. Follow a low cholesterol, low saturated fat diet with less that 200mg of cholesterol a day.  Avoid fried foods and high saturated fats (high saturated fats less than 7% of calories).  Add Flax Seed/Fish Oil supplements to diet. Increase dietary fiber.  Regular exercise can reduce LDL and raise HDL. Stressed importance of physical activity 5 times per week for 30 minutes per day.

## 2025-06-19 NOTE — ASSESSMENT & PLAN NOTE
Healthy lifestyle discussed. Will follow up with Podiatry for diabetic foot exam.  Mammogram scheduled for June 23rd.

## 2025-06-23 ENCOUNTER — HOSPITAL ENCOUNTER (OUTPATIENT)
Dept: RADIOLOGY | Facility: HOSPITAL | Age: 70
Discharge: HOME | End: 2025-06-23
Attending: NURSE PRACTITIONER
Payer: MEDICARE

## 2025-06-23 ENCOUNTER — TELEPHONE (OUTPATIENT)
Dept: FAMILY MEDICINE | Facility: CLINIC | Age: 70
End: 2025-06-23
Payer: MEDICARE

## 2025-06-23 DIAGNOSIS — Z76.0 MEDICATION REFILL: ICD-10-CM

## 2025-06-23 DIAGNOSIS — Z12.31 ENCOUNTER FOR SCREENING MAMMOGRAM FOR BREAST CANCER: ICD-10-CM

## 2025-06-23 PROCEDURE — 77063 BREAST TOMOSYNTHESIS BI: CPT | Mod: 26,,, | Performed by: RADIOLOGY

## 2025-06-23 PROCEDURE — 77067 SCR MAMMO BI INCL CAD: CPT | Mod: 26,,, | Performed by: RADIOLOGY

## 2025-06-23 PROCEDURE — 77063 BREAST TOMOSYNTHESIS BI: CPT | Mod: TC

## 2025-06-23 NOTE — TELEPHONE ENCOUNTER
Spoke with pt. Is taking CBGs BID. Refill sent. ----- Message from Aga sent at 6/23/2025  8:46 AM CDT -----  Pt stopped by and asked if she can get extra One Touch Ultra Strips. She said she takes 2 test a day, and what is given, it's not enough. Please advise.

## 2025-06-24 ENCOUNTER — HOSPITAL ENCOUNTER (EMERGENCY)
Facility: HOSPITAL | Age: 70
Discharge: HOME | End: 2025-06-24
Attending: EMERGENCY MEDICINE
Payer: MEDICARE

## 2025-06-24 ENCOUNTER — TELEPHONE (OUTPATIENT)
Dept: RADIOLOGY | Facility: HOSPITAL | Age: 70
End: 2025-06-24
Payer: MEDICARE

## 2025-06-24 VITALS
OXYGEN SATURATION: 96 % | HEART RATE: 65 BPM | BODY MASS INDEX: 30.86 KG/M2 | DIASTOLIC BLOOD PRESSURE: 57 MMHG | RESPIRATION RATE: 20 BRPM | HEIGHT: 66 IN | WEIGHT: 192 LBS | TEMPERATURE: 98 F | SYSTOLIC BLOOD PRESSURE: 107 MMHG

## 2025-06-24 DIAGNOSIS — R06.02 SHORTNESS OF BREATH: ICD-10-CM

## 2025-06-24 DIAGNOSIS — R92.8 ABNORMAL MAMMOGRAM: ICD-10-CM

## 2025-06-24 DIAGNOSIS — E13.65 OTHER SPECIFIED DIABETES MELLITUS WITH HYPERGLYCEMIA, UNSPECIFIED WHETHER LONG TERM INSULIN USE: ICD-10-CM

## 2025-06-24 DIAGNOSIS — Z00.00 EVALUATION BY MEDICAL SERVICE REQUIRED: Primary | ICD-10-CM

## 2025-06-24 DIAGNOSIS — M54.12 CERVICAL RADICULOPATHY: ICD-10-CM

## 2025-06-24 DIAGNOSIS — M79.601 RIGHT ARM PAIN: ICD-10-CM

## 2025-06-24 LAB
ALBUMIN SERPL-MCNC: 3.7 G/DL (ref 3.4–4.8)
ALBUMIN/GLOB SERPL: 1 RATIO (ref 1.1–2)
ALP SERPL-CCNC: 79 UNIT/L (ref 40–150)
ALT SERPL-CCNC: 25 UNIT/L (ref 0–55)
ANION GAP SERPL CALC-SCNC: 7 MEQ/L
AST SERPL-CCNC: 19 UNIT/L (ref 11–45)
BASOPHILS # BLD AUTO: 0.02 X10(3)/MCL
BASOPHILS NFR BLD AUTO: 0.2 %
BILIRUB SERPL-MCNC: 1.2 MG/DL
BNP BLD-MCNC: 48.4 PG/ML
BUN SERPL-MCNC: 16.6 MG/DL (ref 9.8–20.1)
CALCIUM SERPL-MCNC: 10.1 MG/DL (ref 8.4–10.2)
CHLORIDE SERPL-SCNC: 102 MMOL/L (ref 98–107)
CK SERPL-CCNC: 78 U/L (ref 29–168)
CO2 SERPL-SCNC: 28 MMOL/L (ref 23–31)
CREAT SERPL-MCNC: 0.87 MG/DL (ref 0.55–1.02)
CREAT/UREA NIT SERPL: 19
D DIMER PPP IA.FEU-MCNC: 0.28 UG/ML FEU (ref 0–0.5)
EOSINOPHIL # BLD AUTO: 0.03 X10(3)/MCL (ref 0–0.9)
EOSINOPHIL NFR BLD AUTO: 0.2 %
ERYTHROCYTE [DISTWIDTH] IN BLOOD BY AUTOMATED COUNT: 13.1 % (ref 11.5–17)
FLUAV AG UPPER RESP QL IA.RAPID: NOT DETECTED
FLUBV AG UPPER RESP QL IA.RAPID: NOT DETECTED
GFR SERPLBLD CREATININE-BSD FMLA CKD-EPI: >60 ML/MIN/1.73/M2
GLOBULIN SER-MCNC: 3.7 GM/DL (ref 2.4–3.5)
GLUCOSE SERPL-MCNC: 248 MG/DL (ref 82–115)
HCT VFR BLD AUTO: 39.6 % (ref 37–47)
HGB BLD-MCNC: 12.6 G/DL (ref 12–16)
IMM GRANULOCYTES # BLD AUTO: 0.05 X10(3)/MCL (ref 0–0.04)
IMM GRANULOCYTES NFR BLD AUTO: 0.4 %
LYMPHOCYTES # BLD AUTO: 1.21 X10(3)/MCL (ref 0.6–4.6)
LYMPHOCYTES NFR BLD AUTO: 9.3 %
MCH RBC QN AUTO: 29.2 PG (ref 27–31)
MCHC RBC AUTO-ENTMCNC: 31.8 G/DL (ref 33–36)
MCV RBC AUTO: 91.7 FL (ref 80–94)
MONOCYTES # BLD AUTO: 1.04 X10(3)/MCL (ref 0.1–1.3)
MONOCYTES NFR BLD AUTO: 8 %
NEUTROPHILS # BLD AUTO: 10.68 X10(3)/MCL (ref 2.1–9.2)
NEUTROPHILS NFR BLD AUTO: 81.9 %
NRBC BLD AUTO-RTO: 0 %
OHS QRS DURATION: 100 MS
OHS QTC CALCULATION: 460 MS
PLATELET # BLD AUTO: 174 X10(3)/MCL (ref 130–400)
PMV BLD AUTO: 9.7 FL (ref 7.4–10.4)
POCT GLUCOSE: 235 MG/DL (ref 70–110)
POTASSIUM SERPL-SCNC: 3.6 MMOL/L (ref 3.5–5.1)
PROT SERPL-MCNC: 7.4 GM/DL (ref 5.8–7.6)
RBC # BLD AUTO: 4.32 X10(6)/MCL (ref 4.2–5.4)
RSV A 5' UTR RNA NPH QL NAA+PROBE: NOT DETECTED
SARS-COV-2 RNA RESP QL NAA+PROBE: NOT DETECTED
SODIUM SERPL-SCNC: 137 MMOL/L (ref 136–145)
TROPONIN I SERPL-MCNC: 0.01 NG/ML (ref 0–0.04)
TROPONIN I SERPL-MCNC: <0.01 NG/ML (ref 0–0.04)
WBC # BLD AUTO: 13.03 X10(3)/MCL (ref 4.5–11.5)

## 2025-06-24 PROCEDURE — 93005 ELECTROCARDIOGRAM TRACING: CPT

## 2025-06-24 PROCEDURE — 96374 THER/PROPH/DIAG INJ IV PUSH: CPT

## 2025-06-24 PROCEDURE — 85025 COMPLETE CBC W/AUTO DIFF WBC: CPT | Performed by: EMERGENCY MEDICINE

## 2025-06-24 PROCEDURE — 83880 ASSAY OF NATRIURETIC PEPTIDE: CPT | Performed by: EMERGENCY MEDICINE

## 2025-06-24 PROCEDURE — 25000003 PHARM REV CODE 250: Performed by: EMERGENCY MEDICINE

## 2025-06-24 PROCEDURE — 84484 ASSAY OF TROPONIN QUANT: CPT | Performed by: EMERGENCY MEDICINE

## 2025-06-24 PROCEDURE — 82550 ASSAY OF CK (CPK): CPT | Performed by: EMERGENCY MEDICINE

## 2025-06-24 PROCEDURE — 80053 COMPREHEN METABOLIC PANEL: CPT | Performed by: EMERGENCY MEDICINE

## 2025-06-24 PROCEDURE — 63600175 PHARM REV CODE 636 W HCPCS: Mod: JZ,TB | Performed by: EMERGENCY MEDICINE

## 2025-06-24 PROCEDURE — 99285 EMERGENCY DEPT VISIT HI MDM: CPT | Mod: 25

## 2025-06-24 PROCEDURE — 85379 FIBRIN DEGRADATION QUANT: CPT | Performed by: EMERGENCY MEDICINE

## 2025-06-24 PROCEDURE — 0241U COVID/RSV/FLU A&B PCR: CPT | Performed by: EMERGENCY MEDICINE

## 2025-06-24 PROCEDURE — 96361 HYDRATE IV INFUSION ADD-ON: CPT

## 2025-06-24 PROCEDURE — 93010 ELECTROCARDIOGRAM REPORT: CPT | Mod: ,,, | Performed by: INTERNAL MEDICINE

## 2025-06-24 PROCEDURE — 82962 GLUCOSE BLOOD TEST: CPT

## 2025-06-24 RX ORDER — KETOROLAC TROMETHAMINE 30 MG/ML
15 INJECTION, SOLUTION INTRAMUSCULAR; INTRAVENOUS
Status: COMPLETED | OUTPATIENT
Start: 2025-06-24 | End: 2025-06-24

## 2025-06-24 RX ORDER — MELOXICAM 15 MG/1
15 TABLET ORAL DAILY
Qty: 30 TABLET | Refills: 0 | Status: SHIPPED | OUTPATIENT
Start: 2025-06-24 | End: 2025-06-27 | Stop reason: SDUPTHER

## 2025-06-24 RX ORDER — LIDOCAINE 50 MG/G
1 PATCH TOPICAL
Status: DISCONTINUED | OUTPATIENT
Start: 2025-06-24 | End: 2025-06-24 | Stop reason: HOSPADM

## 2025-06-24 RX ORDER — TIZANIDINE 4 MG/1
4 TABLET ORAL EVERY 6 HOURS PRN
Qty: 30 TABLET | Refills: 0 | Status: SHIPPED | OUTPATIENT
Start: 2025-06-24 | End: 2025-06-27 | Stop reason: SDUPTHER

## 2025-06-24 RX ORDER — LIDOCAINE 50 MG/G
1 PATCH TOPICAL DAILY
Qty: 30 PATCH | Refills: 0 | Status: SHIPPED | OUTPATIENT
Start: 2025-06-24 | End: 2025-07-24

## 2025-06-24 RX ORDER — TIZANIDINE 4 MG/1
4 TABLET ORAL
Status: COMPLETED | OUTPATIENT
Start: 2025-06-24 | End: 2025-06-24

## 2025-06-24 RX ADMIN — TIZANIDINE 4 MG: 4 TABLET ORAL at 09:06

## 2025-06-24 RX ADMIN — SODIUM CHLORIDE 1000 ML: 9 INJECTION, SOLUTION INTRAVENOUS at 01:06

## 2025-06-24 RX ADMIN — KETOROLAC TROMETHAMINE 15 MG: 30 INJECTION, SOLUTION INTRAMUSCULAR; INTRAVENOUS at 09:06

## 2025-06-24 RX ADMIN — LIDOCAINE 1 PATCH: 50 PATCH TOPICAL at 09:06

## 2025-06-24 NOTE — ED PROVIDER NOTES
Encounter Date: 6/24/2025       History     Chief Complaint   Patient presents with    Medical Evaluation     Pt to ED via POV. Pt reports R sided pain since yesterday and shortness of breath on exertion and difficulty taking deep breaths. Denies numbness/tingling. Had angiogram 2 weeks ago. GCS 15     69-year-old female with a history of hypertension, hyperlipidemia, diabetes mellitus presents to the emergency department for evaluation of right arm pain radiating from the upper shoulder into the hands since yesterday.  Reports that she had to sleep with the arm elevated overnight.  Also reports shortness of breath or she feels like her breath will catch.  She denies any associated chest pain.  Denies any fever or chills.      Underwent angiogram 2 weeks ago which demonstrated No obstructive CAD.  EF 60% at that time.    The history is provided by the patient and medical records.     Review of patient's allergies indicates:   Allergen Reactions    Sulfa (sulfonamide antibiotics) Anaphylaxis    Codeine Other (See Comments)     Inability to walk, back pain     Past Medical History:   Diagnosis Date    Arthritis     Diabetes mellitus     Digestive disorder     Hyperlipidemia     Hypertension     Thyroid disease      Past Surgical History:   Procedure Laterality Date    ANGIOGRAM, CORONARY, WITH LEFT HEART CATHETERIZATION N/A 06/09/2025    Procedure: Angiogram, Coronary, with Left Heart Cath;  Surgeon: Lillian Ramos MD;  Location: [facility] CATH LAB;  Service: Cardiology;  Laterality: N/A;  [facility]    APPENDECTOMY      CHOLECYSTECTOMY      COLONOSCOPY      KNEE ARTHROSCOPY Right     ROBOTIC ARTHROPLASTY, KNEE Right 01/03/2023    Procedure: ROBOTIC ARTHROPLASTY, KNEE, TOTAL;  Surgeon: Dev Weaver MD;  Location: [facility];  Service: Orthopedics;  Laterality: Right;    TUBAL LIGATION       Family History   Problem Relation Name Age of Onset    Stroke Mother Lesli brownlee     Diabetes Mother Lesli brownlee     Heart failure Mother  Lesli brownlee     Cancer Mother Lesli brownlee     Arthritis Mother Lesli brownlee     Stroke Father      Heart attack Father      Peripheral vascular disease Father      Ulcers Father      Stroke Sister      Diabetes Sister      Cervical cancer Sister      Deep vein thrombosis Sister      Ulcerative colitis Sister      Stroke Brother      Arrhythmia Brother      Heart failure Brother      Brain cancer Brother          cause of death    Arthritis Daughter Melvi Cesar      Social History[1]  Review of Systems   Constitutional:  Negative for fever.   Respiratory:  Positive for shortness of breath.    Cardiovascular:  Negative for chest pain.   Gastrointestinal:  Negative for nausea and vomiting.   Musculoskeletal:  Positive for arthralgias and myalgias.       Physical Exam     Initial Vitals [06/24/25 0858]   BP Pulse Resp Temp SpO2   (!) 145/71 83 18 97.8 °F (36.6 °C) 99 %      MAP       --         Physical Exam    Nursing note and vitals reviewed.  Constitutional: She appears well-developed and well-nourished. No distress.   HENT:   Head: Normocephalic and atraumatic. Mouth/Throat: Oropharynx is clear and moist.   Eyes: Conjunctivae are normal. Pupils are equal, round, and reactive to light.   Neck: Neck supple.   Normal range of motion.  Cardiovascular:  Normal rate, regular rhythm and normal heart sounds.           No murmur heard.  Pulmonary/Chest: Breath sounds normal. No respiratory distress.   Abdominal: Abdomen is soft. She exhibits no distension. There is no abdominal tenderness.   Musculoskeletal:         General: Normal range of motion.      Cervical back: Normal range of motion and neck supple.      Comments: Pain with attempted range motion of the right upper extremity of the shoulder.  No obvious deformity.  Normal  strength  Sensation grossly intact to light touch throughout the bilateral upper extremities  No point vertebral tenderness in the cervical spine; however, tenderness along the right trapezius  muscle and parasternal chest wall     Neurological: She is alert and oriented to person, place, and time. GCS score is 15. GCS eye subscore is 4. GCS verbal subscore is 5. GCS motor subscore is 6.   Skin: Skin is warm and dry. No rash noted.   Psychiatric: She has a normal mood and affect.         ED Course   Procedures  Labs Reviewed   COMPREHENSIVE METABOLIC PANEL - Abnormal       Result Value    Sodium 137      Potassium 3.6      Chloride 102      CO2 28      Glucose 248 (*)     Blood Urea Nitrogen 16.6      Creatinine 0.87      Calcium 10.1      Protein Total 7.4      Albumin 3.7      Globulin 3.7 (*)     Albumin/Globulin Ratio 1.0 (*)     Bilirubin Total 1.2      ALP 79      ALT 25      AST 19      eGFR >60      Anion Gap 7.0      BUN/Creatinine Ratio 19     CBC WITH DIFFERENTIAL - Abnormal    WBC 13.03 (*)     RBC 4.32      Hgb 12.6      Hct 39.6      MCV 91.7      MCH 29.2      MCHC 31.8 (*)     RDW 13.1      Platelet 174      MPV 9.7      Neut % 81.9      Lymph % 9.3      Mono % 8.0      Eos % 0.2      Basophil % 0.2      Imm Grans % 0.4      Neut # 10.68 (*)     Lymph # 1.21      Mono # 1.04      Eos # 0.03      Baso # 0.02      Imm Gran # 0.05 (*)     NRBC% 0.0     POCT GLUCOSE - Abnormal    POCT Glucose 235 (*)    TROPONIN I - Normal    Troponin-I <0.010     CK - Normal    Creatine Kinase 78     D DIMER, QUANTITATIVE - Normal    D-Dimer 0.28     B-TYPE NATRIURETIC PEPTIDE - Normal    Natriuretic Peptide 48.4     COVID/RSV/FLU A&B PCR - Normal    Influenza A PCR Not Detected      Influenza B PCR Not Detected      Respiratory Syncytial Virus PCR Not Detected      SARS-CoV-2 PCR Not Detected      Narrative:     The Xpert Xpress SARS-CoV-2/FLU/RSV plus is a rapid, multiplexed real-time PCR test intended for the simultaneous qualitative detection and differentiation of SARS-CoV-2, Influenza A, Influenza B, and respiratory syncytial virus (RSV) viral RNA in either nasopharyngeal swab or nasal swab  specimens.         TROPONIN I - Normal    Troponin-I 0.010     CBC W/ AUTO DIFFERENTIAL    Narrative:     The following orders were created for panel order CBC auto differential.  Procedure                               Abnormality         Status                     ---------                               -----------         ------                     CBC with Differential[[id number]]       Abnormal            Final result                 Please view results for these tests on the individual orders.     EKG Readings: (Independently Interpreted)   Initial Reading: No STEMI. Rhythm: Normal Sinus Rhythm. Heart Rate: 79. Axis: Normal. Clinical Impression: Normal Sinus Rhythm and Left Ventricular Hypertrophy (LVH)   06/24/2025 @ 0859     ECG Results              EKG 12-lead (Final result)        Collection Time Result Time QRS Duration OHS QTC Calculation    06/24/25 08:59:50 06/24/25 14:10:23 100 460                     Final result by Interface, Lab In [facility] (06/24/25 14:10:28)                   Narrative:    Test Reason : Z00.00,    Vent. Rate :  79 BPM     Atrial Rate :  79 BPM     P-R Int : 150 ms          QRS Dur : 100 ms      QT Int : 402 ms       P-R-T Axes :  35  13  53 degrees    QTcB Int : 460 ms    Normal sinus rhythm  Minimal voltage criteria for LVH, may be normal variant ( Talmage product )   Abnormal ECG    Confirmed by Trevor Tan ([id number]) on 6/24/2025 2:10:18 PM    Referred By: AAAREFERRAL SELF           Confirmed By: Trevor Tan                                  Imaging Results              CT Cervical Spine Without Contrast (Final result)  Result time 06/24/25 10:03:22      Final result by Madai Flaherty MD (06/24/25 10:03:22)                   Impression:      No acute fracture identified.      Electronically signed by: Madai Flaherty  Date:    06/24/2025  Time:    10:03               Narrative:    EXAMINATION:  CT CERVICAL SPINE WITHOUT CONTRAST    CLINICAL HISTORY:  Cervical radiculopathy,  no red flags;    TECHNIQUE:  Noncontrast CT images of the cervical spine. Axial, coronal, and sagittal reformatted images were obtained. Dose length product is 316 mGycm. Automatic exposure control, adjustment of mA/kV or iterative reconstruction technique was used to limit radiation dose.    COMPARISON:  X-rays dated 04/02/2015    FINDINGS:  The cervical spine is visualized through the level of T1    There is no acute fracture identified.  There are multilevel degenerative changes with disc height loss, marginal osteophyte formation and facet arthropathy.  There is no paraspinal hematoma.                                       X-Ray Shoulder Complete 2 View Right (Final result)  Result time 06/24/25 09:50:44      Final result by Madai Flaherty MD (06/24/25 09:50:44)                   Impression:      No acute bony abnormality.      Electronically signed by: Madai Flaherty  Date:    06/24/2025  Time:    09:50               Narrative:    EXAMINATION:  XR SHOULDER COMPLETE 2 OR MORE VIEWS RIGHT    CLINICAL HISTORY:  Pain in right arm    COMPARISON:  X-rays dated 11/05/2015    FINDINGS:  There is no acute fracture or malalignment.  The soft tissues are unremarkable.                                       X-Ray Chest AP Portable (Final result)  Result time 06/24/25 09:51:20      Final result by Madai Flaherty MD (06/24/25 09:51:20)                   Impression:      No acute abnormality of the chest.      Electronically signed by: Madai Flaherty  Date:    06/24/2025  Time:    09:51               Narrative:    EXAMINATION:  XR CHEST AP PORTABLE    CLINICAL HISTORY:  Shortness of breath    COMPARISON:  Chest x-ray dated 03/25/2025    FINDINGS:  The heart is stable in size.  There is no focal airspace consolidation.  No pleural effusion or visible pneumothorax.                                       Medications   LIDOcaine 5 % patch 1 patch (1 patch Transdermal Patch Applied 6/24/25 9147)   ketorolac injection  15 mg (15 mg Intravenous Given 6/24/25 0930)   tiZANidine tablet 4 mg (4 mg Oral Given 6/24/25 0929)   sodium chloride 0.9% bolus 1,000 mL 1,000 mL (0 mLs Intravenous Stopped 6/24/25 1404)     Medical Decision Making  Problems Addressed:  Cervical radiculopathy: acute illness or injury  Other specified diabetes mellitus with hyperglycemia, unspecified whether long term insulin use: chronic illness or injury with exacerbation, progression, or side effects of treatment  Right arm pain: acute illness or injury    Amount and/or Complexity of Data Reviewed  Labs: ordered.  Radiology: ordered.    Risk  Prescription drug management.      ED assessment:    Ms. Cesar presented for evaluation of atraumatic right upper extremity pain.  Reproducible with palpation and range of motion.  No appreciable sensorimotor motor deficits at this time.    Differential diagnosis (including but not limited to):   Nerve impingement, muscle strain, radiculopathy, cervical degenerative disc disease, spinal stenosis, atypical angina consider though felt less likely    ED management:  ED workup overall reassuring.  CT of the cervical spine does demonstrate multilevel degenerative changes.  No evidence of acute coronary syndrome at this time with nonischemic EKG, negative cardiac enzymes.  Symptomatic relief with NSAIDs, muscle relaxants and topical analgesics in the ED.  Will prescribe similar combination of symptomatic treatments for home use.  Instructed to follow up with primary care, should symptoms persist/worsen, consider advanced imaging, physical therapy or spinal surgery referral.  ED return precautions reviewed at the bedside and provided in the written discharge instructions. All questions answered to the best of my ability.       My independent radiology interpretation:   Chest x-ray: No focal infiltrate or effusion, no pneumothorax    Amount and/or Complexity of Data Reviewed  Independent historian: none   Summary of history:    External data reviewed: previous procedure and OR notes  Summary of data reviewed:  Previous angiogram earlier this month with the results as documented above  Risk and benefits of testing: discussed   Labs: ordered and reviewed  Radiology: ordered and independent interpretation performed (see above or ED course)  ECG/medicine tests: ordered and independent interpretation performed (see above or ED course)      Risk  Prescription drug management   Shared decision making     Critical Care  none    I, Joanne Epps MD personally performed the history, PE, MDM, and procedures as documented above and agree with the scribe's documentation.                                     Clinical Impression:  Final diagnoses:  [R06.02] Shortness of breath  [M79.601] Right arm pain  [M54.12] Cervical radiculopathy  [E13.65] Other specified diabetes mellitus with hyperglycemia, unspecified whether long term insulin use          ED Disposition Condition    Discharge Stable          ED Prescriptions       Medication Sig Dispense Start Date End Date Auth. Provider    meloxicam (MOBIC) 15 MG tablet Take 1 tablet (15 mg total) by mouth once daily. 30 tablet 6/24/2025 7/24/2025 Joanne Epps MD    LIDOcaine (LIDODERM) 5 % Place 1 patch onto the skin once daily. Remove & Discard patch within 12 hours or as directed by MD 30 patch 6/24/2025 7/24/2025 Joanne Epps MD    tiZANidine (ZANAFLEX) 4 MG tablet Take 1 tablet (4 mg total) by mouth every 6 (six) hours as needed (Muscle spasm). 30 tablet 6/24/2025 7/4/2025 Joanne Epps MD          Follow-up Information       Follow up With Specialties Details Why Contact Info    Radha Ponce, FNP Family Medicine Schedule an appointment as soon as possible for a visit   [address]  [address]  [place] [address]  [phone]      [facility] - Emergency Dept Emergency Medicine  As needed, If symptoms worsen [address]  [address]  [phone]                   [1]   Social History  Tobacco Use    Smoking status: Never    Smokeless tobacco: Never   Substance Use Topics    Alcohol use: Not Currently    Drug use: Never        Joanne Epps MD  07/22/25 3192

## 2025-06-27 ENCOUNTER — OFFICE VISIT (OUTPATIENT)
Dept: FAMILY MEDICINE | Facility: CLINIC | Age: 70
End: 2025-06-27
Payer: MEDICARE

## 2025-06-27 VITALS
WEIGHT: 186.81 LBS | RESPIRATION RATE: 18 BRPM | HEART RATE: 58 BPM | TEMPERATURE: 98 F | HEIGHT: 66 IN | DIASTOLIC BLOOD PRESSURE: 67 MMHG | BODY MASS INDEX: 30.02 KG/M2 | SYSTOLIC BLOOD PRESSURE: 106 MMHG | OXYGEN SATURATION: 98 %

## 2025-06-27 DIAGNOSIS — M25.511 CHRONIC RIGHT SHOULDER PAIN: ICD-10-CM

## 2025-06-27 DIAGNOSIS — M54.12 CERVICAL RADICULOPATHY: Primary | ICD-10-CM

## 2025-06-27 DIAGNOSIS — G89.29 CHRONIC RIGHT SHOULDER PAIN: ICD-10-CM

## 2025-06-27 PROCEDURE — 4010F ACE/ARB THERAPY RXD/TAKEN: CPT | Mod: ,,, | Performed by: NURSE PRACTITIONER

## 2025-06-27 PROCEDURE — 3061F NEG MICROALBUMINURIA REV: CPT | Mod: ,,, | Performed by: NURSE PRACTITIONER

## 2025-06-27 PROCEDURE — 3066F NEPHROPATHY DOC TX: CPT | Mod: ,,, | Performed by: NURSE PRACTITIONER

## 2025-06-27 PROCEDURE — 99213 OFFICE O/P EST LOW 20 MIN: CPT | Mod: ,,, | Performed by: NURSE PRACTITIONER

## 2025-06-27 PROCEDURE — 3288F FALL RISK ASSESSMENT DOCD: CPT | Mod: ,,, | Performed by: NURSE PRACTITIONER

## 2025-06-27 PROCEDURE — 3008F BODY MASS INDEX DOCD: CPT | Mod: ,,, | Performed by: NURSE PRACTITIONER

## 2025-06-27 PROCEDURE — 3078F DIAST BP <80 MM HG: CPT | Mod: ,,, | Performed by: NURSE PRACTITIONER

## 2025-06-27 PROCEDURE — 1101F PT FALLS ASSESS-DOCD LE1/YR: CPT | Mod: ,,, | Performed by: NURSE PRACTITIONER

## 2025-06-27 PROCEDURE — 3051F HG A1C>EQUAL 7.0%<8.0%: CPT | Mod: ,,, | Performed by: NURSE PRACTITIONER

## 2025-06-27 PROCEDURE — 1160F RVW MEDS BY RX/DR IN RCRD: CPT | Mod: ,,, | Performed by: NURSE PRACTITIONER

## 2025-06-27 PROCEDURE — G2211 COMPLEX E/M VISIT ADD ON: HCPCS | Mod: ,,, | Performed by: NURSE PRACTITIONER

## 2025-06-27 PROCEDURE — 1125F AMNT PAIN NOTED PAIN PRSNT: CPT | Mod: ,,, | Performed by: NURSE PRACTITIONER

## 2025-06-27 PROCEDURE — 1159F MED LIST DOCD IN RCRD: CPT | Mod: ,,, | Performed by: NURSE PRACTITIONER

## 2025-06-27 PROCEDURE — 3074F SYST BP LT 130 MM HG: CPT | Mod: ,,, | Performed by: NURSE PRACTITIONER

## 2025-06-27 RX ORDER — TIZANIDINE 4 MG/1
4 TABLET ORAL EVERY 6 HOURS PRN
Qty: 30 TABLET | Refills: 0 | Status: SHIPPED | OUTPATIENT
Start: 2025-06-27 | End: 2025-07-07

## 2025-06-27 RX ORDER — MELOXICAM 15 MG/1
15 TABLET ORAL DAILY
Qty: 30 TABLET | Refills: 0 | Status: SHIPPED | OUTPATIENT
Start: 2025-06-27 | End: 2025-07-27

## 2025-07-01 ENCOUNTER — HOSPITAL ENCOUNTER (OUTPATIENT)
Dept: RADIOLOGY | Facility: HOSPITAL | Age: 70
Discharge: HOME | End: 2025-07-01
Attending: NURSE PRACTITIONER
Payer: MEDICARE

## 2025-07-01 DIAGNOSIS — R92.8 ABNORMAL MAMMOGRAM: ICD-10-CM

## 2025-07-01 PROCEDURE — 77065 DX MAMMO INCL CAD UNI: CPT | Mod: TC,LT

## 2025-07-01 PROCEDURE — 77061 BREAST TOMOSYNTHESIS UNI: CPT | Mod: 26,LT,, | Performed by: RADIOLOGY

## 2025-07-01 PROCEDURE — 77065 DX MAMMO INCL CAD UNI: CPT | Mod: 26,LT,, | Performed by: RADIOLOGY

## 2025-07-01 PROCEDURE — 76642 ULTRASOUND BREAST LIMITED: CPT | Mod: TC,LT

## 2025-07-01 PROCEDURE — 76642 ULTRASOUND BREAST LIMITED: CPT | Mod: 26,LT,, | Performed by: RADIOLOGY

## 2025-07-02 ENCOUNTER — OFFICE VISIT (OUTPATIENT)
Dept: ORTHOPEDICS | Facility: CLINIC | Age: 70
End: 2025-07-02
Payer: MEDICARE

## 2025-07-02 DIAGNOSIS — G89.29 CHRONIC RIGHT SHOULDER PAIN: ICD-10-CM

## 2025-07-02 DIAGNOSIS — S46.001A INJURY OF TENDON OF RIGHT ROTATOR CUFF, INITIAL ENCOUNTER: ICD-10-CM

## 2025-07-02 DIAGNOSIS — M25.811 IMPINGEMENT OF RIGHT SHOULDER: ICD-10-CM

## 2025-07-02 DIAGNOSIS — M25.519 SHOULDER PAIN, UNSPECIFIED CHRONICITY, UNSPECIFIED LATERALITY: Primary | ICD-10-CM

## 2025-07-02 DIAGNOSIS — M25.511 CHRONIC RIGHT SHOULDER PAIN: ICD-10-CM

## 2025-07-02 PROCEDURE — 1160F RVW MEDS BY RX/DR IN RCRD: CPT | Mod: CPTII,,, | Performed by: ORTHOPAEDIC SURGERY

## 2025-07-02 PROCEDURE — 3051F HG A1C>EQUAL 7.0%<8.0%: CPT | Mod: CPTII,,, | Performed by: ORTHOPAEDIC SURGERY

## 2025-07-02 PROCEDURE — 3061F NEG MICROALBUMINURIA REV: CPT | Mod: CPTII,,, | Performed by: ORTHOPAEDIC SURGERY

## 2025-07-02 PROCEDURE — 3288F FALL RISK ASSESSMENT DOCD: CPT | Mod: CPTII,,, | Performed by: ORTHOPAEDIC SURGERY

## 2025-07-02 PROCEDURE — 3066F NEPHROPATHY DOC TX: CPT | Mod: CPTII,,, | Performed by: ORTHOPAEDIC SURGERY

## 2025-07-02 PROCEDURE — 1159F MED LIST DOCD IN RCRD: CPT | Mod: CPTII,,, | Performed by: ORTHOPAEDIC SURGERY

## 2025-07-02 PROCEDURE — 1101F PT FALLS ASSESS-DOCD LE1/YR: CPT | Mod: CPTII,,, | Performed by: ORTHOPAEDIC SURGERY

## 2025-07-02 PROCEDURE — 99214 OFFICE O/P EST MOD 30 MIN: CPT | Mod: 25,,, | Performed by: ORTHOPAEDIC SURGERY

## 2025-07-02 PROCEDURE — 20610 DRAIN/INJ JOINT/BURSA W/O US: CPT | Mod: RT,,, | Performed by: ORTHOPAEDIC SURGERY

## 2025-07-02 PROCEDURE — 4010F ACE/ARB THERAPY RXD/TAKEN: CPT | Mod: CPTII,,, | Performed by: ORTHOPAEDIC SURGERY

## 2025-07-02 RX ADMIN — METHYLPREDNISOLONE ACETATE 80 MG: 80 INJECTION, SUSPENSION INTRA-ARTICULAR; INTRALESIONAL; INTRAMUSCULAR; SOFT TISSUE at 09:07

## 2025-07-02 RX ADMIN — LIDOCAINE HYDROCHLORIDE 2 MG: 20 INJECTION, SOLUTION INFILTRATION; PERINEURAL at 09:07

## 2025-07-02 NOTE — PROGRESS NOTES
Subjective:    CC: Pain of the Right Shoulder (R shoulder pain. Started hurting 2 weeks ago when she was stopping something overhead from falling. Shoulder has been hurting off and on. No numbness or tingling. Has some swelling in bicep after using arm a lot. Has difficulty carrying and lifting things. Requesting a cortisone inj, last inj was about 3 years ago. )       HPI:  Complaining of right shoulder pain for the last 2 weeks.  Patient states she was stopping some holding when she injured her right shoulder.  He denies any numbness or tingling, has been having pain especially with overhead activities.  She has had history of issues with her shoulder before.    ROS: Refer to HPI for pertinent ROS. All other 12 point systems negative.    Objective:  There were no vitals filed for this visit.     Physical Exam:  Patient is well-nourished and well-developed, in no apparent distress, pleasant and cooperative. Examination of the right upper extremity compartments are soft and warm.  Skin is intact. There are no signs or symptoms of DVT or infection.   Patient is tender to palpation along the  lateral aspect .  Patient is able to forward flex and abduct to 110.  Positive Jane and Neers, positive empty can, questionable drop arm test. Negative sulcus sign. Stable to stressing. Neurovascularly intact distally.    Images:  X-rays three views right shoulder demonstrate no obvious fracture or dislocation. Images Reviewed and discussed with patient.    Assessment:  1. Shoulder pain, unspecified chronicity, unspecified laterality  - X-ray Shoulder 2 or More Views Right; Future    2. Chronic right shoulder pain  - Ambulatory referral/consult to Orthopedics    3. Impingement of right shoulder  - Large Joint Aspiration/Injection: R subacromial bursa    4. Injury of tendon of right rotator cuff, initial encounter  - Large Joint Aspiration/Injection: R subacromial bursa        Plan:  At this time we discussed her physical exam  and x-ray findings.  We have discussed various treatment options.  She would like to hold off on an MRI at this time.  Under sterile technique she tolerated a subacromial injection very well to the right shoulder.  She was given a pamphlet on shoulder range of motion strengthening exercises.  I would like see back in 6 weeks to see how she is progressing.    Follow UP: No follow-ups on file.    Large Joint Aspiration/Injection: R subacromial bursa    Date/Time: 7/2/2025 9:15 AM    Performed by: Dev Weaver MD  Authorized by: Dev Weaver MD    Consent Done?:  Yes (Verbal)  Indications:  Pain  Site marked: the procedure site was marked    Timeout: prior to procedure the correct patient, procedure, and site was verified    Prep: patient was prepped and draped in usual sterile fashion    Approach:  Posterior  Location:  Shoulder  Site:  R subacromial bursa  Medications:  2 mg LIDOcaine HCL 20 mg/ml (2%) 20 mg/mL (2 %); 80 mg methylPREDNISolone acetate 80 mg/mL  Patient tolerance:  Patient tolerated the procedure well with no immediate complications

## 2025-07-02 NOTE — PROCEDURES
Large Joint Aspiration/Injection: R subacromial bursa    Date/Time: 7/2/2025 9:15 AM    Performed by: Dev Weaver MD  Authorized by: Dev Weaver MD    Consent Done?:  Yes (Verbal)  Indications:  Pain  Site marked: the procedure site was marked    Timeout: prior to procedure the correct patient, procedure, and site was verified    Prep: patient was prepped and draped in usual sterile fashion    Approach:  Posterior  Location:  Shoulder  Site:  R subacromial bursa  Medications:  2 mg LIDOcaine HCL 20 mg/ml (2%) 20 mg/mL (2 %); 80 mg methylPREDNISolone acetate 80 mg/mL  Patient tolerance:  Patient tolerated the procedure well with no immediate complications

## 2025-07-09 RX ORDER — METHYLPREDNISOLONE ACETATE 80 MG/ML
80 INJECTION, SUSPENSION INTRA-ARTICULAR; INTRALESIONAL; INTRAMUSCULAR; SOFT TISSUE
Status: DISCONTINUED | OUTPATIENT
Start: 2025-07-02 | End: 2025-07-09 | Stop reason: HOSPADM

## 2025-07-09 RX ORDER — LIDOCAINE HYDROCHLORIDE 20 MG/ML
2 INJECTION, SOLUTION INFILTRATION; PERINEURAL
Status: DISCONTINUED | OUTPATIENT
Start: 2025-07-02 | End: 2025-07-09 | Stop reason: HOSPADM

## 2025-07-10 DIAGNOSIS — M19.90 ARTHRITIS: ICD-10-CM

## 2025-07-10 RX ORDER — GABAPENTIN 300 MG/1
CAPSULE ORAL
Qty: 120 CAPSULE | Refills: 11 | Status: SHIPPED | OUTPATIENT
Start: 2025-07-10

## 2025-07-14 DIAGNOSIS — E11.9 TYPE 2 DIABETES MELLITUS WITHOUT COMPLICATION, UNSPECIFIED WHETHER LONG TERM INSULIN USE: Primary | ICD-10-CM

## 2025-07-14 RX ORDER — TIRZEPATIDE 5 MG/.5ML
5 INJECTION, SOLUTION SUBCUTANEOUS
Qty: 2 ML | Refills: 3 | Status: SHIPPED | OUTPATIENT
Start: 2025-07-14

## 2025-07-15 ENCOUNTER — TELEPHONE (OUTPATIENT)
Dept: FAMILY MEDICINE | Facility: CLINIC | Age: 70
End: 2025-07-15
Payer: MEDICARE

## 2025-07-15 NOTE — TELEPHONE ENCOUNTER
Pt aware. Understanding voiced.   ----- Message from BRUNO Rehman sent at 7/14/2025  5:07 PM CDT -----  Regarding: RE: REFILL  Mounjaro 5 mg subQ weekly sent to pharmacy.  ----- Message -----  From: Ann Monteiro LPN  Sent: 7/14/2025   2:33 PM CDT  To: BRUNO Darden  Subject: FW: REFILL                                       See below. Please advise.  ----- Message -----  From: Leanne Johnston  Sent: 7/14/2025   8:28 AM CDT  To: Rosario Garcia Staff  Subject: REFILL                                           Pt. Stated That Her PCP Told Her To Call For A Refill On Her mounjaro She Is Out She Also Stated That Her PCP Stated That She Will Will Go Up To The Next dose.Pt. ask for a Call Back From Nurse

## 2025-07-17 NOTE — Clinical Note
The catheter was inserted into the, was removed from the and was inserted over the wire into the ostium   right coronary artery. Hemodynamics were performed.  An angiography was performed of the right coronary arteries. Multiple views were taken. The angiography was performed via power injection. none

## 2025-08-05 DIAGNOSIS — J81.0 ACUTE PULMONARY EDEMA: ICD-10-CM

## 2025-08-05 RX ORDER — FUROSEMIDE 40 MG/1
40 TABLET ORAL 2 TIMES DAILY
Qty: 60 TABLET | Refills: 3 | Status: SHIPPED | OUTPATIENT
Start: 2025-08-05

## 2025-08-11 DIAGNOSIS — E11.9 TYPE 2 DIABETES MELLITUS WITHOUT COMPLICATION, UNSPECIFIED WHETHER LONG TERM INSULIN USE: ICD-10-CM

## 2025-08-12 DIAGNOSIS — E11.9 TYPE 2 DIABETES MELLITUS WITHOUT COMPLICATION, UNSPECIFIED WHETHER LONG TERM INSULIN USE: Primary | ICD-10-CM

## 2025-08-12 RX ORDER — TIRZEPATIDE 7.5 MG/.5ML
7.5 INJECTION, SOLUTION SUBCUTANEOUS
Qty: 2 ML | Refills: 3 | Status: SHIPPED | OUTPATIENT
Start: 2025-08-12

## 2025-08-12 RX ORDER — TIRZEPATIDE 5 MG/.5ML
5 INJECTION, SOLUTION SUBCUTANEOUS
Qty: 2 ML | Refills: 3 | Status: SHIPPED | OUTPATIENT
Start: 2025-08-12 | End: 2025-08-12

## (undated) DEVICE — COVER TABLE HVY DTY 60X90IN

## (undated) DEVICE — CATH OPTITORQUE RADIAL 5FR

## (undated) DEVICE — GLOVE PROTEXIS HYDROGEL SZ6.5

## (undated) DEVICE — TRIATHLON SINGLE-USE FEMORAL PREP KIT
Type: IMPLANTABLE DEVICE | Site: KNEE | Status: NON-FUNCTIONAL
Brand: INSTRUMENT
Removed: 2023-01-03

## (undated) DEVICE — BLADE SAG DUAL CUT 18X90X1.35

## (undated) DEVICE — DEVICE STRATAFIX SYMMETRIC +

## (undated) DEVICE — Device

## (undated) DEVICE — GLOVE PROTEXIS LTX MICRO 8

## (undated) DEVICE — WRAP DEMAYO LEG STERILE

## (undated) DEVICE — BLADE MAKO STANDARD

## (undated) DEVICE — PAD ABDOMINAL 5X9 STERILE

## (undated) DEVICE — BLADE SURG STAINLESS STEEL #10

## (undated) DEVICE — GAUZE AVANT SPNG 4PLY STRL 4X4

## (undated) DEVICE — KIT MANIFOLD LOW PRESS TUBING

## (undated) DEVICE — CUFF ATS 2 PORT SNGL BLDR 34IN

## (undated) DEVICE — GOWN POLY REINF X-LONG 2XL

## (undated) DEVICE — TAPE SILK 3IN

## (undated) DEVICE — KIT DRAPE RIO ONE PIECE W/POCK

## (undated) DEVICE — SYS WASTE FLD DISPOSAL 1400ML

## (undated) DEVICE — SHEATH INTRODUCER 5FR 10CM

## (undated) DEVICE — SOL NACL IRR 1000ML BTL

## (undated) DEVICE — SOL NACL IRR 3000ML

## (undated) DEVICE — GLOVE PROTEXIS BLUE LATEX 8.5

## (undated) DEVICE — DRAPE STERI U-SHAPED 47X51IN

## (undated) DEVICE — PADDING CAST 6IN DELTA ROLL

## (undated) DEVICE — KIT CHECKPOINT TIBIAL

## (undated) DEVICE — KIT SURGICAL TURNOVER

## (undated) DEVICE — ELECTRODE PATIENT RETURN DISP

## (undated) DEVICE — GAUZE SPONGE 4X4 12PLY

## (undated) DEVICE — KIT HAND CONTROL HIGH PRESSUR

## (undated) DEVICE — KIT SYR REUSABLE

## (undated) DEVICE — CUSHION  WC FOAM 20X20X.75IN

## (undated) DEVICE — CATH IMPULSE FL4 5FR 100CM

## (undated) DEVICE — PAD ABD 8X10 STERILE

## (undated) DEVICE — SYS IRRISEPT 450ML0.05% CHG

## (undated) DEVICE — APPLICATOR CHLORAPREP ORN 26ML

## (undated) DEVICE — TUBING HP AIRLSS ROT ADPT 30IN

## (undated) DEVICE — KIT VIZADISC KNEE TRACKING

## (undated) DEVICE — BLADE SURG STAINLESS STEEL #15

## (undated) DEVICE — DRAPE FULL SHEET 70X100IN

## (undated) DEVICE — GUIDEWIRE INQWIRE SE 3MM JTIP

## (undated) DEVICE — CATH IMPULSE 5F 100CM FR4

## (undated) DEVICE — SET ANGIO ACIST CVI ANGIOTOUCH

## (undated) DEVICE — PAD DEFIB CADENCE ADULT R2

## (undated) DEVICE — CATH IMPULSE 5FR PIGTAIL 125CM

## (undated) DEVICE — DRAPE MEDIUM SHEET 40X70IN

## (undated) DEVICE — SUT ETHIBOND EXCEL 1 CTX 18

## (undated) DEVICE — BANDAGE ACE ELASTIC 6"

## (undated) DEVICE — HANDLE DEVON RIGID OR LIGHT

## (undated) DEVICE — GLOVE 6.5 PROTEXIS PI BLUE

## (undated) DEVICE — SYS CARTRIDGE MIXI PRISM II

## (undated) DEVICE — KIT GLIDESHEATH SLEND 6FR 10CM

## (undated) DEVICE — NDL ECLIPSE SAFETY 18GX1-1/2IN

## (undated) DEVICE — SUT VICRYL PLUS 2-0 CT1 18

## (undated) DEVICE — CANNULA ADULT NASAL 7FT

## (undated) DEVICE — DRESSING XEROFORM FOIL PK 1X8